# Patient Record
Sex: MALE | Race: WHITE | NOT HISPANIC OR LATINO | Employment: OTHER | URBAN - METROPOLITAN AREA
[De-identification: names, ages, dates, MRNs, and addresses within clinical notes are randomized per-mention and may not be internally consistent; named-entity substitution may affect disease eponyms.]

---

## 2017-05-05 ENCOUNTER — OFFICE VISIT (OUTPATIENT)
Dept: FAMILY MEDICINE CLINIC | Facility: CLINIC | Age: 57
End: 2017-05-05

## 2017-05-05 VITALS
RESPIRATION RATE: 24 BRPM | HEIGHT: 69 IN | TEMPERATURE: 97.8 F | HEART RATE: 80 BPM | WEIGHT: 155 LBS | BODY MASS INDEX: 22.96 KG/M2 | DIASTOLIC BLOOD PRESSURE: 96 MMHG | SYSTOLIC BLOOD PRESSURE: 150 MMHG

## 2017-05-05 DIAGNOSIS — G47.09 OTHER INSOMNIA: Primary | ICD-10-CM

## 2017-05-05 DIAGNOSIS — D69.6 THROMBOCYTOPENIA (HCC): ICD-10-CM

## 2017-05-05 PROCEDURE — 99213 OFFICE O/P EST LOW 20 MIN: CPT | Performed by: FAMILY MEDICINE

## 2017-05-05 RX ORDER — PREDNISONE 1 MG/1
5 TABLET ORAL DAILY
Qty: 1 TABLET | Refills: 0 | COMMUNITY
Start: 2017-05-05 | End: 2018-05-15 | Stop reason: SDUPTHER

## 2017-05-05 RX ORDER — ZOLPIDEM TARTRATE 12.5 MG/1
12.5 TABLET, FILM COATED, EXTENDED RELEASE ORAL NIGHTLY PRN
Qty: 30 TABLET | Refills: 5 | Status: SHIPPED | OUTPATIENT
Start: 2017-05-05 | End: 2017-07-21 | Stop reason: SDUPTHER

## 2017-07-21 DIAGNOSIS — G47.09 OTHER INSOMNIA: ICD-10-CM

## 2017-07-21 RX ORDER — ZOLPIDEM TARTRATE 12.5 MG/1
TABLET, FILM COATED, EXTENDED RELEASE ORAL
Qty: 30 TABLET | Refills: 0 | Status: SHIPPED | OUTPATIENT
Start: 2017-07-21 | End: 2017-11-15 | Stop reason: SDUPTHER

## 2017-08-14 ENCOUNTER — OFFICE VISIT (OUTPATIENT)
Dept: FAMILY MEDICINE CLINIC | Facility: CLINIC | Age: 57
End: 2017-08-14

## 2017-08-14 VITALS
HEART RATE: 78 BPM | RESPIRATION RATE: 18 BRPM | WEIGHT: 153 LBS | TEMPERATURE: 97.6 F | BODY MASS INDEX: 22.66 KG/M2 | DIASTOLIC BLOOD PRESSURE: 90 MMHG | SYSTOLIC BLOOD PRESSURE: 130 MMHG | HEIGHT: 69 IN

## 2017-08-14 DIAGNOSIS — H81.13 BPV (BENIGN POSITIONAL VERTIGO), BILATERAL: ICD-10-CM

## 2017-08-14 DIAGNOSIS — J06.9 ACUTE URI: Primary | ICD-10-CM

## 2017-08-14 PROCEDURE — 99213 OFFICE O/P EST LOW 20 MIN: CPT | Performed by: FAMILY MEDICINE

## 2017-08-14 RX ORDER — MECLIZINE HYDROCHLORIDE 25 MG/1
TABLET ORAL
Qty: 40 TABLET | Refills: 1 | Status: SHIPPED | OUTPATIENT
Start: 2017-08-14 | End: 2017-08-28 | Stop reason: SDUPTHER

## 2017-08-14 NOTE — PROGRESS NOTES
Subjective   Huang Almonte is a 56 y.o. male.     History of Present Illness     He has had a lot of congestion the last several days  He has had dizziness as well, worse with change in position  Some pressure in his ears  Blowing his nose more      Review of Systems   HENT: Positive for congestion.    Neurological: Positive for dizziness.       Objective   Physical Exam   Constitutional: He appears well-developed and well-nourished.   HENT:   Head: Normocephalic and atraumatic.   Right Ear: Hearing, tympanic membrane, external ear and ear canal normal.   Left Ear: Hearing, tympanic membrane, external ear and ear canal normal.   Nose: Nose normal.   Mouth/Throat: Uvula is midline, oropharynx is clear and moist and mucous membranes are normal.   Eyes: Conjunctivae and EOM are normal. Pupils are equal, round, and reactive to light.   No nystagmus   Neck: Normal range of motion.   Cardiovascular: Normal rate, regular rhythm and normal heart sounds.    Pulmonary/Chest: Effort normal and breath sounds normal.   Lymphadenopathy:     He has no cervical adenopathy.   Psychiatric: He has a normal mood and affect. His behavior is normal.   Nursing note and vitals reviewed.      Assessment/Plan   Huang was seen today for uri.    Diagnoses and all orders for this visit:    Acute URI    BPV (benign positional vertigo), bilateral  -     meclizine (ANTIVERT) 25 MG tablet; 1/2 to 1 to 2 pills as needed every 4 hours for dizziness    norel AD for congestion, call back INB  antivert and epley maneuvers for dizziness, call back INB

## 2017-08-26 DIAGNOSIS — H81.13 BPV (BENIGN POSITIONAL VERTIGO), BILATERAL: ICD-10-CM

## 2017-08-28 DIAGNOSIS — H81.13 BPV (BENIGN POSITIONAL VERTIGO), BILATERAL: ICD-10-CM

## 2017-08-28 RX ORDER — MECLIZINE HYDROCHLORIDE 25 MG/1
TABLET ORAL
Qty: 40 TABLET | Refills: 0 | OUTPATIENT
Start: 2017-08-28

## 2017-08-29 RX ORDER — MECLIZINE HYDROCHLORIDE 25 MG/1
TABLET ORAL
Qty: 40 TABLET | Refills: 0 | Status: SHIPPED | OUTPATIENT
Start: 2017-08-29 | End: 2017-09-05 | Stop reason: SDUPTHER

## 2017-09-05 DIAGNOSIS — H81.13 BPV (BENIGN POSITIONAL VERTIGO), BILATERAL: ICD-10-CM

## 2017-09-05 RX ORDER — MECLIZINE HYDROCHLORIDE 25 MG/1
TABLET ORAL
Qty: 40 TABLET | Refills: 0 | Status: SHIPPED | OUTPATIENT
Start: 2017-09-05 | End: 2018-05-15

## 2017-09-11 DIAGNOSIS — H81.13 BPV (BENIGN POSITIONAL VERTIGO), BILATERAL: ICD-10-CM

## 2017-09-11 RX ORDER — MECLIZINE HYDROCHLORIDE 25 MG/1
TABLET ORAL
Qty: 40 TABLET | Refills: 0 | OUTPATIENT
Start: 2017-09-11

## 2017-10-03 ENCOUNTER — TELEPHONE (OUTPATIENT)
Dept: FAMILY MEDICINE CLINIC | Facility: CLINIC | Age: 57
End: 2017-10-03

## 2017-10-03 NOTE — TELEPHONE ENCOUNTER
----- Message from Becky Benito sent at 10/3/2017  8:54 AM EDT -----  Contact: Maykel Pacheco is needing a refill on Ambien. Please call patient at [phone].

## 2017-11-09 ENCOUNTER — TELEPHONE (OUTPATIENT)
Dept: FAMILY MEDICINE CLINIC | Facility: CLINIC | Age: 57
End: 2017-11-09

## 2017-11-09 NOTE — TELEPHONE ENCOUNTER
----- Message from Dia Rodriguez sent at 11/9/2017  8:27 AM EST -----  Contact: DR LIZARRAGA MED REFILL  MED REFILL ON AMBIEN ER 12.5MG  [phone]

## 2017-11-15 ENCOUNTER — OFFICE VISIT (OUTPATIENT)
Dept: FAMILY MEDICINE CLINIC | Facility: CLINIC | Age: 57
End: 2017-11-15

## 2017-11-15 VITALS
HEIGHT: 69 IN | BODY MASS INDEX: 24.07 KG/M2 | SYSTOLIC BLOOD PRESSURE: 138 MMHG | WEIGHT: 162.5 LBS | TEMPERATURE: 97.5 F | RESPIRATION RATE: 20 BRPM | DIASTOLIC BLOOD PRESSURE: 90 MMHG | HEART RATE: 88 BPM

## 2017-11-15 DIAGNOSIS — Z12.5 SCREENING FOR PROSTATE CANCER: ICD-10-CM

## 2017-11-15 DIAGNOSIS — D69.6 THROMBOCYTOPENIA (HCC): ICD-10-CM

## 2017-11-15 DIAGNOSIS — R35.1 NOCTURIA: ICD-10-CM

## 2017-11-15 DIAGNOSIS — G47.09 OTHER INSOMNIA: Primary | ICD-10-CM

## 2017-11-15 LAB
BASOPHILS # BLD AUTO: 0.05 10*3/MM3 (ref 0–0.2)
BASOPHILS NFR BLD AUTO: 0.6 % (ref 0–1)
EOSINOPHIL # BLD AUTO: 0.14 10*3/MM3 (ref 0–0.3)
EOSINOPHIL NFR BLD AUTO: 1.6 % (ref 0–3)
ERYTHROCYTE [DISTWIDTH] IN BLOOD BY AUTOMATED COUNT: 13.2 % (ref 11.3–14.5)
HCT VFR BLD AUTO: 39.2 % (ref 38.9–50.9)
HGB BLD-MCNC: 13.4 G/DL (ref 13.1–17.5)
IMM GRANULOCYTES # BLD: 0.02 10*3/MM3 (ref 0–0.03)
IMM GRANULOCYTES NFR BLD: 0.2 % (ref 0–0.6)
LYMPHOCYTES # BLD AUTO: 2.71 10*3/MM3 (ref 0.6–4.8)
LYMPHOCYTES NFR BLD AUTO: 31.9 % (ref 24–44)
MCH RBC QN AUTO: 31.4 PG (ref 27–31)
MCHC RBC AUTO-ENTMCNC: 34.2 G/DL (ref 32–36)
MCV RBC AUTO: 91.8 FL (ref 80–99)
MONOCYTES # BLD AUTO: 0.55 10*3/MM3 (ref 0–1)
MONOCYTES NFR BLD AUTO: 6.5 % (ref 0–12)
NEUTROPHILS # BLD AUTO: 5.03 10*3/MM3 (ref 1.5–8.3)
NEUTROPHILS NFR BLD AUTO: 59.2 % (ref 41–71)
PLATELET # BLD AUTO: 57 10*3/MM3 (ref 150–450)
PSA SERPL-MCNC: 2.26 NG/ML (ref 0–4)
RBC # BLD AUTO: 4.27 10*6/MM3 (ref 4.2–5.76)
WBC # BLD AUTO: 8.5 10*3/MM3 (ref 3.5–10.8)

## 2017-11-15 PROCEDURE — 99213 OFFICE O/P EST LOW 20 MIN: CPT | Performed by: FAMILY MEDICINE

## 2017-11-15 RX ORDER — ZOLPIDEM TARTRATE 12.5 MG/1
12.5 TABLET, FILM COATED, EXTENDED RELEASE ORAL NIGHTLY PRN
Qty: 30 TABLET | Refills: 5 | Status: SHIPPED | OUTPATIENT
Start: 2017-11-15 | End: 2018-05-15 | Stop reason: SDUPTHER

## 2017-11-15 NOTE — PROGRESS NOTES
Subjective   Huang Almonte is a 57 y.o. male.     History of Present Illness     He has been taking his ambien without any issues  It does help him sleep and  no SE with this    He has been seeing Dr. Molina for his blood counts, platelet counts  Dr. Reveles has him on prednisone for this  No SE but wondering if he can come here.  I do not have very many notes from hematologist and so will request these for review    The following portions of the patient's history were reviewed and updated as appropriate: allergies, current medications, past family history, past medical history, past social history, past surgical history and problem list.    Review of Systems   Constitutional: Negative.        Objective   Physical Exam   Constitutional: He appears well-developed and well-nourished. No distress.   Cardiovascular: Normal rate, regular rhythm and normal heart sounds.    Pulmonary/Chest: Effort normal and breath sounds normal.   Psychiatric: He has a normal mood and affect. His behavior is normal.   Nursing note and vitals reviewed.      Assessment/Plan   Huang was seen today for insomnia.    Diagnoses and all orders for this visit:    Other insomnia  -     zolpidem CR (AMBIEN CR) 12.5 MG CR tablet; Take 1 tablet by mouth At Night As Needed for Sleep.    Thrombocytopenia  -     CBC & Differential    Screening for prostate cancer  -     PSA    Nocturia  -     PSA    ambien refilled, pt doing well, no change in medicine  Will recheck platelets and request hematology notes.  Also check PSA

## 2018-05-15 ENCOUNTER — OFFICE VISIT (OUTPATIENT)
Dept: FAMILY MEDICINE CLINIC | Facility: CLINIC | Age: 58
End: 2018-05-15

## 2018-05-15 VITALS
HEART RATE: 78 BPM | BODY MASS INDEX: 23.7 KG/M2 | RESPIRATION RATE: 18 BRPM | HEIGHT: 69 IN | DIASTOLIC BLOOD PRESSURE: 70 MMHG | WEIGHT: 160 LBS | TEMPERATURE: 98.5 F | SYSTOLIC BLOOD PRESSURE: 128 MMHG

## 2018-05-15 DIAGNOSIS — D69.6 THROMBOCYTOPENIA (HCC): ICD-10-CM

## 2018-05-15 DIAGNOSIS — E78.1 ESSENTIAL HYPERTRIGLYCERIDEMIA: ICD-10-CM

## 2018-05-15 DIAGNOSIS — G47.09 OTHER INSOMNIA: ICD-10-CM

## 2018-05-15 DIAGNOSIS — F41.1 GENERALIZED ANXIETY DISORDER: Primary | ICD-10-CM

## 2018-05-15 LAB
BASOPHILS # BLD AUTO: 0.1 10*3/MM3 (ref 0–0.2)
BASOPHILS NFR BLD AUTO: 1.2 % (ref 0–1)
CHOLEST SERPL-MCNC: 208 MG/DL (ref 0–200)
EOSINOPHIL # BLD AUTO: 0.16 10*3/MM3 (ref 0–0.3)
EOSINOPHIL NFR BLD AUTO: 1.9 % (ref 0–3)
ERYTHROCYTE [DISTWIDTH] IN BLOOD BY AUTOMATED COUNT: 13.8 % (ref 11.3–14.5)
HCT VFR BLD AUTO: 40.2 % (ref 38.9–50.9)
HDLC SERPL-MCNC: 50 MG/DL (ref 40–60)
HGB BLD-MCNC: 13.4 G/DL (ref 13.1–17.5)
IMM GRANULOCYTES # BLD: 0.02 10*3/MM3 (ref 0–0.03)
IMM GRANULOCYTES NFR BLD: 0.2 % (ref 0–0.6)
LDLC SERPL CALC-MCNC: 94 MG/DL (ref 0–100)
LYMPHOCYTES # BLD AUTO: 2.36 10*3/MM3 (ref 0.6–4.8)
LYMPHOCYTES NFR BLD AUTO: 28.4 % (ref 24–44)
MCH RBC QN AUTO: 30.9 PG (ref 27–31)
MCHC RBC AUTO-ENTMCNC: 33.3 G/DL (ref 32–36)
MCV RBC AUTO: 92.8 FL (ref 80–99)
MONOCYTES # BLD AUTO: 0.48 10*3/MM3 (ref 0–1)
MONOCYTES NFR BLD AUTO: 5.8 % (ref 0–12)
NEUTROPHILS # BLD AUTO: 5.18 10*3/MM3 (ref 1.5–8.3)
NEUTROPHILS NFR BLD AUTO: 62.5 % (ref 41–71)
PLATELET # BLD AUTO: 74 10*3/MM3 (ref 150–450)
RBC # BLD AUTO: 4.33 10*6/MM3 (ref 4.2–5.76)
TRIGL SERPL-MCNC: 322 MG/DL (ref 0–150)
VLDLC SERPL CALC-MCNC: 64.4 MG/DL
WBC # BLD AUTO: 8.3 10*3/MM3 (ref 3.5–10.8)

## 2018-05-15 PROCEDURE — 99214 OFFICE O/P EST MOD 30 MIN: CPT | Performed by: FAMILY MEDICINE

## 2018-05-15 RX ORDER — PREDNISONE 1 MG/1
5 TABLET ORAL DAILY
Qty: 1 TABLET | Refills: 0 | COMMUNITY
Start: 2018-05-15 | End: 2018-05-15 | Stop reason: SDUPTHER

## 2018-05-15 RX ORDER — ZOLPIDEM TARTRATE 12.5 MG/1
12.5 TABLET, FILM COATED, EXTENDED RELEASE ORAL NIGHTLY PRN
Qty: 30 TABLET | Refills: 5 | Status: SHIPPED | OUTPATIENT
Start: 2018-05-15 | End: 2018-10-16 | Stop reason: SDUPTHER

## 2018-05-15 RX ORDER — ESCITALOPRAM OXALATE 10 MG/1
10 TABLET ORAL DAILY
Qty: 30 TABLET | Refills: 2 | Status: SHIPPED | OUTPATIENT
Start: 2018-05-15 | End: 2018-08-15 | Stop reason: SDUPTHER

## 2018-05-15 RX ORDER — ESCITALOPRAM OXALATE 10 MG/1
10 TABLET ORAL DAILY
Qty: 30 TABLET | Refills: 2 | Status: SHIPPED | OUTPATIENT
Start: 2018-05-15 | End: 2018-05-15 | Stop reason: SDUPTHER

## 2018-05-15 RX ORDER — PREDNISONE 1 MG/1
5 TABLET ORAL DAILY
Qty: 30 TABLET | Refills: 5 | Status: SHIPPED | OUTPATIENT
Start: 2018-05-15 | End: 2019-11-08

## 2018-05-15 NOTE — PROGRESS NOTES
Subjective   Huang Almonte is a 57 y.o. male.     History of Present Illness     His sleep is doing ok  He does wake up sometimes form sleep  He is happy with the ambien CR but notes not working as well as it used to when he first started it    He is stressed all the time and this is affecting his sleep  He feels on edge often  He does not recall what he has been on in the past for his mood  Would be willing to try something    He continues to take prednisone for low platelets  He has been getting this from Dr. Duggan at [facility] but would like refill today  Will recheck labs  He is unsure what would happen if he stopped the medicine  No recent bruising nor bleeding problems    The following portions of the patient's history were reviewed and updated as appropriate: allergies, current medications, past family history, past medical history, past social history, past surgical history and problem list.    Review of Systems   Constitutional: Negative.    HENT: Negative.    Eyes: Negative.    Respiratory: Negative.    Cardiovascular: Negative.    Gastrointestinal: Negative.    Musculoskeletal: Negative.    Skin: Negative.    Neurological: Negative.    Hematological: Does not bruise/bleed easily.   Psychiatric/Behavioral: Positive for dysphoric mood. The patient is nervous/anxious.    All other systems reviewed and are negative.      Objective   Physical Exam   Constitutional: He is oriented to person, place, and time. He appears well-developed and well-nourished. No distress.   HENT:   Head: Normocephalic and atraumatic.   Right Ear: External ear normal.   Left Ear: External ear normal.   Nose: Nose normal.   Mouth/Throat: Uvula is midline and oropharynx is clear and moist.   Eyes: Conjunctivae and EOM are normal.   Neck: Normal range of motion. Neck supple. No thyromegaly present.   Cardiovascular: Normal rate, regular rhythm and normal heart sounds.    No murmur heard.  Pulmonary/Chest: Effort normal and breath sounds normal.  No respiratory distress.   Abdominal: Soft. Bowel sounds are normal. He exhibits no distension and no mass. There is no tenderness.   Lymphadenopathy:     He has no cervical adenopathy.   Neurological: He is alert and oriented to person, place, and time.   Skin: Skin is warm and dry.   Psychiatric: He has a normal mood and affect. His behavior is normal. Judgment and thought content normal.   Nursing note and vitals reviewed.      Assessment/Plan   Huang was seen today for follow-up.    Diagnoses and all orders for this visit:    Generalized anxiety disorder    Other insomnia  -     zolpidem CR (AMBIEN CR) 12.5 MG CR tablet; Take 1 tablet by mouth At Night As Needed for Sleep.  -     CBC & Differential    Thrombocytopenia  -     predniSONE (DELTASONE) 5 MG tablet; Take 1 tablet by mouth Daily.  -     CBC & Differential    Essential hypertriglyceridemia  -     Lipid Panel    Other orders  -     escitalopram (LEXAPRO) 10 MG tablet; Take 1 tablet by mouth Daily.    will continue ambien CR for sleep, no change in regimen  Ok pred for low platelets and recheck levels today.  Did recommend he follow up with hematologist as scheduled  Recheck lipids today  lexapro to be started for mood.  Pros/cons/SE discussed with pt but I do think this will boost and improve his mood.  F/u as needed

## 2018-08-15 DIAGNOSIS — F41.1 GENERALIZED ANXIETY DISORDER: ICD-10-CM

## 2018-08-15 RX ORDER — ESCITALOPRAM OXALATE 10 MG/1
TABLET ORAL
Qty: 30 TABLET | Refills: 1 | Status: SHIPPED | OUTPATIENT
Start: 2018-08-15 | End: 2018-10-15 | Stop reason: SDUPTHER

## 2018-10-15 DIAGNOSIS — F41.1 GENERALIZED ANXIETY DISORDER: ICD-10-CM

## 2018-10-15 RX ORDER — ESCITALOPRAM OXALATE 10 MG/1
TABLET ORAL
Qty: 30 TABLET | Refills: 0 | Status: SHIPPED | OUTPATIENT
Start: 2018-10-15 | End: 2018-10-16 | Stop reason: SDUPTHER

## 2018-10-16 ENCOUNTER — OFFICE VISIT (OUTPATIENT)
Dept: FAMILY MEDICINE CLINIC | Facility: CLINIC | Age: 58
End: 2018-10-16

## 2018-10-16 VITALS
SYSTOLIC BLOOD PRESSURE: 124 MMHG | TEMPERATURE: 97.3 F | DIASTOLIC BLOOD PRESSURE: 74 MMHG | WEIGHT: 161 LBS | HEIGHT: 69 IN | HEART RATE: 74 BPM | BODY MASS INDEX: 23.85 KG/M2 | RESPIRATION RATE: 16 BRPM

## 2018-10-16 DIAGNOSIS — D69.6 THROMBOCYTOPENIA (HCC): ICD-10-CM

## 2018-10-16 DIAGNOSIS — S51.812A LACERATION OF LEFT FOREARM, INITIAL ENCOUNTER: ICD-10-CM

## 2018-10-16 DIAGNOSIS — G47.09 OTHER INSOMNIA: ICD-10-CM

## 2018-10-16 DIAGNOSIS — F41.1 GENERALIZED ANXIETY DISORDER: Primary | ICD-10-CM

## 2018-10-16 DIAGNOSIS — R53.82 CHRONIC FATIGUE: ICD-10-CM

## 2018-10-16 DIAGNOSIS — Z12.5 SCREENING FOR PROSTATE CANCER: ICD-10-CM

## 2018-10-16 PROCEDURE — 90472 IMMUNIZATION ADMIN EACH ADD: CPT | Performed by: FAMILY MEDICINE

## 2018-10-16 PROCEDURE — G0008 ADMIN INFLUENZA VIRUS VAC: HCPCS | Performed by: FAMILY MEDICINE

## 2018-10-16 PROCEDURE — 90686 IIV4 VACC NO PRSV 0.5 ML IM: CPT | Performed by: FAMILY MEDICINE

## 2018-10-16 PROCEDURE — 90715 TDAP VACCINE 7 YRS/> IM: CPT | Performed by: FAMILY MEDICINE

## 2018-10-16 PROCEDURE — 99214 OFFICE O/P EST MOD 30 MIN: CPT | Performed by: FAMILY MEDICINE

## 2018-10-16 RX ORDER — ESCITALOPRAM OXALATE 10 MG/1
10 TABLET ORAL DAILY
Qty: 30 TABLET | Refills: 5 | Status: SHIPPED | OUTPATIENT
Start: 2018-10-16 | End: 2019-04-19 | Stop reason: DRUGHIGH

## 2018-10-16 RX ORDER — ZOLPIDEM TARTRATE 12.5 MG/1
12.5 TABLET, FILM COATED, EXTENDED RELEASE ORAL NIGHTLY PRN
Qty: 30 TABLET | Refills: 5 | Status: SHIPPED | OUTPATIENT
Start: 2018-10-16 | End: 2019-04-19 | Stop reason: SDUPTHER

## 2018-10-16 RX ORDER — ASPIRIN 81 MG/1
81 TABLET ORAL DAILY
Qty: 30 TABLET | Refills: 11 | Status: SHIPPED | OUTPATIENT
Start: 2018-10-16 | End: 2023-02-24

## 2018-10-16 NOTE — PROGRESS NOTES
Subjective   Huang Almonte is a 58 y.o. male.     History of Present Illness     Sleep continues to do well with the ambien  He has some issues the next day but not all the time  This is still better then the regular version    Mood has been doing well with the lexapro  It seems to help  He cannot really tell how it is doing at this time but no complaints  He is not interested in increasing the dose at this time    He is on prednisone for his platelets  He has not had labs drawn in quite a while and would like this checked    He continues to feel tired  Just poor energy all the time  Does feel rested in the AM but just fatigues easily    He has a cut on his left arm  He notes he has not had a tetanus in a long time and asks about this    The following portions of the patient's history were reviewed and updated as appropriate: allergies, current medications, past family history, past medical history, past social history, past surgical history and problem list.    Review of Systems   Constitutional: Positive for fatigue.   HENT: Negative.    Eyes: Negative.    Respiratory: Negative.    Cardiovascular: Negative.  Negative for chest pain.   Gastrointestinal: Negative.    Musculoskeletal: Positive for arthralgias and myalgias.   Skin: Negative.    Neurological: Negative.    Psychiatric/Behavioral: Positive for dysphoric mood. The patient is not nervous/anxious.    All other systems reviewed and are negative.      Objective   Physical Exam   Constitutional: He is oriented to person, place, and time. He appears well-developed and well-nourished. No distress.   HENT:   Head: Normocephalic and atraumatic.   Right Ear: Tympanic membrane, external ear and ear canal normal.   Left Ear: Tympanic membrane, external ear and ear canal normal.   Nose: Nose normal.   Mouth/Throat: Uvula is midline and oropharynx is clear and moist.   Eyes: Conjunctivae and EOM are normal.   Neck: Normal range of motion. Neck supple. No thyromegaly  present.   Cardiovascular: Normal rate, regular rhythm and normal heart sounds.    No murmur heard.  Pulmonary/Chest: Effort normal and breath sounds normal. No respiratory distress.   Abdominal: Soft. Bowel sounds are normal. He exhibits no distension and no mass. There is no tenderness.   Lymphadenopathy:     He has no cervical adenopathy.   Neurological: He is alert and oriented to person, place, and time.   Skin: Skin is warm and dry.        Psychiatric: He has a normal mood and affect. His behavior is normal. Judgment and thought content normal.   Nursing note and vitals reviewed.      Assessment/Plan   Huang was seen today for follow-up.    Diagnoses and all orders for this visit:    Generalized anxiety disorder  -     escitalopram (LEXAPRO) 10 MG tablet; Take 1 tablet by mouth Daily.    Other insomnia  -     zolpidem CR (AMBIEN CR) 12.5 MG CR tablet; Take 1 tablet by mouth At Night As Needed for Sleep.    Thrombocytopenia (CMS/HCC)  -     CBC & Differential    Chronic fatigue  -     CBC & Differential  -     Comprehensive Metabolic Panel  -     TSH  -     Testosterone    Screening for prostate cancer  -     PSA Screen    Laceration of left forearm, initial encounter  -     Tdap Vaccine Greater Than or Equal To 8yo IM    Other orders  -     aspirin 81 MG EC tablet; Take 1 tablet by mouth Daily.  -     Flu Vaccine Quad PF 3YR+    his mood has been doing ok, will continue lexapro and consider increase in dose in the future  Continue ambien cr for sleep  Will check labs for fatigue and f/u pending results  Tdap given as he had a recent laceration to his arm that is healing well  Flu shot given today as well

## 2018-10-17 PROBLEM — D69.3 CHRONIC ITP (IDIOPATHIC THROMBOCYTOPENIA): Status: ACTIVE | Noted: 2018-10-17

## 2018-10-17 LAB
ALBUMIN SERPL-MCNC: 4.53 G/DL (ref 3.2–4.8)
ALBUMIN/GLOB SERPL: 2.2 G/DL (ref 1.5–2.5)
ALP SERPL-CCNC: 71 U/L (ref 25–100)
ALT SERPL-CCNC: 17 U/L (ref 7–40)
AST SERPL-CCNC: 21 U/L (ref 0–33)
BASOPHILS # BLD AUTO: 0.09 10*3/MM3 (ref 0–0.2)
BASOPHILS NFR BLD AUTO: 1.6 % (ref 0–1)
BILIRUB SERPL-MCNC: 0.4 MG/DL (ref 0.3–1.2)
BUN SERPL-MCNC: 19 MG/DL (ref 9–23)
BUN/CREAT SERPL: 18.8 (ref 7–25)
CALCIUM SERPL-MCNC: 9.4 MG/DL (ref 8.7–10.4)
CHLORIDE SERPL-SCNC: 104 MMOL/L (ref 99–109)
CO2 SERPL-SCNC: 30 MMOL/L (ref 20–31)
CREAT SERPL-MCNC: 1.01 MG/DL (ref 0.6–1.3)
EOSINOPHIL # BLD AUTO: 0.04 10*3/MM3 (ref 0–0.3)
EOSINOPHIL NFR BLD AUTO: 0.7 % (ref 0–3)
ERYTHROCYTE [DISTWIDTH] IN BLOOD BY AUTOMATED COUNT: 13.5 % (ref 11.3–14.5)
GLOBULIN SER CALC-MCNC: 2.1 GM/DL
GLUCOSE SERPL-MCNC: 117 MG/DL (ref 70–100)
HCT VFR BLD AUTO: 40.6 % (ref 38.9–50.9)
HGB BLD-MCNC: 13.6 G/DL (ref 13.1–17.5)
IMM GRANULOCYTES # BLD: 0.01 10*3/MM3 (ref 0–0.03)
IMM GRANULOCYTES NFR BLD: 0.2 % (ref 0–0.6)
LYMPHOCYTES # BLD AUTO: 1.27 10*3/MM3 (ref 0.6–4.8)
LYMPHOCYTES NFR BLD AUTO: 22.4 % (ref 24–44)
MCH RBC QN AUTO: 30.6 PG (ref 27–31)
MCHC RBC AUTO-ENTMCNC: 33.5 G/DL (ref 32–36)
MCV RBC AUTO: 91.4 FL (ref 80–99)
MONOCYTES # BLD AUTO: 0.24 10*3/MM3 (ref 0–1)
MONOCYTES NFR BLD AUTO: 4.2 % (ref 0–12)
NEUTROPHILS # BLD AUTO: 4.03 10*3/MM3 (ref 1.5–8.3)
NEUTROPHILS NFR BLD AUTO: 71.1 % (ref 41–71)
PLATELET # BLD AUTO: 50 10*3/MM3 (ref 150–450)
POTASSIUM SERPL-SCNC: 4.6 MMOL/L (ref 3.5–5.5)
PROT SERPL-MCNC: 6.6 G/DL (ref 5.7–8.2)
PSA SERPL-MCNC: 2.56 NG/ML (ref 0–4)
RBC # BLD AUTO: 4.44 10*6/MM3 (ref 4.2–5.76)
SODIUM SERPL-SCNC: 136 MMOL/L (ref 132–146)
TESTOST SERPL-MCNC: 255 NG/DL (ref 264–916)
TSH SERPL DL<=0.005 MIU/L-ACNC: 0.77 MIU/ML (ref 0.35–5.35)
WBC # BLD AUTO: 5.67 10*3/MM3 (ref 3.5–10.8)

## 2019-04-19 ENCOUNTER — OFFICE VISIT (OUTPATIENT)
Dept: FAMILY MEDICINE CLINIC | Facility: CLINIC | Age: 59
End: 2019-04-19

## 2019-04-19 VITALS
TEMPERATURE: 98 F | WEIGHT: 160 LBS | HEIGHT: 69 IN | DIASTOLIC BLOOD PRESSURE: 72 MMHG | HEART RATE: 76 BPM | BODY MASS INDEX: 23.7 KG/M2 | SYSTOLIC BLOOD PRESSURE: 130 MMHG | RESPIRATION RATE: 18 BRPM

## 2019-04-19 DIAGNOSIS — D69.6 THROMBOCYTOPENIA (HCC): ICD-10-CM

## 2019-04-19 DIAGNOSIS — G47.30 OBSERVED SLEEP APNEA: ICD-10-CM

## 2019-04-19 DIAGNOSIS — F41.1 GENERALIZED ANXIETY DISORDER: Primary | ICD-10-CM

## 2019-04-19 DIAGNOSIS — Z87.891 PERSONAL HISTORY OF TOBACCO USE, PRESENTING HAZARDS TO HEALTH: ICD-10-CM

## 2019-04-19 DIAGNOSIS — R53.82 CHRONIC FATIGUE: ICD-10-CM

## 2019-04-19 DIAGNOSIS — E29.1 HYPOGONADISM IN MALE: ICD-10-CM

## 2019-04-19 DIAGNOSIS — G47.09 OTHER INSOMNIA: ICD-10-CM

## 2019-04-19 PROCEDURE — 99214 OFFICE O/P EST MOD 30 MIN: CPT | Performed by: FAMILY MEDICINE

## 2019-04-19 RX ORDER — HYDROCODONE BITARTRATE AND ACETAMINOPHEN 10; 325 MG/1; MG/1
TABLET ORAL
COMMUNITY
Start: 2019-04-10 | End: 2021-03-16

## 2019-04-19 RX ORDER — ZOLPIDEM TARTRATE 12.5 MG/1
12.5 TABLET, FILM COATED, EXTENDED RELEASE ORAL NIGHTLY PRN
Qty: 30 TABLET | Refills: 5 | Status: SHIPPED | OUTPATIENT
Start: 2019-04-19 | End: 2019-11-08 | Stop reason: SDUPTHER

## 2019-04-19 RX ORDER — DESVENLAFAXINE SUCCINATE 50 MG/1
50 TABLET, EXTENDED RELEASE ORAL DAILY
Qty: 30 TABLET | Refills: 5 | Status: SHIPPED | OUTPATIENT
Start: 2019-04-19 | End: 2019-11-01 | Stop reason: SDUPTHER

## 2019-04-19 NOTE — PROGRESS NOTES
Subjective   Huang Almonte is a 58 y.o. male.     History of Present Illness     Pt's relative notices that his mood is better overall  They feel he is less anxious but pt not sure    Sleep is doing well with the ambien  No SE that he is aware of but he is still tired the next day and actually is tired all the time    He is still tired, poor energy  Wore out in the AM when he gets out of bed as well  Just always tired  He never had a sleep study  He has been told he stops breathing    His T was low in the past but he never came back for second level  This could be casing his fatigue    He has not been taking his pred every day  He is on this for his platelets  If his platelets are low he will resume this medicine      The following portions of the patient's history were reviewed and updated as appropriate: allergies, current medications, past family history, past medical history, past social history, past surgical history and problem list.    Review of Systems   Constitutional: Positive for fatigue.   HENT: Negative.    Eyes: Negative.    Respiratory: Negative.  Negative for shortness of breath.    Cardiovascular: Negative.  Negative for chest pain.   Gastrointestinal: Negative.  Negative for constipation and diarrhea.   Musculoskeletal: Negative.    Skin: Negative.    Neurological: Negative.    Hematological: Does not bruise/bleed easily.   Psychiatric/Behavioral: Negative.  Negative for sleep disturbance.   All other systems reviewed and are negative.      Objective   Physical Exam   Constitutional: He is oriented to person, place, and time. He appears well-developed and well-nourished. No distress.   Neck: Normal range of motion. Neck supple. No thyromegaly present.   Cardiovascular: Normal rate, regular rhythm and normal heart sounds.   Pulmonary/Chest: Effort normal and breath sounds normal.   Abdominal: Soft. Bowel sounds are normal. He exhibits no distension. There is no tenderness.   Lymphadenopathy:     He has  no cervical adenopathy.   Neurological: He is alert and oriented to person, place, and time.   Psychiatric: He has a normal mood and affect. His behavior is normal. Judgment and thought content normal.   Nursing note and vitals reviewed.      Assessment/Plan   Huang was seen today for anxiety.    Diagnoses and all orders for this visit:    Generalized anxiety disorder  -     desvenlafaxine (PRISTIQ) 50 MG 24 hr tablet; Take 1 tablet by mouth Daily.    Other insomnia  -     zolpidem CR (AMBIEN CR) 12.5 MG CR tablet; Take 1 tablet by mouth At Night As Needed for Sleep.    Thrombocytopenia (CMS/HCC)  -     CBC & Differential  -     Comprehensive Metabolic Panel    Chronic fatigue  -     Testosterone  -     Comprehensive Metabolic Panel  -     Home Sleep Study; Future    Hypogonadism in male  -     Testosterone  -     Comprehensive Metabolic Panel    Observed sleep apnea  -     Home Sleep Study; Future    Personal history of tobacco use, presenting hazards to health  -     CT Chest Low Dose Wo; Future    mood is better on lexapro but we will try pristiq to see if this helps his energy more.  Continue ambien for asleep, this is working well.  Recheck CBC  Fatigue is multifactorial but he has been observed having sleep apnea, will check  Sleep study and recheck his testosterone levels.  Low dose CT for 40 pack year history of smoking

## 2019-04-20 DIAGNOSIS — D69.6 THROMBOCYTOPENIA (HCC): ICD-10-CM

## 2019-04-20 DIAGNOSIS — E29.1 HYPOGONADISM IN MALE: Primary | ICD-10-CM

## 2019-04-20 LAB
ALBUMIN SERPL-MCNC: 5.1 G/DL (ref 3.5–5.2)
ALBUMIN/GLOB SERPL: 2.2 G/DL
ALP SERPL-CCNC: 86 U/L (ref 39–117)
ALT SERPL-CCNC: 19 U/L (ref 1–41)
AST SERPL-CCNC: 22 U/L (ref 1–40)
BASOPHILS # BLD AUTO: (no result) 10*3/UL
BASOPHILS # BLD MANUAL: 0.15 10*3/MM3 (ref 0–0.2)
BASOPHILS NFR BLD MANUAL: 2 % (ref 0–1.5)
BILIRUB SERPL-MCNC: 0.4 MG/DL (ref 0.2–1.2)
BUN SERPL-MCNC: 13 MG/DL (ref 6–20)
BUN/CREAT SERPL: 11.4 (ref 7–25)
CALCIUM SERPL-MCNC: 9.6 MG/DL (ref 8.6–10.5)
CHLORIDE SERPL-SCNC: 100 MMOL/L (ref 98–107)
CO2 SERPL-SCNC: 24.3 MMOL/L (ref 22–29)
CREAT SERPL-MCNC: 1.14 MG/DL (ref 0.76–1.27)
DIFFERENTIAL COMMENT: ABNORMAL
EOSINOPHIL # BLD AUTO: (no result) 10*3/UL
EOSINOPHIL # BLD MANUAL: 0.15 10*3/MM3 (ref 0–0.4)
EOSINOPHIL NFR BLD AUTO: (no result) %
EOSINOPHIL NFR BLD MANUAL: 2 % (ref 0.3–6.2)
ERYTHROCYTE [DISTWIDTH] IN BLOOD BY AUTOMATED COUNT: 13.3 % (ref 12.3–15.4)
GLOBULIN SER CALC-MCNC: 2.3 GM/DL
GLUCOSE SERPL-MCNC: 90 MG/DL (ref 65–99)
HCT VFR BLD AUTO: 40.1 % (ref 37.5–51)
HGB BLD-MCNC: 13.1 G/DL (ref 13–17.7)
LYMPHOCYTES # BLD AUTO: (no result) 10*3/UL
LYMPHOCYTES # BLD MANUAL: 2.54 10*3/MM3 (ref 0.7–3.1)
LYMPHOCYTES NFR BLD AUTO: (no result) %
LYMPHOCYTES NFR BLD MANUAL: 34.7 % (ref 19.6–45.3)
MCH RBC QN AUTO: 29.8 PG (ref 26.6–33)
MCHC RBC AUTO-ENTMCNC: 32.7 G/DL (ref 31.5–35.7)
MCV RBC AUTO: 91.1 FL (ref 79–97)
MONOCYTES # BLD MANUAL: 0.22 10*3/MM3 (ref 0.1–0.9)
MONOCYTES NFR BLD AUTO: (no result) %
MONOCYTES NFR BLD MANUAL: 3 % (ref 5–12)
NEUTROPHILS # BLD MANUAL: 4.21 10*3/MM3 (ref 1.7–7)
NEUTROPHILS NFR BLD AUTO: (no result) %
NEUTROPHILS NFR BLD MANUAL: 57.4 % (ref 42.7–76)
PLATELET # BLD AUTO: 35 10*3/MM3 (ref 140–450)
PLATELET BLD QL SMEAR: ABNORMAL
POTASSIUM SERPL-SCNC: 4 MMOL/L (ref 3.5–5.2)
PROT SERPL-MCNC: 7.4 G/DL (ref 6–8.5)
RBC # BLD AUTO: 4.4 10*6/MM3 (ref 4.14–5.8)
RBC MORPH BLD: ABNORMAL
SODIUM SERPL-SCNC: 140 MMOL/L (ref 136–145)
TESTOST SERPL-MCNC: 188 NG/DL (ref 264–916)
WBC # BLD AUTO: 7.33 10*3/MM3 (ref 3.4–10.8)

## 2019-04-20 RX ORDER — TESTOSTERONE 40.5 MG/2.5G
GEL TOPICAL
Qty: 60 G | Refills: 5 | Status: SHIPPED | OUTPATIENT
Start: 2019-04-20 | End: 2019-11-08

## 2019-04-24 ENCOUNTER — OFFICE VISIT (OUTPATIENT)
Dept: FAMILY MEDICINE CLINIC | Facility: CLINIC | Age: 59
End: 2019-04-24

## 2019-04-24 VITALS
BODY MASS INDEX: 23.62 KG/M2 | TEMPERATURE: 98.3 F | WEIGHT: 159.5 LBS | OXYGEN SATURATION: 98 % | RESPIRATION RATE: 18 BRPM | HEART RATE: 83 BPM | DIASTOLIC BLOOD PRESSURE: 86 MMHG | HEIGHT: 69 IN | SYSTOLIC BLOOD PRESSURE: 142 MMHG

## 2019-04-24 DIAGNOSIS — F41.1 GENERALIZED ANXIETY DISORDER: ICD-10-CM

## 2019-04-24 DIAGNOSIS — Z00.00 ENCOUNTER FOR WELL ADULT EXAM WITHOUT ABNORMAL FINDINGS: ICD-10-CM

## 2019-04-24 DIAGNOSIS — D69.3 CHRONIC ITP (IDIOPATHIC THROMBOCYTOPENIA) (HCC): ICD-10-CM

## 2019-04-24 DIAGNOSIS — E78.1 ESSENTIAL HYPERTRIGLYCERIDEMIA: ICD-10-CM

## 2019-04-24 DIAGNOSIS — Z00.00 MEDICARE ANNUAL WELLNESS VISIT, INITIAL: Primary | ICD-10-CM

## 2019-04-24 DIAGNOSIS — E29.1 HYPOGONADISM IN MALE: ICD-10-CM

## 2019-04-24 PROCEDURE — 99386 PREV VISIT NEW AGE 40-64: CPT | Performed by: PHYSICIAN ASSISTANT

## 2019-04-24 PROCEDURE — G0438 PPPS, INITIAL VISIT: HCPCS | Performed by: PHYSICIAN ASSISTANT

## 2019-04-24 NOTE — PROGRESS NOTES
QUICK REFERENCE INFORMATION:  The ABCs of the Annual Wellness Visit    Initial Medicare Wellness Visit    HEALTH RISK ASSESSMENT    : 1960    Recent Hospitalizations:  No hospitalization(s) within the last year..      Current Medical Providers:  Patient Care Team:  Jeremías Brar MD as PCP - General  Dr. Duggan- hematology (ITP)   Pain management     UTD on eye exam   Upper partial dentures . Not UTD on dental exam       Smoking Status:  Social History     Tobacco Use   Smoking Status Former Smoker   • Packs/day: 1.00   • Years: 40.00   • Pack years: 40.00   • Types: Cigarettes   • Last attempt to quit: 2019   • Years since quittin.2   Smokeless Tobacco Never Used       Alcohol Consumption:  Social History     Substance and Sexual Activity   Alcohol Use Yes    Comment: trying to quit       Depression Screen:   PHQ-2/PHQ-9 Depression Screening 2019   Little interest or pleasure in doing things 1   Feeling down, depressed, or hopeless 1   Trouble falling or staying asleep, or sleeping too much 1   Feeling tired or having little energy 1   Poor appetite or overeating 0   Feeling bad about yourself - or that you are a failure or have let yourself or your family down 0   Trouble concentrating on things, such as reading the newspaper or watching television 0   Moving or speaking so slowly that other people could have noticed. Or the opposite - being so fidgety or restless that you have been moving around a lot more than usual 0   Thoughts that you would be better off dead, or of hurting yourself in some way 0   Total Score 4   If you checked off any problems, how difficult have these problems made it for you to do your work, take care of things at home, or get along with other people? Somewhat difficult       Health Habits and Functional and Cognitive Screening:  Functional & Cognitive Status 2019   Do you have difficulty preparing food and eating? No   Do you have difficulty bathing yourself,  getting dressed or grooming yourself? No   Do you have difficulty using the toilet? No   Do you have difficulty moving around from place to place? No   Do you have trouble with steps or getting out of a bed or a chair? No   In the past year have you fallen or experienced a near fall? No   Current Diet Well Balanced Diet   Dental Exam Not up to date   Eye Exam Not up to date   Exercise (times per week) 3 times per week   Current Exercise Activities Include Walking   Do you need help using the phone?  No   Are you deaf or do you have serious difficulty hearing?  No   Do you need help with transportation? No   Do you need help shopping? No   Do you need help preparing meals?  No   Do you need help with housework?  No   Do you need help with laundry? No   Do you need help taking your medications? No   Do you need help managing money? No   Do you ever drive or ride in a car without wearing a seat belt? No           Does the patient have evidence of cognitive impairment? No    Asiprin use counseling: Taking ASA appropriately as indicated      Recent Lab Results:    Lab Results   Component Value Date    GLU 90 04/19/2019        Lab Results   Component Value Date    TRIG 322 (H) 05/15/2018    HDL 50 05/15/2018    VLDL 64.4 05/15/2018           Age-appropriate Screening Schedule:  Refer to the list below for future screening recommendations based on patient's age, sex and/or medical conditions. Orders for these recommended tests are listed in the plan section. The patient has been provided with a written plan.    Health Maintenance   Topic Date Due   • ZOSTER VACCINE (1 of 2) 04/24/2020 (Originally 10/14/2009)   • LIPID PANEL  05/15/2019   • INFLUENZA VACCINE  08/01/2019   • COLONOSCOPY  10/11/2020   • TDAP/TD VACCINES (2 - Td) 10/16/2028        Subjective   History of Present Illness    Huang Almonte is a 58 y.o. male who presents for an Annual Wellness Visit. Hx of ITP (sees hematology), insomnia, chronic back pain, low  testosterone, neuropathy. Managed by PCP, hematology and pain management. Labs and visits are UTD. No new concerns     The following portions of the patient's history were reviewed and updated as appropriate: allergies, current medications, past family history, past medical history, past social history, past surgical history and problem list.    Outpatient Medications Prior to Visit   Medication Sig Dispense Refill   • aspirin 81 MG EC tablet Take 1 tablet by mouth Daily. 30 tablet 11   • desvenlafaxine (PRISTIQ) 50 MG 24 hr tablet Take 1 tablet by mouth Daily. 30 tablet 5   • gabapentin (NEURONTIN) 400 MG capsule Gabapentin 400 MG Oral Capsule; Patient Sig: Gabapentin 400 MG Oral Capsule ; 90; 0; 07-May-2015; Active     • HYDROcodone-acetaminophen (NORCO)  MG per tablet      • predniSONE (DELTASONE) 5 MG tablet Take 1 tablet by mouth Daily. 30 tablet 5   • Testosterone 40.5 MG/2.5GM (1.62%) gel 2 pump actuations applied daily as directed 60 g 5   • zolpidem CR (AMBIEN CR) 12.5 MG CR tablet Take 1 tablet by mouth At Night As Needed for Sleep. 30 tablet 5     No facility-administered medications prior to visit.        Patient Active Problem List   Diagnosis   • Back pain   • Generalized anxiety disorder   • Essential hypertriglyceridemia   • Current every day smoker   • Insomnia   • Thrombocytopenia (CMS/HCC)   • Cataract   • Chronic ITP (idiopathic thrombocytopenia) (CMS/HCC)   • Hypogonadism in male       Advance Care Planning:  Patient does not have an advance directive - information provided to the patient today    Identification of Risk Factors:  Risk factors include: chronic pain.    Review of Systems   Constitutional: Negative.  Negative for chills, diaphoresis, fatigue and fever.   HENT: Negative.  Negative for congestion, ear discharge, ear pain, hearing loss, nosebleeds, postnasal drip, sinus pressure, sneezing and sore throat.    Eyes: Negative.    Respiratory: Negative.  Negative for cough, chest  tightness, shortness of breath and wheezing.    Cardiovascular: Negative.  Negative for chest pain, palpitations and leg swelling.   Gastrointestinal: Negative for abdominal distention, abdominal pain, blood in stool, constipation, diarrhea, nausea and vomiting.   Genitourinary: Negative.  Negative for difficulty urinating, dysuria, flank pain, frequency, hematuria and urgency.   Musculoskeletal: Positive for arthralgias and back pain. Negative for gait problem, joint swelling, myalgias, neck pain and neck stiffness.   Skin: Negative.  Negative for color change, pallor, rash and wound.   Neurological: Negative for dizziness, syncope, weakness, light-headedness, numbness and headaches.       Compared to one year ago, the patient feels his physical health is the same.  Compared to one year ago, the patient feels his mental health is the same.    Objective     Physical Exam   Constitutional: He is oriented to person, place, and time. He appears well-developed and well-nourished.   HENT:   Head: Normocephalic and atraumatic.   Right Ear: External ear normal.   Left Ear: External ear normal.   Nose: Nose normal.   Mouth/Throat: Oropharynx is clear and moist. No oropharyngeal exudate.   Eyes: Conjunctivae are normal.   Neck: Normal range of motion. Neck supple. No tracheal deviation present. No thyromegaly present.   Cardiovascular: Normal rate, regular rhythm, normal heart sounds and intact distal pulses. Exam reveals no gallop and no friction rub.   No murmur heard.  Pulmonary/Chest: Effort normal and breath sounds normal. No respiratory distress. He has no wheezes. He has no rales. He exhibits no tenderness.   Lymphadenopathy:     He has no cervical adenopathy.   Neurological: He is alert and oriented to person, place, and time.   Skin: Skin is warm and dry.   Psychiatric: He has a normal mood and affect. His behavior is normal. Judgment and thought content normal.   Nursing note and vitals reviewed.      Vitals:     "04/24/19 1138   BP: 142/86   Pulse: 83   Resp: 18   Temp: 98.3 °F (36.8 °C)   SpO2: 98%   Weight: 72.3 kg (159 lb 8 oz)   Height: 175.3 cm (69\")       Patient's Body mass index is 23.55 kg/m². BMI is within normal parameters. No follow-up required..      Assessment/Plan   Patient Self-Management and Personalized Health Advice  The patient has been provided with information about: diet and exercise and preventive services including:   · Advance directive, Exercise counseling provided, Fall Risk assessment done, Nutrition counseling provided, Urinary Incontinence assessment done.    Visit Diagnoses:    ICD-10-CM ICD-9-CM   1. Medicare annual wellness visit, initial Z00.00 V70.0   2. Chronic ITP (idiopathic thrombocytopenia) (CMS/[facility]) D69.3 287.31   3. Essential hypertriglyceridemia E78.1 272.1   4. Hypogonadism in male E29.1 257.2   5. Generalized anxiety disorder F41.1 300.02   6. Encounter for well adult exam without abnormal findings Z00.00 V70.0       No orders of the defined types were placed in this encounter.      Outpatient Encounter Medications as of 4/24/2019   Medication Sig Dispense Refill   • aspirin 81 MG EC tablet Take 1 tablet by mouth Daily. 30 tablet 11   • desvenlafaxine (PRISTIQ) 50 MG 24 hr tablet Take 1 tablet by mouth Daily. 30 tablet 5   • gabapentin (NEURONTIN) 400 MG capsule Gabapentin 400 MG Oral Capsule; Patient Sig: Gabapentin 400 MG Oral Capsule ; 90; 0; 07-May-2015; Active     • HYDROcodone-acetaminophen (NORCO)  MG per tablet      • predniSONE (DELTASONE) 5 MG tablet Take 1 tablet by mouth Daily. 30 tablet 5   • Testosterone 40.5 MG/2.5GM (1.62%) gel 2 pump actuations applied daily as directed 60 g 5   • zolpidem CR (AMBIEN CR) 12.5 MG CR tablet Take 1 tablet by mouth At Night As Needed for Sleep. 30 tablet 5     No facility-administered encounter medications on file as of 4/24/2019.        Reviewed use of high risk medication in the elderly: yes  Reviewed for potential of harmful " drug interactions in the elderly: yes    Follow Up:  1 year for medicare wellness     An After Visit Summary and PPPS with all of these plans were given to the patient.

## 2019-06-09 DIAGNOSIS — D69.6 THROMBOCYTOPENIA (HCC): ICD-10-CM

## 2019-06-11 ENCOUNTER — TELEPHONE (OUTPATIENT)
Dept: FAMILY MEDICINE CLINIC | Facility: CLINIC | Age: 59
End: 2019-06-11

## 2019-06-11 NOTE — TELEPHONE ENCOUNTER
----- Message from Missy Gaspar Rep sent at 6/11/2019 10:06 AM EDT -----  Contact: PT; ELHAM  REFILL      predniSONE 5 MG TAKE ONE TABLET BY MOUTH DAILY       NURYS CONROY    PT: [phone]

## 2019-06-12 ENCOUNTER — TELEPHONE (OUTPATIENT)
Dept: FAMILY MEDICINE CLINIC | Facility: CLINIC | Age: 59
End: 2019-06-12

## 2019-06-12 NOTE — TELEPHONE ENCOUNTER
----- Message from Dia Rodriguez sent at 6/12/2019  8:57 AM EDT -----  Contact: DR LIZARRAGA   PATIENT SAYS HE CAN NOT AFFORD TO GO TO THE HEMATOLOGIST THAT IS WHY HE IS ASKING FOR YOU TO REFILL THE PREDNISONE.  PATIENT IS AWARE THAT YOU ARE NOT BACK IN THE OFFICE TILL Monday.

## 2019-06-18 NOTE — TELEPHONE ENCOUNTER
He will need to follow up with hematology to continue this medicine long term.  ITP is simply not something I treat and he will need to see Dr. Duggan at least once a year for refills of this medicine

## 2019-06-19 RX ORDER — PREDNISONE 1 MG/1
TABLET ORAL
Qty: 30 TABLET | Refills: 4 | OUTPATIENT
Start: 2019-06-19

## 2019-10-15 DIAGNOSIS — F41.1 GENERALIZED ANXIETY DISORDER: ICD-10-CM

## 2019-10-15 RX ORDER — ESCITALOPRAM OXALATE 10 MG/1
TABLET ORAL
Qty: 30 TABLET | Refills: 4 | OUTPATIENT
Start: 2019-10-15

## 2019-11-01 DIAGNOSIS — F41.1 GENERALIZED ANXIETY DISORDER: ICD-10-CM

## 2019-11-01 RX ORDER — DESVENLAFAXINE SUCCINATE 50 MG/1
50 TABLET, EXTENDED RELEASE ORAL DAILY
Qty: 30 TABLET | Refills: 0 | Status: SHIPPED | OUTPATIENT
Start: 2019-11-01 | End: 2019-11-08 | Stop reason: SDUPTHER

## 2019-11-08 ENCOUNTER — OFFICE VISIT (OUTPATIENT)
Dept: FAMILY MEDICINE CLINIC | Facility: CLINIC | Age: 59
End: 2019-11-08

## 2019-11-08 VITALS
HEIGHT: 69 IN | SYSTOLIC BLOOD PRESSURE: 130 MMHG | WEIGHT: 162.5 LBS | RESPIRATION RATE: 18 BRPM | HEART RATE: 80 BPM | DIASTOLIC BLOOD PRESSURE: 84 MMHG | BODY MASS INDEX: 24.07 KG/M2 | TEMPERATURE: 98 F

## 2019-11-08 DIAGNOSIS — G47.09 OTHER INSOMNIA: ICD-10-CM

## 2019-11-08 DIAGNOSIS — D69.3 CHRONIC ITP (IDIOPATHIC THROMBOCYTOPENIA) (HCC): Primary | ICD-10-CM

## 2019-11-08 DIAGNOSIS — Z12.5 SCREENING FOR PROSTATE CANCER: ICD-10-CM

## 2019-11-08 DIAGNOSIS — F41.1 GENERALIZED ANXIETY DISORDER: ICD-10-CM

## 2019-11-08 DIAGNOSIS — E78.1 ESSENTIAL HYPERTRIGLYCERIDEMIA: ICD-10-CM

## 2019-11-08 PROCEDURE — G0008 ADMIN INFLUENZA VIRUS VAC: HCPCS | Performed by: FAMILY MEDICINE

## 2019-11-08 PROCEDURE — 90674 CCIIV4 VAC NO PRSV 0.5 ML IM: CPT | Performed by: FAMILY MEDICINE

## 2019-11-08 PROCEDURE — 99214 OFFICE O/P EST MOD 30 MIN: CPT | Performed by: FAMILY MEDICINE

## 2019-11-08 RX ORDER — ZOLPIDEM TARTRATE 12.5 MG/1
12.5 TABLET, FILM COATED, EXTENDED RELEASE ORAL NIGHTLY PRN
Qty: 30 TABLET | Refills: 5 | Status: SHIPPED | OUTPATIENT
Start: 2019-11-08 | End: 2020-05-01 | Stop reason: SDUPTHER

## 2019-11-08 RX ORDER — DESVENLAFAXINE SUCCINATE 50 MG/1
50 TABLET, EXTENDED RELEASE ORAL DAILY
Qty: 30 TABLET | Refills: 5 | Status: SHIPPED | OUTPATIENT
Start: 2019-11-08 | End: 2020-07-14 | Stop reason: SDUPTHER

## 2019-11-08 NOTE — PROGRESS NOTES
Subjective   Huang Almonte is a 59 y.o. male.     History of Present Illness     Mood is doing ok  No SE with the pristiq  Energy has been doing ok but not a major difference    He has not been on his steroids for a while, dr. Duggna had him on this but pt did not keep the follow up with the hematologist  He was on prednisone 5 mg from Dr. Duggan  Pt would like labs drawn  Simply cannot afford the hematologist appointment    Sleep is doing ok with the ambien  No SE nor issues noted with this medicine  Has been happy with the results    He did not feel the testosterone medicine helped him so he stopped it  Doing fine    The following portions of the patient's history were reviewed and updated as appropriate: allergies, current medications, past family history, past medical history, past social history, past surgical history and problem list.    Review of Systems   Constitutional: Negative.    HENT: Negative.    Eyes: Negative.    Respiratory: Negative.  Negative for shortness of breath.    Cardiovascular: Negative.  Negative for chest pain.   Gastrointestinal: Negative.    Musculoskeletal: Negative.    Skin: Negative.    Neurological: Negative.    Psychiatric/Behavioral: Negative.  Negative for dysphoric mood. The patient is not nervous/anxious.    All other systems reviewed and are negative.      Objective   Physical Exam   Constitutional: He appears well-developed and well-nourished. No distress.   Cardiovascular: Normal rate, regular rhythm and normal heart sounds.   Pulmonary/Chest: Effort normal and breath sounds normal.   Psychiatric: He has a normal mood and affect. His behavior is normal.   Nursing note and vitals reviewed.      Assessment/Plan   Huang was seen today for anxiety.    Diagnoses and all orders for this visit:    Chronic ITP (idiopathic thrombocytopenia) (CMS/[facility])  -     CBC & Differential  -     Comprehensive Metabolic Panel    Generalized anxiety disorder  -     desvenlafaxine (PRISTIQ) 50 MG 24 hr  tablet; Take 1 tablet by mouth Daily.    Other insomnia  -     zolpidem CR (AMBIEN CR) 12.5 MG CR tablet; Take 1 tablet by mouth At Night As Needed for Sleep.    Essential hypertriglyceridemia  -     Comprehensive Metabolic Panel  -     Lipid Panel    Screening for prostate cancer  -     PSA Screen    Other orders  -     Flucelvax Quad (Vial) =>4 yrs ([id number])    will recheck CBC and CMP, consider resumption of steroids pending results  pristiq working ok for mood, no change in pristiq  Continue ambien, this is working well for sleep  chek lipids and PSA, follow up pending labs

## 2019-11-09 LAB
ALBUMIN SERPL-MCNC: 4.7 G/DL (ref 3.5–5.2)
ALBUMIN/GLOB SERPL: 2 G/DL
ALP SERPL-CCNC: 86 U/L (ref 39–117)
ALT SERPL-CCNC: 17 U/L (ref 1–41)
AST SERPL-CCNC: 22 U/L (ref 1–40)
BASOPHILS # BLD AUTO: (no result) 10*3/UL
BILIRUB SERPL-MCNC: 0.4 MG/DL (ref 0.2–1.2)
BUN SERPL-MCNC: 12 MG/DL (ref 8–23)
BUN/CREAT SERPL: 12.2 (ref 7–25)
CALCIUM SERPL-MCNC: 9.1 MG/DL (ref 8.6–10.5)
CHLORIDE SERPL-SCNC: 98 MMOL/L (ref 98–107)
CHOLEST SERPL-MCNC: 196 MG/DL (ref 0–200)
CO2 SERPL-SCNC: 28.6 MMOL/L (ref 22–29)
CREAT SERPL-MCNC: 0.98 MG/DL (ref 0.76–1.27)
DIFFERENTIAL COMMENT: ABNORMAL
EOSINOPHIL # BLD AUTO: (no result) 10*3/UL
EOSINOPHIL # BLD MANUAL: 0.13 10*3/MM3 (ref 0–0.4)
EOSINOPHIL NFR BLD AUTO: (no result) %
EOSINOPHIL NFR BLD MANUAL: 2.1 % (ref 0.3–6.2)
ERYTHROCYTE [DISTWIDTH] IN BLOOD BY AUTOMATED COUNT: 13.3 % (ref 12.3–15.4)
GLOBULIN SER CALC-MCNC: 2.4 GM/DL
GLUCOSE SERPL-MCNC: 96 MG/DL (ref 65–99)
HCT VFR BLD AUTO: 37.3 % (ref 37.5–51)
HDLC SERPL-MCNC: 62 MG/DL (ref 40–60)
HGB BLD-MCNC: 12.5 G/DL (ref 13–17.7)
LDLC SERPL CALC-MCNC: 110 MG/DL (ref 0–100)
LYMPHOCYTES # BLD AUTO: (no result) 10*3/UL
LYMPHOCYTES # BLD MANUAL: 1.49 10*3/MM3 (ref 0.7–3.1)
LYMPHOCYTES NFR BLD AUTO: (no result) %
LYMPHOCYTES NFR BLD MANUAL: 24.7 % (ref 19.6–45.3)
MCH RBC QN AUTO: 29.4 PG (ref 26.6–33)
MCHC RBC AUTO-ENTMCNC: 33.5 G/DL (ref 31.5–35.7)
MCV RBC AUTO: 87.8 FL (ref 79–97)
MONOCYTES # BLD MANUAL: 0.06 10*3/MM3 (ref 0.1–0.9)
MONOCYTES NFR BLD AUTO: (no result) %
MONOCYTES NFR BLD MANUAL: 1 % (ref 5–12)
NEUTROPHILS # BLD MANUAL: 4.36 10*3/MM3 (ref 1.7–7)
NEUTROPHILS NFR BLD AUTO: (no result) %
NEUTROPHILS NFR BLD MANUAL: 72.2 % (ref 42.7–76)
PLATELET # BLD AUTO: 69 10*3/MM3 (ref 140–450)
PLATELET BLD QL SMEAR: ABNORMAL
POTASSIUM SERPL-SCNC: 4.3 MMOL/L (ref 3.5–5.2)
PROT SERPL-MCNC: 7.1 G/DL (ref 6–8.5)
PSA SERPL-MCNC: 3.46 NG/ML (ref 0–4)
RBC # BLD AUTO: 4.25 10*6/MM3 (ref 4.14–5.8)
RBC MORPH BLD: ABNORMAL
SODIUM SERPL-SCNC: 140 MMOL/L (ref 136–145)
TRIGL SERPL-MCNC: 120 MG/DL (ref 0–150)
VLDLC SERPL CALC-MCNC: 24 MG/DL
WBC # BLD AUTO: 6.04 10*3/MM3 (ref 3.4–10.8)

## 2020-04-30 ENCOUNTER — TELEPHONE (OUTPATIENT)
Dept: FAMILY MEDICINE CLINIC | Facility: CLINIC | Age: 60
End: 2020-04-30

## 2020-04-30 DIAGNOSIS — G47.09 OTHER INSOMNIA: ICD-10-CM

## 2020-05-01 RX ORDER — ZOLPIDEM TARTRATE 12.5 MG/1
12.5 TABLET, FILM COATED, EXTENDED RELEASE ORAL NIGHTLY PRN
Qty: 30 TABLET | Refills: 0 | Status: SHIPPED | OUTPATIENT
Start: 2020-05-01 | End: 2020-06-12 | Stop reason: SDUPTHER

## 2020-05-11 ENCOUNTER — TELEPHONE (OUTPATIENT)
Dept: FAMILY MEDICINE CLINIC | Facility: CLINIC | Age: 60
End: 2020-05-11

## 2020-05-11 NOTE — TELEPHONE ENCOUNTER
Pt called because he is almost out of his     zolpidem CR (AMBIEN CR) 12.5 MG CR tablet    He would like to know if PA has been sent because he has 2 more left     [facility]: [facility]  PH: [phone]  FAX: [phone]

## 2020-05-12 ENCOUNTER — TELEPHONE (OUTPATIENT)
Dept: FAMILY MEDICINE CLINIC | Facility: CLINIC | Age: 60
End: 2020-05-12

## 2020-05-12 NOTE — TELEPHONE ENCOUNTER
Patient requesting a call back regarding the status of a prior authorization for: zolpidem CR (AMBIEN CR) 12.5 MG CR tablet    The patient stated that he switched insurance from [employer] to [employer] and [facility] stating that they need a response/explanation from Dr. Brar why the patient needs to take the medication. He stated he spoke to someone yesterday (see telephone encounter 5/12/20) and he stated he has not heard an update. He stated he only has 1 pill left.    Please call patient back and advise @[phone].    Preferred Pharmacy [facility] in [place]

## 2020-05-12 NOTE — TELEPHONE ENCOUNTER
PA pending Key: [id number]. Pt aware.     [facility] ID: [id number] BIN#[id number] PCN#[id number] Group#[id number] Policy#

## 2020-05-12 NOTE — TELEPHONE ENCOUNTER
Dulce at [facility] said they do not have any [employer] on file for pt. Pt calling them now w/ [employer] info. I think that's what the prob is w/ the Ambien. Pt will c/b if there are any issues.

## 2020-06-12 DIAGNOSIS — G47.09 OTHER INSOMNIA: ICD-10-CM

## 2020-06-12 RX ORDER — ZOLPIDEM TARTRATE 12.5 MG/1
12.5 TABLET, FILM COATED, EXTENDED RELEASE ORAL NIGHTLY PRN
Qty: 30 TABLET | Refills: 0 | Status: SHIPPED | OUTPATIENT
Start: 2020-06-12 | End: 2020-07-14 | Stop reason: SDUPTHER

## 2020-07-12 DIAGNOSIS — G47.09 OTHER INSOMNIA: ICD-10-CM

## 2020-07-13 DIAGNOSIS — F41.1 GENERALIZED ANXIETY DISORDER: ICD-10-CM

## 2020-07-13 RX ORDER — DESVENLAFAXINE SUCCINATE 50 MG/1
TABLET, EXTENDED RELEASE ORAL
Qty: 30 TABLET | Refills: 4 | OUTPATIENT
Start: 2020-07-13

## 2020-07-14 ENCOUNTER — OFFICE VISIT (OUTPATIENT)
Dept: FAMILY MEDICINE CLINIC | Facility: CLINIC | Age: 60
End: 2020-07-14

## 2020-07-14 VITALS
WEIGHT: 163 LBS | TEMPERATURE: 99.3 F | RESPIRATION RATE: 18 BRPM | DIASTOLIC BLOOD PRESSURE: 98 MMHG | SYSTOLIC BLOOD PRESSURE: 148 MMHG | HEIGHT: 69 IN | BODY MASS INDEX: 24.14 KG/M2 | HEART RATE: 80 BPM

## 2020-07-14 DIAGNOSIS — I10 ESSENTIAL HYPERTENSION: ICD-10-CM

## 2020-07-14 DIAGNOSIS — D69.3 CHRONIC ITP (IDIOPATHIC THROMBOCYTOPENIA) (HCC): ICD-10-CM

## 2020-07-14 DIAGNOSIS — Z00.00 MEDICARE ANNUAL WELLNESS VISIT, SUBSEQUENT: Primary | ICD-10-CM

## 2020-07-14 DIAGNOSIS — F41.1 GENERALIZED ANXIETY DISORDER: ICD-10-CM

## 2020-07-14 DIAGNOSIS — G47.09 OTHER INSOMNIA: ICD-10-CM

## 2020-07-14 DIAGNOSIS — E78.1 ESSENTIAL HYPERTRIGLYCERIDEMIA: ICD-10-CM

## 2020-07-14 PROCEDURE — G0439 PPPS, SUBSEQ VISIT: HCPCS | Performed by: FAMILY MEDICINE

## 2020-07-14 PROCEDURE — 99396 PREV VISIT EST AGE 40-64: CPT | Performed by: FAMILY MEDICINE

## 2020-07-14 RX ORDER — LISINOPRIL AND HYDROCHLOROTHIAZIDE 25; 20 MG/1; MG/1
1 TABLET ORAL DAILY
Qty: 30 TABLET | Refills: 5 | Status: SHIPPED | OUTPATIENT
Start: 2020-07-14 | End: 2020-08-14

## 2020-07-14 RX ORDER — ZOLPIDEM TARTRATE 12.5 MG/1
12.5 TABLET, FILM COATED, EXTENDED RELEASE ORAL NIGHTLY PRN
Qty: 30 TABLET | Refills: 5 | Status: SHIPPED | OUTPATIENT
Start: 2020-07-14 | End: 2020-12-14 | Stop reason: SDUPTHER

## 2020-07-14 RX ORDER — DESVENLAFAXINE SUCCINATE 50 MG/1
50 TABLET, EXTENDED RELEASE ORAL DAILY
Qty: 30 TABLET | Refills: 5 | Status: SHIPPED | OUTPATIENT
Start: 2020-07-14 | End: 2020-12-14 | Stop reason: SDUPTHER

## 2020-07-14 RX ORDER — ZOLPIDEM TARTRATE 12.5 MG/1
TABLET, FILM COATED, EXTENDED RELEASE ORAL
Qty: 30 TABLET | Refills: 0 | OUTPATIENT
Start: 2020-07-14

## 2020-07-14 NOTE — PROGRESS NOTES
The ABCs of the Annual Wellness Visit  Subsequent Medicare Wellness Visit    Chief Complaint   Patient presents with   • Medicare Wellness-subsequent   • Med Refill       Subjective   History of Present Illness:  Huang Almonte is a 59 y.o. male who presents for a Subsequent Medicare Wellness Visit.    HEALTH RISK ASSESSMENT    Recent Hospitalizations:  No hospitalization(s) within the last year.    Current Medical Providers:  Patient Care Team:  Jeremías Brar MD as PCP - General    Smoking Status:  Social History     Tobacco Use   Smoking Status Former Smoker   • Packs/day: 1.00   • Years: 40.00   • Pack years: 40.00   • Types: Cigarettes   • Last attempt to quit: 2019   • Years since quittin.4   Smokeless Tobacco Never Used       Alcohol Consumption:  Social History     Substance and Sexual Activity   Alcohol Use Yes    Comment: trying to quit       Depression Screen:   PHQ-2/PHQ-9 Depression Screening 2020   Little interest or pleasure in doing things 0   Feeling down, depressed, or hopeless 1   Trouble falling or staying asleep, or sleeping too much -   Feeling tired or having little energy -   Poor appetite or overeating -   Feeling bad about yourself - or that you are a failure or have let yourself or your family down -   Trouble concentrating on things, such as reading the newspaper or watching television -   Moving or speaking so slowly that other people could have noticed. Or the opposite - being so fidgety or restless that you have been moving around a lot more than usual -   Thoughts that you would be better off dead, or of hurting yourself in some way -   Total Score 1   If you checked off any problems, how difficult have these problems made it for you to do your work, take care of things at home, or get along with other people? -       Fall Risk Screen:  STEADI Fall Risk Assessment has not been completed.    Health Habits and Functional and Cognitive Screening:  Functional & Cognitive Status  7/14/2020   Do you have difficulty preparing food and eating? No   Do you have difficulty bathing yourself, getting dressed or grooming yourself? No   Do you have difficulty using the toilet? No   Do you have difficulty moving around from place to place? No   Do you have trouble with steps or getting out of a bed or a chair? No   Current Diet Unhealthy Diet   Dental Exam Not up to date   Eye Exam Not up to date   Exercise (times per week) 3 times per week   Current Exercise Activities Include Walking   Do you need help using the phone?  No   Are you deaf or do you have serious difficulty hearing?  No   Do you need help with transportation? No   Do you need help shopping? No   Do you need help preparing meals?  No   Do you need help with housework?  No   Do you need help with laundry? No   Do you need help taking your medications? No   Do you need help managing money? No   Do you ever drive or ride in a car without wearing a seat belt? No   Have you felt unusual stress, anger or loneliness in the last month? No   Who do you live with? Alone   If you need help, do you have trouble finding someone available to you? No   Have you been bothered in the last four weeks by sexual problems? No   Do you have difficulty concentrating, remembering or making decisions? No         Does the patient have evidence of cognitive impairment? No      Age-appropriate Screening Schedule:  Refer to the list below for future screening recommendations based on patient's age, sex and/or medical conditions. Orders for these recommended tests are listed in the plan section. The patient has been provided with a written plan.    Health Maintenance   Topic Date Due   • ZOSTER VACCINE (1 of 2) 10/14/2009   • INFLUENZA VACCINE  08/01/2020   • COLONOSCOPY  10/11/2020   • LIPID PANEL  11/08/2020   • TDAP/TD VACCINES (2 - Td) 10/16/2028          The following portions of the patient's history were reviewed and updated as appropriate: allergies, current  medications, past family history, past medical history, past social history, past surgical history and problem list.    Outpatient Medications Prior to Visit   Medication Sig Dispense Refill   • aspirin 81 MG EC tablet Take 1 tablet by mouth Daily. 30 tablet 11   • gabapentin (NEURONTIN) 400 MG capsule Gabapentin 400 MG Oral Capsule; Patient Sig: Gabapentin 400 MG Oral Capsule ; 90; 0; 07-May-2015; Active     • HYDROcodone-acetaminophen (NORCO)  MG per tablet      • desvenlafaxine (PRISTIQ) 50 MG 24 hr tablet Take 1 tablet by mouth Daily. 30 tablet 5   • zolpidem CR (AMBIEN CR) 12.5 MG CR tablet Take 1 tablet by mouth At Night As Needed for Sleep. 30 tablet 0     No facility-administered medications prior to visit.        Patient Active Problem List   Diagnosis   • Back pain   • Generalized anxiety disorder   • Essential hypertriglyceridemia   • Current every day smoker   • Insomnia   • Cataract   • Chronic ITP (idiopathic thrombocytopenia) (CMS/[facility])   • Hypogonadism in male   • Essential hypertension       Advanced Care Planning:  ACP discussion was held with the patient during this visit. Patient does not have an advance directive, information provided.    Review of Systems   Constitutional: Negative.  Negative for fatigue.   HENT: Negative.    Eyes: Negative.    Respiratory: Negative.  Negative for shortness of breath.    Cardiovascular: Negative.  Negative for chest pain.   Gastrointestinal: Positive for constipation (occasionally). Negative for diarrhea.   Musculoskeletal: Positive for back pain.   Skin: Negative.    Neurological: Negative.    Psychiatric/Behavioral: Negative for dysphoric mood. The patient is not nervous/anxious.    All other systems reviewed and are negative.      Compared to one year ago, the patient feels his physical health is the same.  Compared to one year ago, the patient feels his mental health is the same.    Reviewed chart for potential of high risk medication in the elderly:  "yes  Reviewed chart for potential of harmful drug interactions in the elderly:yes    Objective         Vitals:    07/14/20 1125   BP: 148/98   Pulse: 80   Resp: 18   Temp: 99.3 °F (37.4 °C)   Weight: 73.9 kg (163 lb)   Height: 175.3 cm (69\")       Body mass index is 24.07 kg/m².  Discussed the patient's BMI with him. The BMI is in the acceptable range.    Physical Exam   Constitutional: He is oriented to person, place, and time. He appears well-developed and well-nourished. No distress.   HENT:   Head: Normocephalic and atraumatic.   Right Ear: Hearing, tympanic membrane, external ear and ear canal normal.   Left Ear: Hearing, tympanic membrane, external ear and ear canal normal.   Nose: Nose normal.   Eyes: Conjunctivae and EOM are normal.   Neck: Normal range of motion. Neck supple. No thyromegaly present.   Cardiovascular: Normal rate, regular rhythm and normal heart sounds.   No murmur heard.  Pulmonary/Chest: Effort normal and breath sounds normal. No respiratory distress.   Abdominal: Soft. Bowel sounds are normal. He exhibits no distension and no mass. There is no tenderness.   Musculoskeletal:   Stooped gait   Lymphadenopathy:     He has no cervical adenopathy.   Neurological: He is alert and oriented to person, place, and time.   Skin: Skin is warm and dry.   Psychiatric: He has a normal mood and affect. His behavior is normal. Judgment and thought content normal.   Nursing note and vitals reviewed.            Assessment/Plan   Medicare Risks and Personalized Health Plan  CMS Preventative Services Quick Reference  Advance Directive Discussion  Dementia/Memory   Depression/Dysphoria  Fall Risk  Inactivity/Sedentary    The above risks/problems have been discussed with the patient.  Pertinent information has been shared with the patient in the After Visit Summary.  Follow up plans and orders are seen below in the Assessment/Plan Section.    Diagnoses and all orders for this visit:    1. Medicare annual " wellness visit, subsequent (Primary)    2. Generalized anxiety disorder  -     desvenlafaxine (Pristiq) 50 MG 24 hr tablet; Take 1 tablet by mouth Daily.  Dispense: 30 tablet; Refill: 5    3. Other insomnia  -     zolpidem CR (AMBIEN CR) 12.5 MG CR tablet; Take 1 tablet by mouth At Night As Needed for Sleep.  Dispense: 30 tablet; Refill: 5    4. Essential hypertension  -     CBC & Differential  -     Comprehensive Metabolic Panel  -     lisinopril-hydrochlorothiazide (PRINZIDE,ZESTORETIC) 20-25 MG per tablet; Take 1 tablet by mouth Daily.  Dispense: 30 tablet; Refill: 5    5. Chronic ITP (idiopathic thrombocytopenia) (CMS/HCC)  -     CBC & Differential    6. Essential hypertriglyceridemia  -     CBC & Differential  -     Comprehensive Metabolic Panel  -     Lipid Panel      Counseled about well adult care and encouraged diet and exercise as tolerated.  Mood doing ok, will continue pristiq  Refilled ambien, this continues to help him sleep  Will recheck platelets for history of ITP  BP too high, will start prinzide and recheck in one month    Follow Up:  Return in about 1 month (around 8/14/2020).     An After Visit Summary and PPPS were given to the patient.

## 2020-07-15 LAB
ALBUMIN SERPL-MCNC: 4.8 G/DL (ref 3.5–5.2)
ALBUMIN/GLOB SERPL: 2.4 G/DL
ALP SERPL-CCNC: 87 U/L (ref 39–117)
ALT SERPL-CCNC: 35 U/L (ref 1–41)
AST SERPL-CCNC: 28 U/L (ref 1–40)
BASOPHILS # BLD AUTO: ABNORMAL 10*3/UL
BILIRUB SERPL-MCNC: 0.3 MG/DL (ref 0–1.2)
BUN SERPL-MCNC: 15 MG/DL (ref 8–23)
BUN/CREAT SERPL: 12.5 (ref 7–25)
CALCIUM SERPL-MCNC: 9.3 MG/DL (ref 8.6–10.5)
CHLORIDE SERPL-SCNC: 101 MMOL/L (ref 98–107)
CHOLEST SERPL-MCNC: 204 MG/DL (ref 0–200)
CO2 SERPL-SCNC: 28.9 MMOL/L (ref 22–29)
CREAT SERPL-MCNC: 1.2 MG/DL (ref 0.76–1.27)
DIFFERENTIAL COMMENT: ABNORMAL
EOSINOPHIL # BLD AUTO: ABNORMAL 10*3/UL
EOSINOPHIL NFR BLD AUTO: ABNORMAL %
ERYTHROCYTE [DISTWIDTH] IN BLOOD BY AUTOMATED COUNT: 14.5 % (ref 12.3–15.4)
GLOBULIN SER CALC-MCNC: 2 GM/DL
GLUCOSE SERPL-MCNC: 108 MG/DL (ref 65–99)
HCT VFR BLD AUTO: 38.1 % (ref 37.5–51)
HDLC SERPL-MCNC: 53 MG/DL (ref 40–60)
HGB BLD-MCNC: 12.7 G/DL (ref 13–17.7)
LDLC SERPL CALC-MCNC: 112 MG/DL (ref 0–100)
LYMPHOCYTES # BLD AUTO: ABNORMAL 10*3/UL
LYMPHOCYTES # BLD MANUAL: 1.77 10*3/MM3 (ref 0.7–3.1)
LYMPHOCYTES NFR BLD AUTO: ABNORMAL %
LYMPHOCYTES NFR BLD MANUAL: 28 % (ref 19.6–45.3)
MCH RBC QN AUTO: 29.5 PG (ref 26.6–33)
MCHC RBC AUTO-ENTMCNC: 33.3 G/DL (ref 31.5–35.7)
MCV RBC AUTO: 88.6 FL (ref 79–97)
MONOCYTES # BLD MANUAL: 0.25 10*3/MM3 (ref 0.1–0.9)
MONOCYTES NFR BLD AUTO: ABNORMAL %
MONOCYTES NFR BLD MANUAL: 4 % (ref 5–12)
NEUTROPHILS # BLD MANUAL: 4.18 10*3/MM3 (ref 1.7–7)
NEUTROPHILS NFR BLD AUTO: ABNORMAL %
NEUTROPHILS NFR BLD MANUAL: 66 % (ref 42.7–76)
PLATELET # BLD AUTO: 63 X10E3/UL (ref 150–450)
PLATELET # BLD AUTO: 70 10*3/MM3 (ref 140–450)
PLATELET BLD QL SMEAR: ABNORMAL
POTASSIUM SERPL-SCNC: 5.2 MMOL/L (ref 3.5–5.2)
PROT SERPL-MCNC: 6.8 G/DL (ref 6–8.5)
RBC # BLD AUTO: 4.3 10*6/MM3 (ref 4.14–5.8)
RBC MORPH BLD: ABNORMAL
SODIUM SERPL-SCNC: 140 MMOL/L (ref 136–145)
TRIGL SERPL-MCNC: 197 MG/DL (ref 0–150)
VLDLC SERPL CALC-MCNC: 39.4 MG/DL
WBC # BLD AUTO: 6.33 10*3/MM3 (ref 3.4–10.8)

## 2020-08-10 ENCOUNTER — TELEPHONE (OUTPATIENT)
Dept: FAMILY MEDICINE CLINIC | Facility: CLINIC | Age: 60
End: 2020-08-10

## 2020-08-10 NOTE — TELEPHONE ENCOUNTER
Patient started taking lisinopril-hydrochlorothiazide (PRINZIDE,ZESTORETIC) 20-25 MG per tablet and since he started he has been experiencing  lightheadness and dizziness.  Patient has stopped taking medicine and has not had any symptoms since then.  Patient would like to know if he needs to continue taking it by cutting pills in half or if something else can be called in instead.  Patient would like a call back... Please  Advise    Huang Almonte call back [phone]

## 2020-08-14 ENCOUNTER — TELEPHONE (OUTPATIENT)
Dept: FAMILY MEDICINE CLINIC | Facility: CLINIC | Age: 60
End: 2020-08-14

## 2020-08-14 ENCOUNTER — OFFICE VISIT (OUTPATIENT)
Dept: FAMILY MEDICINE CLINIC | Facility: CLINIC | Age: 60
End: 2020-08-14

## 2020-08-14 VITALS
DIASTOLIC BLOOD PRESSURE: 86 MMHG | TEMPERATURE: 97.8 F | HEART RATE: 98 BPM | HEIGHT: 69 IN | WEIGHT: 161 LBS | BODY MASS INDEX: 23.85 KG/M2 | RESPIRATION RATE: 18 BRPM | SYSTOLIC BLOOD PRESSURE: 137 MMHG

## 2020-08-14 DIAGNOSIS — I10 ESSENTIAL HYPERTENSION: Primary | ICD-10-CM

## 2020-08-14 DIAGNOSIS — D69.3 CHRONIC ITP (IDIOPATHIC THROMBOCYTOPENIA) (HCC): ICD-10-CM

## 2020-08-14 PROCEDURE — 99213 OFFICE O/P EST LOW 20 MIN: CPT | Performed by: FAMILY MEDICINE

## 2020-08-14 RX ORDER — LISINOPRIL AND HYDROCHLOROTHIAZIDE 12.5; 1 MG/1; MG/1
1 TABLET ORAL DAILY
Qty: 30 TABLET | Refills: 5 | Status: SHIPPED | OUTPATIENT
Start: 2020-08-14 | End: 2020-12-14 | Stop reason: SDUPTHER

## 2020-08-14 NOTE — PROGRESS NOTES
Subjective   Huang Almonte is a 59 y.o. male.     History of Present Illness     He was dizzy at home when he was taking a whole pill at home for his BP  We had placed him on prinzide 20/25  The half pill is not causing dizziness      Review of Systems   Constitutional: Negative.    Hematological: Does not bruise/bleed easily.       Objective   Physical Exam   Constitutional: He appears well-developed and well-nourished. No distress.   Cardiovascular: Normal rate, regular rhythm and normal heart sounds.   Pulmonary/Chest: Effort normal and breath sounds normal.   Psychiatric: He has a normal mood and affect. His behavior is normal.   Nursing note and vitals reviewed.      Assessment/Plan   Huang was seen today for hypertension.    Diagnoses and all orders for this visit:    Essential hypertension  -     lisinopril-hydrochlorothiazide (Zestoretic) 10-12.5 MG per tablet; Take 1 tablet by mouth Daily.    Chronic ITP (idiopathic thrombocytopenia) (CMS/HCC)    BP is doing well on the 10/12.5 dose.  Will continue this and his BP is controlled without dizziness

## 2020-08-14 NOTE — TELEPHONE ENCOUNTER
Debbie form [facility] called and stated she has a question regarding the patients lisinopril-hydrochlorothiazide (Zestoretic) 10-12.5 MG per tablet. She states that the medication is on back order and would like to know if the medication can just be split up into the 2 components and administered to the patient this way. Please advise.     Pharmacy call back [phone]

## 2020-08-14 NOTE — TELEPHONE ENCOUNTER
PT CALLED STATED THAT  SENT IN SOMETIME TYPE OF BLOOD PRESSURE MEDICINE BUT PHARMACY DOES NOT HAVE IT IN ONE PILL BUT IN 2 PILL FORM.    PLEASE ADVISE.  CALL BACK:[phone]     PT NOT SURE NAME OF MEDICATION.    [facility] - [place], [place] - [address]

## 2020-10-02 ENCOUNTER — TELEPHONE (OUTPATIENT)
Dept: FAMILY MEDICINE CLINIC | Facility: CLINIC | Age: 60
End: 2020-10-02

## 2020-10-02 NOTE — TELEPHONE ENCOUNTER
Probably will need to get these from pain clinic due to PA required and he is getting treatment from them for his back

## 2020-10-02 NOTE — TELEPHONE ENCOUNTER
Caller: Huang Almonte    Relationship: Self    Best call back number: [phone]     What medication are you requesting: LIDOCAINE PATCHES 5% 10 CM X 14 CM     What are your current symptoms: BACK PAIN.      How long have you been experiencing symptoms: 5 YEARS PLUS    Have you had these symptoms before:    [x] Yes  [] No    Have you been treated for these symptoms before:   [x] Yes  [] No    If a prescription is needed, what is your preferred pharmacy and phone number[place] [facility] [place] [address] [address] [facility] [phone] [facility] [phone]      Additional notes:   THE PATIENT STATES HIS PAIN DR TOLD HIM TO CONTACT HIS PCP TO GET A PRESCRIPTION FOR THE LIDOCAINE PATCHES.    PLEASE CALL PATIENT IF ANY QUESTIONS, PLEASE CALL PATIENT AT   [phone]

## 2020-12-14 ENCOUNTER — OFFICE VISIT (OUTPATIENT)
Dept: FAMILY MEDICINE CLINIC | Facility: CLINIC | Age: 60
End: 2020-12-14

## 2020-12-14 VITALS
HEART RATE: 84 BPM | TEMPERATURE: 97.7 F | WEIGHT: 170 LBS | HEIGHT: 69 IN | RESPIRATION RATE: 18 BRPM | BODY MASS INDEX: 25.18 KG/M2 | DIASTOLIC BLOOD PRESSURE: 82 MMHG | SYSTOLIC BLOOD PRESSURE: 136 MMHG

## 2020-12-14 DIAGNOSIS — Z12.5 SCREENING FOR PROSTATE CANCER: ICD-10-CM

## 2020-12-14 DIAGNOSIS — R49.0 HOARSENESS OF VOICE: ICD-10-CM

## 2020-12-14 DIAGNOSIS — G47.09 OTHER INSOMNIA: ICD-10-CM

## 2020-12-14 DIAGNOSIS — I10 ESSENTIAL HYPERTENSION: Primary | ICD-10-CM

## 2020-12-14 DIAGNOSIS — D69.3 CHRONIC ITP (IDIOPATHIC THROMBOCYTOPENIA) (HCC): ICD-10-CM

## 2020-12-14 DIAGNOSIS — F41.1 GENERALIZED ANXIETY DISORDER: ICD-10-CM

## 2020-12-14 PROCEDURE — 99214 OFFICE O/P EST MOD 30 MIN: CPT | Performed by: FAMILY MEDICINE

## 2020-12-14 RX ORDER — LISINOPRIL AND HYDROCHLOROTHIAZIDE 12.5; 1 MG/1; MG/1
1 TABLET ORAL DAILY
Qty: 30 TABLET | Refills: 5 | Status: SHIPPED | OUTPATIENT
Start: 2020-12-14 | End: 2021-03-16

## 2020-12-14 RX ORDER — ZOLPIDEM TARTRATE 12.5 MG/1
12.5 TABLET, FILM COATED, EXTENDED RELEASE ORAL NIGHTLY PRN
Qty: 30 TABLET | Refills: 5 | Status: SHIPPED | OUTPATIENT
Start: 2020-12-14 | End: 2021-06-22 | Stop reason: SDUPTHER

## 2020-12-14 RX ORDER — DESVENLAFAXINE SUCCINATE 50 MG/1
50 TABLET, EXTENDED RELEASE ORAL DAILY
Qty: 30 TABLET | Refills: 5 | Status: SHIPPED | OUTPATIENT
Start: 2020-12-14 | End: 2021-06-22 | Stop reason: SDUPTHER

## 2020-12-14 NOTE — PROGRESS NOTES
Subjective   Huang Almonte is a 60 y.o. male.     History of Present Illness     Huang Almonte  is here for follow-up of hypertension of several years duration. He is not exercising and is not adherent to a low-salt diet. Patient does not check his blood pressure.   Patient denies chest pain and palpitations. Cardiovascular risk factors: hypertension and male gender. He is compliant with meds.    Mood is doing well with pristiq and sleep is doing well with ambien  No complaints about these medicines  Overall foing well    He has seen Dr. Reveles before for his platelets  No recent bleeding issues notes      For the last 2-3 days he has had some hoarsenss  This is new    The following portions of the patient's history were reviewed and updated as appropriate: allergies, current medications, past family history, past medical history, past social history, past surgical history and problem list.    Review of Systems   Constitutional: Negative.    HENT: Positive for voice change.    Eyes: Negative.    Respiratory: Negative.  Negative for shortness of breath.    Cardiovascular: Negative.  Negative for chest pain.   Gastrointestinal: Negative.    Musculoskeletal: Negative.    Skin: Negative.    Neurological: Negative.    Psychiatric/Behavioral: Negative.  Negative for dysphoric mood. The patient is not nervous/anxious.    All other systems reviewed and are negative.      Objective   Physical Exam  Vitals signs and nursing note reviewed.   Constitutional:       General: He is not in acute distress.     Appearance: Normal appearance. He is well-developed.   Cardiovascular:      Rate and Rhythm: Normal rate and regular rhythm.      Heart sounds: Normal heart sounds.   Pulmonary:      Effort: Pulmonary effort is normal.      Breath sounds: Normal breath sounds.   Neurological:      Mental Status: He is alert and oriented to person, place, and time.   Psychiatric:         Mood and Affect: Mood normal.         Behavior: Behavior  normal.         Thought Content: Thought content normal.         Judgment: Judgment normal.         Assessment/Plan   Diagnoses and all orders for this visit:    1. Essential hypertension (Primary)  -     CBC & Differential  -     Comprehensive Metabolic Panel  -     lisinopril-hydrochlorothiazide (Zestoretic) 10-12.5 MG per tablet; Take 1 tablet by mouth Daily.  Dispense: 30 tablet; Refill: 5  -     Uric Acid    2. Generalized anxiety disorder  -     desvenlafaxine (Pristiq) 50 MG 24 hr tablet; Take 1 tablet by mouth Daily.  Dispense: 30 tablet; Refill: 5    3. Other insomnia  -     zolpidem CR (AMBIEN CR) 12.5 MG CR tablet; Take 1 tablet by mouth At Night As Needed for Sleep.  Dispense: 30 tablet; Refill: 5    4. Chronic ITP (idiopathic thrombocytopenia) (CMS/HCC)  -     CBC & Differential    5. Hoarseness of voice    6. Screening for prostate cancer  -     PSA Screen    BP stable, continue medicine and check labs  pristiq to be continued for his mood  Ok for ambiuen, sleep doing well.  kimber iverson  Will recheck CBC and continu to monitor here.  Back to Dr. Duggan, hematologist, in future with any acute or worsening issues.  Consider ENT for voice change if this does not improve.

## 2020-12-15 ENCOUNTER — TELEPHONE (OUTPATIENT)
Dept: FAMILY MEDICINE CLINIC | Facility: CLINIC | Age: 60
End: 2020-12-15

## 2020-12-15 DIAGNOSIS — R49.0 HOARSENESS OF VOICE: Primary | ICD-10-CM

## 2020-12-15 LAB
ALBUMIN SERPL-MCNC: 4.5 G/DL (ref 3.8–4.8)
ALBUMIN/GLOB SERPL: 1.8 {RATIO} (ref 1.2–2.2)
ALP SERPL-CCNC: 86 IU/L (ref 39–117)
ALT SERPL-CCNC: 14 IU/L (ref 0–44)
AST SERPL-CCNC: 20 IU/L (ref 0–40)
BASOPHILS # BLD AUTO: 0.1 X10E3/UL (ref 0–0.2)
BASOPHILS NFR BLD AUTO: 2 %
BILIRUB SERPL-MCNC: 0.3 MG/DL (ref 0–1.2)
BUN SERPL-MCNC: 17 MG/DL (ref 8–27)
BUN/CREAT SERPL: 12 (ref 10–24)
CALCIUM SERPL-MCNC: 9.4 MG/DL (ref 8.6–10.2)
CHLORIDE SERPL-SCNC: 102 MMOL/L (ref 96–106)
CO2 SERPL-SCNC: 23 MMOL/L (ref 20–29)
CREAT SERPL-MCNC: 1.37 MG/DL (ref 0.76–1.27)
EOSINOPHIL # BLD AUTO: 0.1 X10E3/UL (ref 0–0.4)
EOSINOPHIL NFR BLD AUTO: 2 %
ERYTHROCYTE [DISTWIDTH] IN BLOOD BY AUTOMATED COUNT: 12.9 % (ref 11.6–15.4)
GLOBULIN SER CALC-MCNC: 2.5 G/DL (ref 1.5–4.5)
GLUCOSE SERPL-MCNC: 109 MG/DL (ref 65–99)
HCT VFR BLD AUTO: 32.2 % (ref 37.5–51)
HGB BLD-MCNC: 10.4 G/DL (ref 13–17.7)
IMM GRANULOCYTES # BLD AUTO: 0 X10E3/UL (ref 0–0.1)
IMM GRANULOCYTES NFR BLD AUTO: 0 %
LYMPHOCYTES # BLD AUTO: 2.2 X10E3/UL (ref 0.7–3.1)
LYMPHOCYTES NFR BLD AUTO: 33 %
MCH RBC QN AUTO: 27.3 PG (ref 26.6–33)
MCHC RBC AUTO-ENTMCNC: 32.3 G/DL (ref 31.5–35.7)
MCV RBC AUTO: 85 FL (ref 79–97)
MONOCYTES # BLD AUTO: 0.5 X10E3/UL (ref 0.1–0.9)
MONOCYTES NFR BLD AUTO: 7 %
MORPHOLOGY BLD-IMP: ABNORMAL
NEUTROPHILS # BLD AUTO: 3.8 X10E3/UL (ref 1.4–7)
NEUTROPHILS NFR BLD AUTO: 56 %
PLATELET # BLD AUTO: 67 X10E3/UL (ref 150–450)
POTASSIUM SERPL-SCNC: 4.1 MMOL/L (ref 3.5–5.2)
PROT SERPL-MCNC: 7 G/DL (ref 6–8.5)
PSA SERPL-MCNC: 3.9 NG/ML (ref 0–4)
RBC # BLD AUTO: 3.81 X10E6/UL (ref 4.14–5.8)
SODIUM SERPL-SCNC: 141 MMOL/L (ref 134–144)
URATE SERPL-MCNC: 7.3 MG/DL (ref 3.8–8.4)
WBC # BLD AUTO: 6.6 X10E3/UL (ref 3.4–10.8)

## 2020-12-15 RX ORDER — PREDNISONE 20 MG/1
40 TABLET ORAL DAILY
Qty: 10 TABLET | Refills: 0 | Status: SHIPPED | OUTPATIENT
Start: 2020-12-15 | End: 2021-03-16

## 2020-12-15 NOTE — TELEPHONE ENCOUNTER
Pt called and was expecting something to be called in for his hoarseness. Steroid or antibiotic or both?

## 2021-03-16 ENCOUNTER — OFFICE VISIT (OUTPATIENT)
Dept: FAMILY MEDICINE CLINIC | Facility: CLINIC | Age: 61
End: 2021-03-16

## 2021-03-16 VITALS
WEIGHT: 169.8 LBS | HEART RATE: 92 BPM | TEMPERATURE: 98.6 F | SYSTOLIC BLOOD PRESSURE: 150 MMHG | RESPIRATION RATE: 18 BRPM | DIASTOLIC BLOOD PRESSURE: 92 MMHG | HEIGHT: 69 IN | BODY MASS INDEX: 25.15 KG/M2

## 2021-03-16 DIAGNOSIS — I10 ESSENTIAL HYPERTENSION: ICD-10-CM

## 2021-03-16 DIAGNOSIS — D69.3 CHRONIC ITP (IDIOPATHIC THROMBOCYTOPENIA) (HCC): Primary | ICD-10-CM

## 2021-03-16 PROCEDURE — 99214 OFFICE O/P EST MOD 30 MIN: CPT | Performed by: FAMILY MEDICINE

## 2021-03-16 RX ORDER — OXYCODONE AND ACETAMINOPHEN 7.5; 325 MG/1; MG/1
TABLET ORAL
COMMUNITY
Start: 2021-03-05

## 2021-03-16 RX ORDER — LISINOPRIL AND HYDROCHLOROTHIAZIDE 20; 12.5 MG/1; MG/1
1 TABLET ORAL DAILY
Qty: 30 TABLET | Refills: 2 | Status: SHIPPED | OUTPATIENT
Start: 2021-03-16 | End: 2021-06-10

## 2021-03-16 NOTE — PROGRESS NOTES
Subjective   Huang Almonte is a 60 y.o. male.     History of Present Illness     Due to his low platelets the dentist wants us to see him  He has never had any bleeding issues at all  He has seen Dr. Venkata Duggan at [facility] for this in the past  [facility] would like his levels checked    He is taking his BP medicine but his BP has high today  I rechecked at 142/92  His BP has been high at the dentist and the pain clinic as well  20/25 was too much medicine in the past and he felt dizzy with the high dose    The following portions of the patient's history were reviewed and updated as appropriate: allergies, current medications, past family history, past medical history, past social history, past surgical history and problem list.    Review of Systems   Constitutional: Negative.    Hematological: Does not bruise/bleed easily.   Psychiatric/Behavioral: Negative.        Objective   Physical Exam  Vitals and nursing note reviewed.   Constitutional:       General: He is not in acute distress.     Appearance: Normal appearance. He is well-developed.   Cardiovascular:      Rate and Rhythm: Normal rate and regular rhythm.      Heart sounds: Normal heart sounds.   Pulmonary:      Effort: Pulmonary effort is normal.      Breath sounds: Normal breath sounds.   Neurological:      Mental Status: He is alert and oriented to person, place, and time.   Psychiatric:         Mood and Affect: Mood normal.         Behavior: Behavior normal.         Thought Content: Thought content normal.         Judgment: Judgment normal.         Assessment/Plan   Diagnoses and all orders for this visit:    1. Chronic ITP (idiopathic thrombocytopenia) (CMS/HCC) (Primary)  -     CBC & Differential    2. Essential hypertension  -     lisinopril-hydrochlorothiazide (PRINZIDE,ZESTORETIC) 20-12.5 MG per tablet; Take 1 tablet by mouth Daily.  Dispense: 30 tablet; Refill: 2    pt does not have any history of bleeding problems, his platelets have been steady.  Will  recheck levels and then plan clearing him for surgery and writing letter for [facility]    BP remains a little high, will increase dose pr prinzide form 10/21.5 to 20/12.5 and recheck in future.

## 2021-03-17 LAB
BASOPHILS # BLD AUTO: 0.1 X10E3/UL (ref 0–0.2)
BASOPHILS NFR BLD AUTO: 1 %
EOSINOPHIL # BLD AUTO: 0.1 X10E3/UL (ref 0–0.4)
EOSINOPHIL NFR BLD AUTO: 2 %
ERYTHROCYTE [DISTWIDTH] IN BLOOD BY AUTOMATED COUNT: 16 % (ref 11.6–15.4)
HCT VFR BLD AUTO: 33 % (ref 37.5–51)
HGB BLD-MCNC: 10.5 G/DL (ref 13–17.7)
IMM GRANULOCYTES # BLD AUTO: 0 X10E3/UL (ref 0–0.1)
IMM GRANULOCYTES NFR BLD AUTO: 0 %
LYMPHOCYTES # BLD AUTO: 1.7 X10E3/UL (ref 0.7–3.1)
LYMPHOCYTES NFR BLD AUTO: 27 %
MCH RBC QN AUTO: 27.5 PG (ref 26.6–33)
MCHC RBC AUTO-ENTMCNC: 31.8 G/DL (ref 31.5–35.7)
MCV RBC AUTO: 86 FL (ref 79–97)
MONOCYTES # BLD AUTO: 0.4 X10E3/UL (ref 0.1–0.9)
MONOCYTES NFR BLD AUTO: 7 %
MORPHOLOGY BLD-IMP: ABNORMAL
NEUTROPHILS # BLD AUTO: 3.9 X10E3/UL (ref 1.4–7)
NEUTROPHILS NFR BLD AUTO: 63 %
PLATELET # BLD AUTO: 73 X10E3/UL (ref 150–450)
RBC # BLD AUTO: 3.82 X10E6/UL (ref 4.14–5.8)
WBC # BLD AUTO: 6.2 X10E3/UL (ref 3.4–10.8)

## 2021-04-04 DIAGNOSIS — R49.0 HOARSENESS OF VOICE: ICD-10-CM

## 2021-04-05 RX ORDER — PREDNISONE 20 MG/1
TABLET ORAL
Qty: 10 TABLET | Refills: 0 | OUTPATIENT
Start: 2021-04-05

## 2021-06-10 DIAGNOSIS — I10 ESSENTIAL HYPERTENSION: ICD-10-CM

## 2021-06-10 RX ORDER — LISINOPRIL AND HYDROCHLOROTHIAZIDE 20; 12.5 MG/1; MG/1
TABLET ORAL
Qty: 30 TABLET | Refills: 1 | Status: SHIPPED | OUTPATIENT
Start: 2021-06-10 | End: 2021-06-22 | Stop reason: SDUPTHER

## 2021-06-22 ENCOUNTER — OFFICE VISIT (OUTPATIENT)
Dept: FAMILY MEDICINE CLINIC | Facility: CLINIC | Age: 61
End: 2021-06-22

## 2021-06-22 VITALS
DIASTOLIC BLOOD PRESSURE: 84 MMHG | WEIGHT: 172 LBS | HEIGHT: 69 IN | RESPIRATION RATE: 18 BRPM | BODY MASS INDEX: 25.48 KG/M2 | SYSTOLIC BLOOD PRESSURE: 140 MMHG | TEMPERATURE: 98.4 F | HEART RATE: 88 BPM

## 2021-06-22 DIAGNOSIS — F41.1 GENERALIZED ANXIETY DISORDER: ICD-10-CM

## 2021-06-22 DIAGNOSIS — D69.3 CHRONIC ITP (IDIOPATHIC THROMBOCYTOPENIA) (HCC): ICD-10-CM

## 2021-06-22 DIAGNOSIS — R09.82 POST-NASAL DISCHARGE: ICD-10-CM

## 2021-06-22 DIAGNOSIS — I10 ESSENTIAL HYPERTENSION: Primary | ICD-10-CM

## 2021-06-22 DIAGNOSIS — G47.09 OTHER INSOMNIA: ICD-10-CM

## 2021-06-22 PROCEDURE — 99214 OFFICE O/P EST MOD 30 MIN: CPT | Performed by: FAMILY MEDICINE

## 2021-06-22 RX ORDER — FEXOFENADINE HCL 180 MG/1
180 TABLET ORAL DAILY
Qty: 30 TABLET | Refills: 3 | Status: SHIPPED | OUTPATIENT
Start: 2021-06-22 | End: 2022-07-25

## 2021-06-22 RX ORDER — ATENOLOL 25 MG/1
25 TABLET ORAL DAILY
Qty: 30 TABLET | Refills: 2 | Status: SHIPPED | OUTPATIENT
Start: 2021-06-22 | End: 2021-08-23 | Stop reason: SDUPTHER

## 2021-06-22 RX ORDER — DESVENLAFAXINE SUCCINATE 50 MG/1
50 TABLET, EXTENDED RELEASE ORAL DAILY
Qty: 30 TABLET | Refills: 5 | Status: SHIPPED | OUTPATIENT
Start: 2021-06-22 | End: 2022-01-03

## 2021-06-22 RX ORDER — ZOLPIDEM TARTRATE 12.5 MG/1
12.5 TABLET, FILM COATED, EXTENDED RELEASE ORAL NIGHTLY PRN
Qty: 30 TABLET | Refills: 5 | Status: SHIPPED | OUTPATIENT
Start: 2021-06-22 | End: 2022-01-03

## 2021-06-22 RX ORDER — LISINOPRIL AND HYDROCHLOROTHIAZIDE 20; 12.5 MG/1; MG/1
1 TABLET ORAL DAILY
Qty: 30 TABLET | Refills: 1 | Status: SHIPPED | OUTPATIENT
Start: 2021-06-22 | End: 2021-08-11

## 2021-06-22 NOTE — PROGRESS NOTES
Subjective   Huang Almonte is a 60 y.o. male.     History of Present Illness     Huang Almonte  is here for follow-up of hypertension of several years duration. He is not exercising and is not adherent to a low-salt diet. Patient does check his blood pressure at outside offices and it has been high on several occasions. It is not well controlled at home. Patient denies chest pain and palpitations. Cardiovascular risk factors: hypertension and male gender. He is compliant with meds.      Mood has been doing ok on pristiq  No SE with this medicine  He does want to continue this medicine    He does sleep well with ambien  Has tried OTC without help  He has had some walking episodes at night    He notes a lost of mucous in the back of his throat  He is on zyrtec      The following portions of the patient's history were reviewed and updated as appropriate: allergies, current medications, past family history, past medical history, past social history, past surgical history and problem list.    Review of Systems   Constitutional: Negative.    HENT: Positive for congestion and postnasal drip.    Respiratory: Positive for cough.    Psychiatric/Behavioral: Negative.        Objective   Physical Exam  Vitals and nursing note reviewed.   Constitutional:       Appearance: He is well-developed.   HENT:      Head: Normocephalic and atraumatic.      Right Ear: Hearing, tympanic membrane, ear canal and external ear normal.      Left Ear: Hearing, tympanic membrane, ear canal and external ear normal.      Nose: Nose normal.      Mouth/Throat:      Pharynx: Uvula midline.   Eyes:      Conjunctiva/sclera: Conjunctivae normal.   Cardiovascular:      Rate and Rhythm: Normal rate and regular rhythm.      Heart sounds: Normal heart sounds.   Pulmonary:      Effort: Pulmonary effort is normal.      Breath sounds: Normal breath sounds.   Musculoskeletal:      Cervical back: Normal range of motion.   Lymphadenopathy:      Cervical: No cervical  adenopathy.   Psychiatric:         Behavior: Behavior normal.         Assessment/Plan   Diagnoses and all orders for this visit:    1. Essential hypertension (Primary)  -     lisinopril-hydrochlorothiazide (PRINZIDE,ZESTORETIC) 20-12.5 MG per tablet; Take 1 tablet by mouth Daily.  Dispense: 30 tablet; Refill: 1  -     atenolol (Tenormin) 25 MG tablet; Take 1 tablet by mouth Daily.  Dispense: 30 tablet; Refill: 2  -     CBC & Differential  -     Comprehensive Metabolic Panel  -     Lipid Panel    2. Chronic ITP (idiopathic thrombocytopenia) (CMS/HCC)  -     CBC & Differential    3. Post-nasal discharge  -     fexofenadine (Allegra Allergy) 180 MG tablet; Take 1 tablet by mouth Daily.  Dispense: 30 tablet; Refill: 3    4. Other insomnia  -     zolpidem CR (AMBIEN CR) 12.5 MG CR tablet; Take 1 tablet by mouth At Night As Needed for Sleep.  Dispense: 30 tablet; Refill: 5    5. Generalized anxiety disorder  -     desvenlafaxine (Pristiq) 50 MG 24 hr tablet; Take 1 tablet by mouth Daily.  Dispense: 30 tablet; Refill: 5      BP not well controlled, will add atenolol 25 to prinzide and recheck in 2 months  Will recheck platelets and pt to follow up with hematology  Ok or ambien CR kimber vega reviewed. Sleep doing well  Mood stable, will refill pristiq  Mood ssable, not great.  Will continue prisitq  Ok for refill ambien for sleepsuzie reviewed

## 2021-06-23 LAB
ALBUMIN SERPL-MCNC: 4.6 G/DL (ref 3.5–5.2)
ALBUMIN/GLOB SERPL: 1.8 G/DL
ALP SERPL-CCNC: 79 U/L (ref 39–117)
ALT SERPL-CCNC: 15 U/L (ref 1–41)
AST SERPL-CCNC: 18 U/L (ref 1–40)
BASOPHILS # BLD AUTO: ABNORMAL 10*3/UL
BASOPHILS # BLD MANUAL: 0.07 10*3/MM3 (ref 0–0.2)
BASOPHILS NFR BLD MANUAL: 1 % (ref 0–1.5)
BILIRUB SERPL-MCNC: 0.2 MG/DL (ref 0–1.2)
BUN SERPL-MCNC: 23 MG/DL (ref 8–23)
BUN/CREAT SERPL: 15 (ref 7–25)
CALCIUM SERPL-MCNC: 9.5 MG/DL (ref 8.6–10.5)
CHLORIDE SERPL-SCNC: 103 MMOL/L (ref 98–107)
CHOLEST SERPL-MCNC: 218 MG/DL (ref 0–200)
CO2 SERPL-SCNC: 26.1 MMOL/L (ref 22–29)
CREAT SERPL-MCNC: 1.53 MG/DL (ref 0.76–1.27)
DIFFERENTIAL COMMENT: ABNORMAL
EOSINOPHIL # BLD AUTO: ABNORMAL 10*3/UL
EOSINOPHIL # BLD MANUAL: 0.15 10*3/MM3 (ref 0–0.4)
EOSINOPHIL NFR BLD AUTO: ABNORMAL %
EOSINOPHIL NFR BLD MANUAL: 2.1 % (ref 0.3–6.2)
ERYTHROCYTE [DISTWIDTH] IN BLOOD BY AUTOMATED COUNT: 14.5 % (ref 12.3–15.4)
GLOBULIN SER CALC-MCNC: 2.6 GM/DL
GLUCOSE SERPL-MCNC: 93 MG/DL (ref 65–99)
HCT VFR BLD AUTO: 31.7 % (ref 37.5–51)
HDLC SERPL-MCNC: 40 MG/DL (ref 40–60)
HGB BLD-MCNC: 10.4 G/DL (ref 13–17.7)
LDLC SERPL CALC-MCNC: 118 MG/DL (ref 0–100)
LYMPHOCYTES # BLD AUTO: ABNORMAL 10*3/UL
LYMPHOCYTES # BLD MANUAL: 1.82 10*3/MM3 (ref 0.7–3.1)
LYMPHOCYTES NFR BLD AUTO: ABNORMAL %
LYMPHOCYTES NFR BLD MANUAL: 23.7 % (ref 19.6–45.3)
MCH RBC QN AUTO: 28.2 PG (ref 26.6–33)
MCHC RBC AUTO-ENTMCNC: 32.8 G/DL (ref 31.5–35.7)
MCV RBC AUTO: 85.9 FL (ref 79–97)
MONOCYTES # BLD MANUAL: 0.23 10*3/MM3 (ref 0.1–0.9)
MONOCYTES NFR BLD AUTO: ABNORMAL %
MONOCYTES NFR BLD MANUAL: 3.1 % (ref 5–12)
NEUTROPHILS # BLD MANUAL: 5.08 10*3/MM3 (ref 1.7–7)
NEUTROPHILS NFR BLD AUTO: ABNORMAL %
NEUTROPHILS NFR BLD MANUAL: 69.1 % (ref 42.7–76)
PLATELET # BLD AUTO: 86 10*3/MM3 (ref 140–450)
PLATELET BLD QL SMEAR: ABNORMAL
POTASSIUM SERPL-SCNC: 5.2 MMOL/L (ref 3.5–5.2)
PROT SERPL-MCNC: 7.2 G/DL (ref 6–8.5)
RBC # BLD AUTO: 3.69 10*6/MM3 (ref 4.14–5.8)
RBC MORPH BLD: ABNORMAL
SODIUM SERPL-SCNC: 137 MMOL/L (ref 136–145)
TRIGL SERPL-MCNC: 341 MG/DL (ref 0–150)
VLDLC SERPL CALC-MCNC: 60 MG/DL (ref 5–40)
WBC # BLD AUTO: 7.35 10*3/MM3 (ref 3.4–10.8)

## 2021-08-11 DIAGNOSIS — I10 ESSENTIAL HYPERTENSION: ICD-10-CM

## 2021-08-11 RX ORDER — LISINOPRIL AND HYDROCHLOROTHIAZIDE 20; 12.5 MG/1; MG/1
TABLET ORAL
Qty: 30 TABLET | Refills: 0 | Status: SHIPPED | OUTPATIENT
Start: 2021-08-11 | End: 2021-09-24 | Stop reason: SDUPTHER

## 2021-08-23 ENCOUNTER — OFFICE VISIT (OUTPATIENT)
Dept: FAMILY MEDICINE CLINIC | Facility: CLINIC | Age: 61
End: 2021-08-23

## 2021-08-23 VITALS
DIASTOLIC BLOOD PRESSURE: 84 MMHG | HEIGHT: 69 IN | HEART RATE: 76 BPM | BODY MASS INDEX: 25.62 KG/M2 | TEMPERATURE: 97.8 F | WEIGHT: 173 LBS | SYSTOLIC BLOOD PRESSURE: 138 MMHG | RESPIRATION RATE: 18 BRPM

## 2021-08-23 DIAGNOSIS — I10 ESSENTIAL HYPERTENSION: ICD-10-CM

## 2021-08-23 DIAGNOSIS — Z00.00 MEDICARE ANNUAL WELLNESS VISIT, SUBSEQUENT: Primary | ICD-10-CM

## 2021-08-23 DIAGNOSIS — F41.1 GENERALIZED ANXIETY DISORDER: ICD-10-CM

## 2021-08-23 PROCEDURE — G0439 PPPS, SUBSEQ VISIT: HCPCS | Performed by: FAMILY MEDICINE

## 2021-08-23 RX ORDER — METAPROTERENOL SULFATE 10 MG
500 TABLET ORAL DAILY
COMMUNITY

## 2021-08-23 RX ORDER — ATENOLOL 50 MG/1
50 TABLET ORAL DAILY
Qty: 30 TABLET | Refills: 5 | Status: SHIPPED | OUTPATIENT
Start: 2021-08-23 | End: 2021-09-24 | Stop reason: SDUPTHER

## 2021-08-23 NOTE — PATIENT INSTRUCTIONS
Advance Care Planning and Advance Directives     You make decisions on a daily basis - decisions about where you want to live, your career, your home, your life. Perhaps one of the most important decisions you face is your choice for future medical care. Take time to talk with your family and your healthcare team and start planning today.  Advance Care Planning is a process that can help you:  · Understand possible future healthcare decisions in light of your own experiences  · Reflect on those decision in light of your goals and values  · Discuss your decisions with those closest to you and the healthcare professionals that care for you  · Make a plan by creating a document that reflects your wishes    Surrogate Decision Maker  In the event of a medical emergency, which has left you unable to communicate or to make your own decisions, you would need someone to make decisions for you.  It is important to discuss your preferences for medical treatment with this person while you are in good health.     Qualities of a surrogate decision maker:  • Willing to take on this role and responsibility  • Knows what you want for future medical care  • Willing to follow your wishes even if they don't agree with them  • Able to make difficult medical decisions under stressful circumstances    Advance Directives  These are legal documents you can create that will guide your healthcare team and decision maker(s) when needed. These documents can be stored in the electronic medical record.    · Living Will - a legal document to guide your care if you have a terminal condition or a serious illness and are unable to communicate. States vary by statute in document names/types, but most forms may include one or more of the following:        -  Directions regarding life-prolonging treatments        -  Directions regarding artificially provided nutrition/hydration        -  Choosing a healthcare decision maker        -  Direction  regarding organ/tissue donation    · Durable Power of  for Healthcare - this document names an -in-fact to make medical decisions for you, but it may also allow this person to make personal and financial decisions for you. Please seek the advice of an  if you need this type of document.    **Advance Directives are not required and no one may discriminate against you if you do not sign one.    Medical Orders  Many states allow specific forms/orders signed by your physician to record your wishes for medical treatment in your current state of health. This form, signed in personal communication with your physician, addresses resuscitation and other medical interventions that you may or may not want.      For more information or to schedule a time with a [facility] Advance Care Planning Facilitator contact: [email or web address] or call [phone] and someone will contact you directly.

## 2021-09-17 DIAGNOSIS — I10 ESSENTIAL HYPERTENSION: ICD-10-CM

## 2021-09-20 RX ORDER — ATENOLOL 25 MG/1
TABLET ORAL
Qty: 30 TABLET | Refills: 2 | OUTPATIENT
Start: 2021-09-20

## 2021-09-24 ENCOUNTER — OFFICE VISIT (OUTPATIENT)
Dept: FAMILY MEDICINE CLINIC | Facility: CLINIC | Age: 61
End: 2021-09-24

## 2021-09-24 VITALS
WEIGHT: 173 LBS | BODY MASS INDEX: 25.62 KG/M2 | TEMPERATURE: 97.3 F | RESPIRATION RATE: 18 BRPM | DIASTOLIC BLOOD PRESSURE: 84 MMHG | SYSTOLIC BLOOD PRESSURE: 128 MMHG | HEIGHT: 69 IN | HEART RATE: 74 BPM

## 2021-09-24 DIAGNOSIS — I10 ESSENTIAL HYPERTENSION: Primary | ICD-10-CM

## 2021-09-24 DIAGNOSIS — N18.31 STAGE 3A CHRONIC KIDNEY DISEASE (HCC): ICD-10-CM

## 2021-09-24 DIAGNOSIS — D64.9 ANEMIA, UNSPECIFIED TYPE: ICD-10-CM

## 2021-09-24 LAB
ALBUMIN SERPL-MCNC: 4.5 G/DL (ref 3.5–5.2)
ALBUMIN/GLOB SERPL: 2 G/DL
ALP SERPL-CCNC: 88 U/L (ref 39–117)
ALT SERPL-CCNC: 14 U/L (ref 1–41)
AST SERPL-CCNC: 17 U/L (ref 1–40)
BASOPHILS # BLD AUTO: 0.09 10*3/MM3 (ref 0–0.2)
BASOPHILS NFR BLD AUTO: 1.4 % (ref 0–1.5)
BILIRUB SERPL-MCNC: <0.2 MG/DL (ref 0–1.2)
BUN SERPL-MCNC: 40 MG/DL (ref 8–23)
BUN/CREAT SERPL: 21.7 (ref 7–25)
CALCIUM SERPL-MCNC: 9.5 MG/DL (ref 8.6–10.5)
CHLORIDE SERPL-SCNC: 102 MMOL/L (ref 98–107)
CO2 SERPL-SCNC: 24.6 MMOL/L (ref 22–29)
CREAT SERPL-MCNC: 1.84 MG/DL (ref 0.76–1.27)
EOSINOPHIL # BLD AUTO: 0.25 10*3/MM3 (ref 0–0.4)
EOSINOPHIL NFR BLD AUTO: 3.9 % (ref 0.3–6.2)
ERYTHROCYTE [DISTWIDTH] IN BLOOD BY AUTOMATED COUNT: 14.5 % (ref 12.3–15.4)
FOLATE SERPL-MCNC: 18.2 NG/ML (ref 4.78–24.2)
GLOBULIN SER CALC-MCNC: 2.3 GM/DL
GLUCOSE SERPL-MCNC: 111 MG/DL (ref 65–99)
HCT VFR BLD AUTO: 33.2 % (ref 37.5–51)
HGB BLD-MCNC: 10.6 G/DL (ref 13–17.7)
IMM GRANULOCYTES # BLD AUTO: 0.02 10*3/MM3 (ref 0–0.05)
IMM GRANULOCYTES NFR BLD AUTO: 0.3 % (ref 0–0.5)
IRON SATN MFR SERPL: 8 % (ref 20–50)
IRON SERPL-MCNC: 42 MCG/DL (ref 59–158)
LYMPHOCYTES # BLD AUTO: 2.17 10*3/MM3 (ref 0.7–3.1)
LYMPHOCYTES NFR BLD AUTO: 33.6 % (ref 19.6–45.3)
MCH RBC QN AUTO: 26.7 PG (ref 26.6–33)
MCHC RBC AUTO-ENTMCNC: 31.9 G/DL (ref 31.5–35.7)
MCV RBC AUTO: 83.6 FL (ref 79–97)
MONOCYTES # BLD AUTO: 0.53 10*3/MM3 (ref 0.1–0.9)
MONOCYTES NFR BLD AUTO: 8.2 % (ref 5–12)
NEUTROPHILS # BLD AUTO: 3.39 10*3/MM3 (ref 1.7–7)
NEUTROPHILS NFR BLD AUTO: 52.6 % (ref 42.7–76)
NRBC BLD AUTO-RTO: 0 /100 WBC (ref 0–0.2)
PLATELET # BLD AUTO: 65 10*3/MM3 (ref 140–450)
POTASSIUM SERPL-SCNC: 5.1 MMOL/L (ref 3.5–5.2)
PROT SERPL-MCNC: 6.8 G/DL (ref 6–8.5)
RBC # BLD AUTO: 3.97 10*6/MM3 (ref 4.14–5.8)
SODIUM SERPL-SCNC: 141 MMOL/L (ref 136–145)
TIBC SERPL-MCNC: 530 MCG/DL
UIBC SERPL-MCNC: 488 MCG/DL (ref 112–346)
VIT B12 SERPL-MCNC: 701 PG/ML (ref 211–946)
WBC # BLD AUTO: 6.45 10*3/MM3 (ref 3.4–10.8)

## 2021-09-24 PROCEDURE — 99214 OFFICE O/P EST MOD 30 MIN: CPT | Performed by: FAMILY MEDICINE

## 2021-09-24 RX ORDER — LISINOPRIL AND HYDROCHLOROTHIAZIDE 20; 12.5 MG/1; MG/1
1 TABLET ORAL DAILY
Qty: 30 TABLET | Refills: 5 | Status: SHIPPED | OUTPATIENT
Start: 2021-09-24 | End: 2022-01-25 | Stop reason: SDUPTHER

## 2021-09-24 RX ORDER — ATENOLOL 50 MG/1
50 TABLET ORAL DAILY
Qty: 30 TABLET | Refills: 5 | Status: SHIPPED | OUTPATIENT
Start: 2021-09-24 | End: 2022-01-25 | Stop reason: SDUPTHER

## 2021-09-24 NOTE — PROGRESS NOTES
Subjective   Huang Almonte is a 60 y.o. male.     History of Present Illness     Huang Almonte  is here for follow-up of hypertension of several years duration. He is not exercising and is not adherent to a low-salt diet. Patient does not check his blood pressure.  Cardiovascular risk factors: hypertension and male gender. He is compliant with meds.    He did have a drop in GFR last blood draw to 47  Has been in the 60-70 range prior to this  No known issues with his kidneys  He has no urinary issues at all    He also had some mild anemia  He is on [employer] and fish oil  We would like to recheck this    Review of Systems   Constitutional: Negative.    Psychiatric/Behavioral: Negative.        Objective   Physical Exam  Vitals and nursing note reviewed.   Constitutional:       General: He is not in acute distress.     Appearance: Normal appearance. He is well-developed.   Cardiovascular:      Rate and Rhythm: Normal rate and regular rhythm.      Heart sounds: Normal heart sounds.   Pulmonary:      Effort: Pulmonary effort is normal.      Breath sounds: Normal breath sounds.   Neurological:      Mental Status: He is alert and oriented to person, place, and time.   Psychiatric:         Mood and Affect: Mood normal.         Behavior: Behavior normal.         Thought Content: Thought content normal.         Judgment: Judgment normal.         Assessment/Plan   Diagnoses and all orders for this visit:    1. Essential hypertension (Primary)  -     lisinopril-hydrochlorothiazide (PRINZIDE,ZESTORETIC) 20-12.5 MG per tablet; Take 1 tablet by mouth Daily.  Dispense: 30 tablet; Refill: 5  -     atenolol (Tenormin) 50 MG tablet; Take 1 tablet by mouth Daily.  Dispense: 30 tablet; Refill: 5  -     CBC & Differential  -     Comprehensive Metabolic Panel    2. Stage 3a chronic kidney disease (CMS/HCC)  -     Comprehensive Metabolic Panel    3. Anemia, unspecified type  -     CBC & Differential  -     Iron Profile  -     Vitamin B12 &  Folate    the increase in atenolol to 50 mg has helped control his BP, will continue this  Will recheck kidney function and blood count.s  Plan to f/u pending labs

## 2022-01-03 DIAGNOSIS — F41.1 GENERALIZED ANXIETY DISORDER: ICD-10-CM

## 2022-01-03 DIAGNOSIS — G47.09 OTHER INSOMNIA: ICD-10-CM

## 2022-01-03 RX ORDER — DESVENLAFAXINE SUCCINATE 50 MG/1
TABLET, EXTENDED RELEASE ORAL
Qty: 30 TABLET | Refills: 0 | Status: SHIPPED | OUTPATIENT
Start: 2022-01-03 | End: 2022-07-25 | Stop reason: SDUPTHER

## 2022-01-03 RX ORDER — ZOLPIDEM TARTRATE 12.5 MG/1
TABLET, FILM COATED, EXTENDED RELEASE ORAL
Qty: 30 TABLET | Refills: 0 | Status: SHIPPED | OUTPATIENT
Start: 2022-01-03 | End: 2022-01-25 | Stop reason: SDUPTHER

## 2022-01-03 NOTE — TELEPHONE ENCOUNTER
Rx Refill Note  Requested Prescriptions     Pending Prescriptions Disp Refills   • zolpidem CR (AMBIEN CR) 12.5 MG CR tablet [Pharmacy Med Name: ZOLPIDEM TART ER 12.5 MG TAB] 30 tablet      Sig: TAKE ONE TABLET BY MOUTH EVERY NIGHT AT BEDTIME AS NEEDED FOR SLEEP   • desvenlafaxine (PRISTIQ) 50 MG 24 hr tablet [Pharmacy Med Name: DESVENLAFAXINE SUCCNT ER 50 MG] 30 tablet 5     Sig: TAKE ONE TABLET BY MOUTH DAILY      Last office visit with prescribing clinician: 9/24/2021      Next office visit with prescribing clinician: 1/25/2022            Ivanna Guillen MA  01/03/22, 09:11 EST

## 2022-01-03 NOTE — TELEPHONE ENCOUNTER
Caller: Huang Almonte    Relationship: Self    Best call back number: [phone]    Requested Prescriptions:   Requested Prescriptions     Signed Prescriptions Disp Refills   • zolpidem CR (AMBIEN CR) 12.5 MG CR tablet 30 tablet 0     Sig: TAKE ONE TABLET BY MOUTH EVERY NIGHT AT BEDTIME AS NEEDED FOR SLEEP     Authorizing Provider: GRAHAM LIZARRAGA   • desvenlafaxine (PRISTIQ) 50 MG 24 hr tablet 30 tablet 0     Sig: TAKE ONE TABLET BY MOUTH DAILY     Authorizing Provider: GRAHAM LIZARRAGA        [place] [facility] [place] [place] [address] [facility] [phone] [facility] [phone]      Additional details provided by patient:   Does the patient have less than a 3 day supply:  [x] Yes  [] No    Missy Henry Rep   01/03/22 11:48 EST

## 2022-01-25 ENCOUNTER — OFFICE VISIT (OUTPATIENT)
Dept: FAMILY MEDICINE CLINIC | Facility: CLINIC | Age: 62
End: 2022-01-25

## 2022-01-25 VITALS
HEART RATE: 76 BPM | DIASTOLIC BLOOD PRESSURE: 88 MMHG | SYSTOLIC BLOOD PRESSURE: 120 MMHG | TEMPERATURE: 98 F | BODY MASS INDEX: 25.03 KG/M2 | RESPIRATION RATE: 18 BRPM | HEIGHT: 69 IN | WEIGHT: 169 LBS

## 2022-01-25 DIAGNOSIS — F41.1 GENERALIZED ANXIETY DISORDER: Primary | ICD-10-CM

## 2022-01-25 DIAGNOSIS — I10 ESSENTIAL HYPERTENSION: ICD-10-CM

## 2022-01-25 DIAGNOSIS — E78.1 ESSENTIAL HYPERTRIGLYCERIDEMIA: ICD-10-CM

## 2022-01-25 DIAGNOSIS — Z12.5 SCREENING FOR PROSTATE CANCER: ICD-10-CM

## 2022-01-25 DIAGNOSIS — G47.09 OTHER INSOMNIA: ICD-10-CM

## 2022-01-25 DIAGNOSIS — D69.3 CHRONIC ITP (IDIOPATHIC THROMBOCYTOPENIA): ICD-10-CM

## 2022-01-25 DIAGNOSIS — E61.1 LOW IRON: ICD-10-CM

## 2022-01-25 PROBLEM — R09.82 POST-NASAL DISCHARGE: Status: RESOLVED | Noted: 2021-06-22 | Resolved: 2022-01-25

## 2022-01-25 PROCEDURE — 99214 OFFICE O/P EST MOD 30 MIN: CPT | Performed by: FAMILY MEDICINE

## 2022-01-25 RX ORDER — ZOLPIDEM TARTRATE 12.5 MG/1
12.5 TABLET, FILM COATED, EXTENDED RELEASE ORAL NIGHTLY PRN
Qty: 30 TABLET | Refills: 5 | Status: SHIPPED | OUTPATIENT
Start: 2022-01-25 | End: 2022-07-25 | Stop reason: SDUPTHER

## 2022-01-25 RX ORDER — ATENOLOL 50 MG/1
50 TABLET ORAL DAILY
Qty: 30 TABLET | Refills: 5 | Status: SHIPPED | OUTPATIENT
Start: 2022-01-25 | End: 2022-07-25 | Stop reason: SDUPTHER

## 2022-01-25 RX ORDER — LISINOPRIL AND HYDROCHLOROTHIAZIDE 20; 12.5 MG/1; MG/1
1 TABLET ORAL DAILY
Qty: 30 TABLET | Refills: 5 | Status: SHIPPED | OUTPATIENT
Start: 2022-01-25 | End: 2022-07-25 | Stop reason: SDUPTHER

## 2022-01-25 NOTE — PROGRESS NOTES
Subjective   Huang Almonte is a 61 y.o. male.     History of Present Illness     Huang Almonte  is here for follow-up of hypertension of several years duration. He is not exercising and is not adherent to a low-salt diet. Patient does not check his blood pressure.  Patient denies chest pain and palpitations. Cardiovascular risk factors: hypertension and male gender. He is compliant with meds.      Mood has been doing well, he does not think that he needs the medicine and would like to stop it  Does have times where he feels down    ambien does work for sleep  This continues to help him and he wakes up refreshed  Without the medicine he does not sleep    The following portions of the patient's history were reviewed and updated as appropriate: allergies, current medications, past family history, past medical history, past social history, past surgical history and problem list.    Review of Systems   Constitutional: Negative.    Respiratory: Negative.  Negative for shortness of breath.    Cardiovascular: Negative.  Negative for chest pain.   Psychiatric/Behavioral: Negative.  Negative for dysphoric mood. The patient is not nervous/anxious.        Objective   Physical Exam  Vitals and nursing note reviewed.   Constitutional:       General: He is not in acute distress.     Appearance: Normal appearance. He is well-developed.   Cardiovascular:      Rate and Rhythm: Normal rate and regular rhythm.      Heart sounds: Normal heart sounds.   Pulmonary:      Effort: Pulmonary effort is normal.      Breath sounds: Normal breath sounds.   Neurological:      Mental Status: He is alert and oriented to person, place, and time.   Psychiatric:         Mood and Affect: Mood normal.         Behavior: Behavior normal.         Thought Content: Thought content normal.         Judgment: Judgment normal.         Assessment/Plan   Diagnoses and all orders for this visit:    1. Generalized anxiety disorder (Primary)    2. Essential hypertension  -      lisinopril-hydrochlorothiazide (PRINZIDE,ZESTORETIC) 20-12.5 MG per tablet; Take 1 tablet by mouth Daily.  Dispense: 30 tablet; Refill: 5  -     atenolol (Tenormin) 50 MG tablet; Take 1 tablet by mouth Daily.  Dispense: 30 tablet; Refill: 5  -     CBC & Differential  -     Comprehensive Metabolic Panel  -     Uric Acid    3. Other insomnia  -     zolpidem CR (AMBIEN CR) 12.5 MG CR tablet; Take 1 tablet by mouth At Night As Needed for Sleep.  Dispense: 30 tablet; Refill: 5    4. Essential hypertriglyceridemia  -     Lipid Panel    5. Chronic ITP (idiopathic thrombocytopenia) (HCC)  -     CBC & Differential    6. Low iron  -     Iron Profile    7. Screening for prostate cancer  -     PSA Screen    BP doing well on prinzide and atenolol, no change and will check labs    Will stop pristiq and he will monitor mood.  He will call back in 2-4 weeks if mood is worse and he wants to get back on the medicine    Refilled YESY rodrigues    Will check labs and f/u pending results

## 2022-01-26 DIAGNOSIS — E78.00 HYPERCHOLESTEREMIA: ICD-10-CM

## 2022-01-26 DIAGNOSIS — D50.9 IRON DEFICIENCY ANEMIA, UNSPECIFIED IRON DEFICIENCY ANEMIA TYPE: Primary | ICD-10-CM

## 2022-01-26 LAB
ALBUMIN SERPL-MCNC: 4.3 G/DL (ref 3.5–5.2)
ALBUMIN/GLOB SERPL: 1.7 G/DL
ALP SERPL-CCNC: 84 U/L (ref 39–117)
ALT SERPL-CCNC: 10 U/L (ref 1–41)
AST SERPL-CCNC: 16 U/L (ref 1–40)
BASOPHILS # BLD AUTO: 0.07 10*3/MM3 (ref 0–0.2)
BASOPHILS NFR BLD AUTO: 1.1 % (ref 0–1.5)
BILIRUB SERPL-MCNC: 0.2 MG/DL (ref 0–1.2)
BUN SERPL-MCNC: 31 MG/DL (ref 8–23)
BUN/CREAT SERPL: 18.9 (ref 7–25)
CALCIUM SERPL-MCNC: 9.3 MG/DL (ref 8.6–10.5)
CHLORIDE SERPL-SCNC: 102 MMOL/L (ref 98–107)
CHOLEST SERPL-MCNC: 243 MG/DL (ref 0–200)
CO2 SERPL-SCNC: 26 MMOL/L (ref 22–29)
CREAT SERPL-MCNC: 1.64 MG/DL (ref 0.76–1.27)
EOSINOPHIL # BLD AUTO: 0.21 10*3/MM3 (ref 0–0.4)
EOSINOPHIL NFR BLD AUTO: 3.4 % (ref 0.3–6.2)
ERYTHROCYTE [DISTWIDTH] IN BLOOD BY AUTOMATED COUNT: 14.4 % (ref 12.3–15.4)
GLOBULIN SER CALC-MCNC: 2.6 GM/DL
GLUCOSE SERPL-MCNC: 111 MG/DL (ref 65–99)
HCT VFR BLD AUTO: 32.5 % (ref 37.5–51)
HDLC SERPL-MCNC: 34 MG/DL (ref 40–60)
HGB BLD-MCNC: 10.4 G/DL (ref 13–17.7)
IMM GRANULOCYTES # BLD AUTO: 0.01 10*3/MM3 (ref 0–0.05)
IMM GRANULOCYTES NFR BLD AUTO: 0.2 % (ref 0–0.5)
IRON SATN MFR SERPL: 4 % (ref 20–50)
IRON SERPL-MCNC: 22 MCG/DL (ref 59–158)
LDLC SERPL CALC-MCNC: 125 MG/DL (ref 0–100)
LYMPHOCYTES # BLD AUTO: 1.72 10*3/MM3 (ref 0.7–3.1)
LYMPHOCYTES NFR BLD AUTO: 28.2 % (ref 19.6–45.3)
MCH RBC QN AUTO: 26.9 PG (ref 26.6–33)
MCHC RBC AUTO-ENTMCNC: 32 G/DL (ref 31.5–35.7)
MCV RBC AUTO: 84.2 FL (ref 79–97)
MONOCYTES # BLD AUTO: 0.4 10*3/MM3 (ref 0.1–0.9)
MONOCYTES NFR BLD AUTO: 6.6 % (ref 5–12)
NEUTROPHILS # BLD AUTO: 3.69 10*3/MM3 (ref 1.7–7)
NEUTROPHILS NFR BLD AUTO: 60.5 % (ref 42.7–76)
NRBC BLD AUTO-RTO: 0 /100 WBC (ref 0–0.2)
PLATELET # BLD AUTO: 50 10*3/MM3 (ref 140–450)
POTASSIUM SERPL-SCNC: 5.3 MMOL/L (ref 3.5–5.2)
PROT SERPL-MCNC: 6.9 G/DL (ref 6–8.5)
PSA SERPL-MCNC: 3.96 NG/ML (ref 0–4)
RBC # BLD AUTO: 3.86 10*6/MM3 (ref 4.14–5.8)
SODIUM SERPL-SCNC: 137 MMOL/L (ref 136–145)
TIBC SERPL-MCNC: 540 MCG/DL
TRIGL SERPL-MCNC: 470 MG/DL (ref 0–150)
UIBC SERPL-MCNC: 518 MCG/DL (ref 112–346)
URATE SERPL-MCNC: 8.2 MG/DL (ref 3.4–7)
VLDLC SERPL CALC-MCNC: 84 MG/DL (ref 5–40)
WBC # BLD AUTO: 6.1 10*3/MM3 (ref 3.4–10.8)

## 2022-01-26 RX ORDER — CHOLECALCIFEROL (VITAMIN D3) 10(400)/ML
DROPS ORAL
Qty: 90 EACH | Refills: 1 | Status: SHIPPED | OUTPATIENT
Start: 2022-01-26 | End: 2022-07-25 | Stop reason: SDUPTHER

## 2022-01-26 RX ORDER — ATORVASTATIN CALCIUM 20 MG/1
20 TABLET, FILM COATED ORAL DAILY
Qty: 30 TABLET | Refills: 3 | Status: SHIPPED | OUTPATIENT
Start: 2022-01-26 | End: 2022-05-27

## 2022-01-29 DIAGNOSIS — F41.1 GENERALIZED ANXIETY DISORDER: ICD-10-CM

## 2022-01-31 RX ORDER — DESVENLAFAXINE SUCCINATE 50 MG/1
TABLET, EXTENDED RELEASE ORAL
Qty: 30 TABLET | Refills: 0 | OUTPATIENT
Start: 2022-01-31

## 2022-05-27 DIAGNOSIS — E78.00 HYPERCHOLESTEREMIA: ICD-10-CM

## 2022-05-27 RX ORDER — ATORVASTATIN CALCIUM 20 MG/1
TABLET, FILM COATED ORAL
Qty: 30 TABLET | Refills: 1 | Status: SHIPPED | OUTPATIENT
Start: 2022-05-27 | End: 2022-07-25 | Stop reason: SDUPTHER

## 2022-07-25 ENCOUNTER — OFFICE VISIT (OUTPATIENT)
Dept: FAMILY MEDICINE CLINIC | Facility: CLINIC | Age: 62
End: 2022-07-25

## 2022-07-25 ENCOUNTER — NURSE TRIAGE (OUTPATIENT)
Dept: CALL CENTER | Facility: HOSPITAL | Age: 62
End: 2022-07-25

## 2022-07-25 VITALS
HEIGHT: 69 IN | BODY MASS INDEX: 25.03 KG/M2 | RESPIRATION RATE: 16 BRPM | DIASTOLIC BLOOD PRESSURE: 84 MMHG | OXYGEN SATURATION: 99 % | WEIGHT: 169 LBS | SYSTOLIC BLOOD PRESSURE: 130 MMHG | TEMPERATURE: 97.3 F | HEART RATE: 65 BPM

## 2022-07-25 DIAGNOSIS — Z87.891 PERSONAL HISTORY OF TOBACCO USE, PRESENTING HAZARDS TO HEALTH: ICD-10-CM

## 2022-07-25 DIAGNOSIS — I10 ESSENTIAL HYPERTENSION: Primary | ICD-10-CM

## 2022-07-25 DIAGNOSIS — D50.9 IRON DEFICIENCY ANEMIA, UNSPECIFIED IRON DEFICIENCY ANEMIA TYPE: ICD-10-CM

## 2022-07-25 DIAGNOSIS — G47.09 OTHER INSOMNIA: ICD-10-CM

## 2022-07-25 DIAGNOSIS — E78.00 HYPERCHOLESTEREMIA: ICD-10-CM

## 2022-07-25 DIAGNOSIS — F41.1 GENERALIZED ANXIETY DISORDER: ICD-10-CM

## 2022-07-25 DIAGNOSIS — R42 DIZZINESS: ICD-10-CM

## 2022-07-25 LAB
ALBUMIN SERPL-MCNC: 4.4 G/DL (ref 3.5–5.2)
ALBUMIN/GLOB SERPL: 2 G/DL
ALP SERPL-CCNC: 76 U/L (ref 39–117)
ALT SERPL-CCNC: 15 U/L (ref 1–41)
AST SERPL-CCNC: 16 U/L (ref 1–40)
BASOPHILS # BLD AUTO: ABNORMAL 10*3/UL
BILIRUB SERPL-MCNC: 0.2 MG/DL (ref 0–1.2)
BUN SERPL-MCNC: 17 MG/DL (ref 8–23)
BUN/CREAT SERPL: 10.7 (ref 7–25)
CALCIUM SERPL-MCNC: 9.3 MG/DL (ref 8.6–10.5)
CHLORIDE SERPL-SCNC: 102 MMOL/L (ref 98–107)
CHOLEST SERPL-MCNC: 149 MG/DL (ref 0–200)
CO2 SERPL-SCNC: 26.8 MMOL/L (ref 22–29)
CREAT SERPL-MCNC: 1.59 MG/DL (ref 0.76–1.27)
DIFFERENTIAL COMMENT: NORMAL
EGFRCR SERPLBLD CKD-EPI 2021: 48.8 ML/MIN/1.73
EOSINOPHIL # BLD AUTO: ABNORMAL 10*3/UL
EOSINOPHIL # BLD MANUAL: 0.3 10*3/MM3 (ref 0–0.4)
EOSINOPHIL NFR BLD AUTO: ABNORMAL %
EOSINOPHIL NFR BLD MANUAL: 5.1 % (ref 0.3–6.2)
ERYTHROCYTE [DISTWIDTH] IN BLOOD BY AUTOMATED COUNT: 13 % (ref 12.3–15.4)
GLOBULIN SER CALC-MCNC: 2.2 GM/DL
GLUCOSE SERPL-MCNC: 136 MG/DL (ref 65–99)
HCT VFR BLD AUTO: 33.9 % (ref 37.5–51)
HDLC SERPL-MCNC: 38 MG/DL (ref 40–60)
HGB BLD-MCNC: 11.4 G/DL (ref 13–17.7)
IRON SATN MFR SERPL: 31 % (ref 20–50)
IRON SERPL-MCNC: 148 MCG/DL (ref 59–158)
LDLC SERPL CALC-MCNC: 66 MG/DL (ref 0–100)
LYMPHOCYTES # BLD AUTO: ABNORMAL 10*3/UL
LYMPHOCYTES # BLD MANUAL: 1.35 10*3/MM3 (ref 0.7–3.1)
LYMPHOCYTES NFR BLD AUTO: ABNORMAL %
LYMPHOCYTES NFR BLD MANUAL: 23.2 % (ref 19.6–45.3)
MCH RBC QN AUTO: 29.5 PG (ref 26.6–33)
MCHC RBC AUTO-ENTMCNC: 33.6 G/DL (ref 31.5–35.7)
MCV RBC AUTO: 87.8 FL (ref 79–97)
MONOCYTES # BLD MANUAL: 0.36 10*3/MM3 (ref 0.1–0.9)
MONOCYTES NFR BLD AUTO: ABNORMAL %
MONOCYTES NFR BLD MANUAL: 6.1 % (ref 5–12)
NEUTROPHILS # BLD MANUAL: 3.84 10*3/MM3 (ref 1.7–7)
NEUTROPHILS NFR BLD AUTO: ABNORMAL %
NEUTROPHILS NFR BLD MANUAL: 65.7 % (ref 42.7–76)
PLATELET # BLD AUTO: 43 10*3/MM3 (ref 140–450)
PLATELET BLD QL SMEAR: NORMAL
POTASSIUM SERPL-SCNC: 5.1 MMOL/L (ref 3.5–5.2)
PROT SERPL-MCNC: 6.6 G/DL (ref 6–8.5)
RBC # BLD AUTO: 3.86 10*6/MM3 (ref 4.14–5.8)
RBC MORPH BLD: NORMAL
SODIUM SERPL-SCNC: 140 MMOL/L (ref 136–145)
TIBC SERPL-MCNC: 475 MCG/DL
TRIGL SERPL-MCNC: 285 MG/DL (ref 0–150)
UIBC SERPL-MCNC: 327 MCG/DL (ref 112–346)
VLDLC SERPL CALC-MCNC: 45 MG/DL (ref 5–40)
WBC # BLD AUTO: 5.84 10*3/MM3 (ref 3.4–10.8)

## 2022-07-25 PROCEDURE — 99214 OFFICE O/P EST MOD 30 MIN: CPT | Performed by: FAMILY MEDICINE

## 2022-07-25 RX ORDER — ATENOLOL 50 MG/1
50 TABLET ORAL DAILY
Qty: 30 TABLET | Refills: 5 | Status: SHIPPED | OUTPATIENT
Start: 2022-07-25 | End: 2023-01-25 | Stop reason: SDUPTHER

## 2022-07-25 RX ORDER — DESVENLAFAXINE SUCCINATE 50 MG/1
50 TABLET, EXTENDED RELEASE ORAL DAILY
Qty: 30 TABLET | Refills: 5 | Status: SHIPPED | OUTPATIENT
Start: 2022-07-25 | End: 2022-08-24

## 2022-07-25 RX ORDER — ATORVASTATIN CALCIUM 20 MG/1
20 TABLET, FILM COATED ORAL DAILY
Qty: 30 TABLET | Refills: 5 | Status: SHIPPED | OUTPATIENT
Start: 2022-07-25 | End: 2023-01-24

## 2022-07-25 RX ORDER — LISINOPRIL AND HYDROCHLOROTHIAZIDE 20; 12.5 MG/1; MG/1
1 TABLET ORAL DAILY
Qty: 30 TABLET | Refills: 5 | Status: SHIPPED | OUTPATIENT
Start: 2022-07-25 | End: 2023-01-25 | Stop reason: SDUPTHER

## 2022-07-25 RX ORDER — CHOLECALCIFEROL (VITAMIN D3) 10(400)/ML
DROPS ORAL
Qty: 90 EACH | Refills: 1 | Status: SHIPPED | OUTPATIENT
Start: 2022-07-25 | End: 2023-04-03

## 2022-07-25 RX ORDER — ZOLPIDEM TARTRATE 12.5 MG/1
12.5 TABLET, FILM COATED, EXTENDED RELEASE ORAL NIGHTLY PRN
Qty: 30 TABLET | Refills: 5 | Status: SHIPPED | OUTPATIENT
Start: 2022-07-25 | End: 2023-01-25 | Stop reason: SDUPTHER

## 2022-07-25 NOTE — PROGRESS NOTES
Subjective   Huang Almonte is a 61 y.o. male.     History of Present Illness     He had one episode where he felt very dizzy around a month ago  Hard to lift his head up  His BP was low at home and he then drank a lot of water and this helped  Only a one time event      Huang Almonte  is here for follow-up of hypertension of several years duration. He is not exercising and is not adherent to a low-salt diet. Patient does not check his blood pressure.   He is compliant with meds.        Huang Almonte returns today for follow up of Hyperlipidemia  Huang indicates his exercise level as not at all.  Diet: trying to eat well  Patient is compliant with medications   Any side effects to medications:   chest pain No myalgia No memory change No  Pt is due for labs        The following portions of the patient's history were reviewed and updated as appropriate: allergies, current medications, past family history, past medical history, past social history, past surgical history and problem list.    Review of Systems   Constitutional: Negative.    Psychiatric/Behavioral: Negative.        Objective   Physical Exam  Vitals and nursing note reviewed.   Constitutional:       General: He is not in acute distress.     Appearance: Normal appearance. He is well-developed.   Cardiovascular:      Rate and Rhythm: Normal rate and regular rhythm.      Heart sounds: Normal heart sounds.   Pulmonary:      Effort: Pulmonary effort is normal.      Breath sounds: Normal breath sounds.   Neurological:      Mental Status: He is alert and oriented to person, place, and time.   Psychiatric:         Mood and Affect: Mood normal.         Behavior: Behavior normal.         Thought Content: Thought content normal.         Judgment: Judgment normal.         Assessment & Plan   Diagnoses and all orders for this visit:    1. Essential hypertension (Primary)  -     lisinopril-hydrochlorothiazide (PRINZIDE,ZESTORETIC) 20-12.5 MG per tablet; Take 1 tablet by mouth  Daily.  Dispense: 30 tablet; Refill: 5  -     atenolol (Tenormin) 50 MG tablet; Take 1 tablet by mouth Daily.  Dispense: 30 tablet; Refill: 5  -     CBC & Differential  -     Comprehensive Metabolic Panel    2. Hypercholesteremia  -     atorvastatin (LIPITOR) 20 MG tablet; Take 1 tablet by mouth Daily.  Dispense: 30 tablet; Refill: 5  -     Comprehensive Metabolic Panel  -     Lipid Panel    3. Generalized anxiety disorder  -     desvenlafaxine (PRISTIQ) 50 MG 24 hr tablet; Take 1 tablet by mouth Daily.  Dispense: 30 tablet; Refill: 5    4. Iron deficiency anemia, unspecified iron deficiency anemia type  -     CBC & Differential  -     Iron Profile  -     Ferrous Sulfate Dried ER (Slow Iron) 160 (50 Fe) MG tablet controlled-release; 1 PO QD  Dispense: 90 each; Refill: 1    5. Other insomnia  -     zolpidem CR (AMBIEN CR) 12.5 MG CR tablet; Take 1 tablet by mouth At Night As Needed for Sleep.  Dispense: 30 tablet; Refill: 5    6. Personal history of tobacco use, presenting hazards to health  -      CT Chest Low Dose Cancer Screening WO; Future    7. Dizziness    will continue the atenolol and prinzide as his BP is well controlled.  Will check labs and f/u pending results  Refilled lipitor and will check lipids  No change in pristiq nor iron, f/u pending iron serum levels  ambien refilled and YESY reviewed  Discussed dizziness and he will f/u if this happens again or be seen at ER or [facility]

## 2022-07-25 NOTE — TELEPHONE ENCOUNTER
"Secure Chat to [place], Lab value [address], called from [facility], Ehsan. Drawn today at  10:08, to Ingris Shelton. Ingris responded with this Message:  Thanks, chronic conditions for patient will contact  Reason for Disposition  • Lab or radiology calling with CRITICAL test results    Additional Information  • Negative: Lab calling with strep throat test results and triager can call in prescription  • Negative: Lab calling with urinalysis test results and triager can call in prescription  • Negative: Medication questions  • Negative: ED call to PCP  • Negative: Physician call to PCP  • Negative: Call about patient who is currently hospitalized  • Negative: [1] Prescription not at pharmacy AND [2] was prescribed today by PCP  • Negative: [1] Follow-up call from patient regarding patient's clinical status AND [2] information urgent  • Negative: [1] Caller requests to speak ONLY to PCP AND [2] URGENT question  • Negative: [1] Caller requests to speak to PCP now AND [2] won't tell us reason for call  (Exception: if 10 pm to 6 am, caller must first discuss reason for the call)  • Negative: Notification of hospital admission  • Negative: Notification of death  • Negative: Caller requesting lab results    Answer Assessment - Initial Assessment Questions  1. REASON FOR CALL or QUESTION: \"What is your reason for calling today?\" or \"How can I best  help you?\" or \"What question do you have that I can help answer?\"      [address]  2. CALLER: Document the source of call. (e.g., laboratory, patient).      Ehsan Lang from [facility]    Protocols used: PCP CALL - NO TRIAGE-ADULT-      "

## 2022-07-28 ENCOUNTER — HOSPITAL ENCOUNTER (OUTPATIENT)
Dept: CT IMAGING | Facility: HOSPITAL | Age: 62
Discharge: HOME | End: 2022-07-28
Admitting: FAMILY MEDICINE

## 2022-07-28 DIAGNOSIS — Z87.891 PERSONAL HISTORY OF TOBACCO USE, PRESENTING HAZARDS TO HEALTH: ICD-10-CM

## 2022-07-28 PROCEDURE — 71271 CT THORAX LUNG CANCER SCR C-: CPT

## 2022-08-24 ENCOUNTER — OFFICE VISIT (OUTPATIENT)
Dept: FAMILY MEDICINE CLINIC | Facility: CLINIC | Age: 62
End: 2022-08-24

## 2022-08-24 VITALS
HEIGHT: 69 IN | BODY MASS INDEX: 25.03 KG/M2 | RESPIRATION RATE: 18 BRPM | HEART RATE: 78 BPM | DIASTOLIC BLOOD PRESSURE: 70 MMHG | SYSTOLIC BLOOD PRESSURE: 124 MMHG | TEMPERATURE: 97.5 F | WEIGHT: 169 LBS

## 2022-08-24 DIAGNOSIS — Z00.00 MEDICARE ANNUAL WELLNESS VISIT, SUBSEQUENT: Primary | ICD-10-CM

## 2022-08-24 PROCEDURE — 1170F FXNL STATUS ASSESSED: CPT | Performed by: FAMILY MEDICINE

## 2022-08-24 PROCEDURE — G0439 PPPS, SUBSEQ VISIT: HCPCS | Performed by: FAMILY MEDICINE

## 2022-08-24 PROCEDURE — 99396 PREV VISIT EST AGE 40-64: CPT | Performed by: FAMILY MEDICINE

## 2022-08-24 PROCEDURE — 1125F AMNT PAIN NOTED PAIN PRSNT: CPT | Performed by: FAMILY MEDICINE

## 2022-08-24 PROCEDURE — 1159F MED LIST DOCD IN RCRD: CPT | Performed by: FAMILY MEDICINE

## 2022-08-24 NOTE — PROGRESS NOTES
The ABCs of the Annual Wellness Visit  Subsequent Medicare Wellness Visit    Chief Complaint   Patient presents with   • Medicare Wellness-subsequent      Subjective    History of Present Illness:  Huang Almonte is a 61 y.o. male who presents for a Subsequent Medicare Wellness Visit.    The following portions of the patient's history were reviewed and   updated as appropriate: allergies, current medications, past family history, past medical history, past social history, past surgical history and problem list.    Compared to one year ago, the patient feels his physical   health is the same.    Compared to one year ago, the patient feels his mental   health is the same.    Recent Hospitalizations:  He was not admitted to the hospital during the last year.       Current Medical Providers:  Patient Care Team:  Jeremías Brar MD as PCP - General    Outpatient Medications Prior to Visit   Medication Sig Dispense Refill   • aspirin 81 MG EC tablet Take 1 tablet by mouth Daily. 30 tablet 11   • atenolol (Tenormin) 50 MG tablet Take 1 tablet by mouth Daily. 30 tablet 5   • atorvastatin (LIPITOR) 20 MG tablet Take 1 tablet by mouth Daily. 30 tablet 5   • Ferrous Sulfate Dried ER (Slow Iron) 160 (50 Fe) MG tablet controlled-release 1 PO QD 90 each 1   • gabapentin (NEURONTIN) 400 MG capsule Gabapentin 400 MG Oral Capsule; Patient Sig: Gabapentin 400 MG Oral Capsule ; 90; 0; 07-May-2015; Active     • lisinopril-hydrochlorothiazide (PRINZIDE,ZESTORETIC) 20-12.5 MG per tablet Take 1 tablet by mouth Daily. 30 tablet 5   • Omega-3 Fatty Acids (Omega-3 Fish Oil) 500 MG capsule Take 500 mg by mouth Daily.     • oxyCODONE-acetaminophen (PERCOCET) 7.5-325 MG per tablet      • zolpidem CR (AMBIEN CR) 12.5 MG CR tablet Take 1 tablet by mouth At Night As Needed for Sleep. 30 tablet 5   • desvenlafaxine (PRISTIQ) 50 MG 24 hr tablet Take 1 tablet by mouth Daily. 30 tablet 5     No facility-administered medications prior to visit.  "      Opioid medication/s are on active medication list.  and I have evaluated his active treatment plan and pain score trends (see table).  Vitals:    08/24/22 1111   PainSc:   4     I have reviewed the chart for potential of high risk medication and harmful drug interactions in the elderly.                Patient Active Problem List   Diagnosis   • Back pain   • Generalized anxiety disorder   • Essential hypertriglyceridemia   • Current every day smoker   • Insomnia   • Chronic ITP (idiopathic thrombocytopenia) (HCC)   • Hypogonadism in male   • Essential hypertension     Advance Care Planning  Advance Directive is not on file.  ACP discussion was held with the patient during this visit. Patient does not have an advance directive, information provided.    Review of Systems   Constitutional: Negative.    Respiratory: Negative.  Negative for shortness of breath.    Cardiovascular: Negative.  Negative for chest pain.   Gastrointestinal: Negative.  Negative for constipation and diarrhea.   Psychiatric/Behavioral: Negative.         Objective    Vitals:    08/24/22 1111   BP: 124/70   Pulse: 78   Resp: 18   Temp: 97.5 °F (36.4 °C)   Weight: 76.7 kg (169 lb)   Height: 175.3 cm (69\")   PainSc:   4     Estimated body mass index is 24.96 kg/m² as calculated from the following:    Height as of this encounter: 175.3 cm (69\").    Weight as of this encounter: 76.7 kg (169 lb).    BMI is within normal parameters. No other follow-up for BMI required.      Does the patient have evidence of cognitive impairment? No    Physical Exam  Vitals and nursing note reviewed.   Constitutional:       General: He is not in acute distress.     Appearance: Normal appearance. He is well-developed.   HENT:      Head: Normocephalic and atraumatic.      Right Ear: External ear normal.      Left Ear: External ear normal.      Nose: Nose normal.   Eyes:      Extraocular Movements: Extraocular movements intact.      Conjunctiva/sclera: Conjunctivae " normal.   Neck:      Thyroid: No thyromegaly.   Cardiovascular:      Rate and Rhythm: Normal rate and regular rhythm.      Heart sounds: Normal heart sounds. No murmur heard.  Pulmonary:      Effort: Pulmonary effort is normal. No respiratory distress.      Breath sounds: Normal breath sounds. No wheezing or rhonchi.   Abdominal:      General: Bowel sounds are normal. There is no distension.      Palpations: Abdomen is soft.      Tenderness: There is no abdominal tenderness.   Musculoskeletal:      Cervical back: Normal range of motion and neck supple.   Lymphadenopathy:      Cervical: No cervical adenopathy.   Skin:     General: Skin is warm and dry.   Neurological:      Mental Status: He is alert and oriented to person, place, and time.   Psychiatric:         Mood and Affect: Mood normal.         Behavior: Behavior normal.         Thought Content: Thought content normal.         Judgment: Judgment normal.       Lab Results   Component Value Date    CHLPL 149 07/25/2022    TRIG 285 (H) 07/25/2022    HDL 38 (L) 07/25/2022    LDL 66 07/25/2022    VLDL 45 (H) 07/25/2022            HEALTH RISK ASSESSMENT    Smoking Status:  Social History     Tobacco Use   Smoking Status Former Smoker   • Packs/day: 1.00   • Years: 40.00   • Pack years: 40.00   • Types: Cigarettes   • Quit date: 02/2019   • Years since quitting: 3.5   Smokeless Tobacco Never Used     Alcohol Consumption:  Social History     Substance and Sexual Activity   Alcohol Use Yes    Comment: trying to quit     Fall Risk Screen:    [facility] Fall Risk Assessment has not been completed.    Depression Screening:  PHQ-2/PHQ-9 Depression Screening 8/24/2022   Retired PHQ-9 Total Score -   Retired Total Score -   Little Interest or Pleasure in Doing Things 1-->several days   Feeling Down, Depressed or Hopeless 1-->several days   Trouble Falling or Staying Asleep, or Sleeping Too Much 1-->several days   Feeling Tired or Having Little Energy 1-->several days   Poor  Appetite or Overeating 0-->not at all   Feeling Bad about Yourself - or that You are a Failure or Have Let Yourself or Your Family Down 0-->not at all   Trouble Concentrating on Things, Such as Reading the Newspaper or Watching Television 0-->not at all   Moving or Speaking So Slowly that Other People Could Have Noticed? Or the Opposite - Being So Fidgety 0-->not at all   Thoughts that You Would be Better Off Dead or of Hurting Yourself in Some Way 0-->not at all   PHQ-9: Brief Depression Severity Measure Score 4   If You Checked Off Any Problems, How Difficult Have These Problems Made It For You to Do Your Work, Take Care of Things at Home, or Get Along with Other People? not difficult at all       Health Habits and Functional and Cognitive Screening:  Functional & Cognitive Status 8/24/2022   Do you have difficulty preparing food and eating? No   Do you have difficulty bathing yourself, getting dressed or grooming yourself? No   Do you have difficulty using the toilet? No   Do you have difficulty moving around from place to place? Yes   Do you have trouble with steps or getting out of a bed or a chair? Yes   Current Diet Frequent Junk Food   Dental Exam Up to date   Eye Exam Not up to date   Exercise (times per week) 0 times per week   Current Exercises Include No Regular Exercise   Current Exercise Activities Include -   Do you need help using the phone?  No   Are you deaf or do you have serious difficulty hearing?  No   Do you need help with transportation? No   Do you need help shopping? No   Do you need help preparing meals?  No   Do you need help with housework?  No   Do you need help with laundry? No   Do you need help taking your medications? No   Do you need help managing money? No   Do you ever drive or ride in a car without wearing a seat belt? No   Have you felt unusual stress, anger or loneliness in the last month? -   Who do you live with? Alone   If you need help, do you have trouble finding someone  available to you? No   Have you been bothered in the last four weeks by sexual problems? No   Do you have difficulty concentrating, remembering or making decisions? No       Age-appropriate Screening Schedule:  Refer to the list below for future screening recommendations based on patient's age, sex and/or medical conditions. Orders for these recommended tests are listed in the plan section. The patient has been provided with a written plan.    Health Maintenance   Topic Date Due   • ZOSTER VACCINE (1 of 2) Never done   • INFLUENZA VACCINE  10/01/2022   • LIPID PANEL  07/25/2023   • TDAP/TD VACCINES (2 - Td or Tdap) 10/16/2028              Assessment & Plan   CMS Preventative Services Quick Reference  Risk Factors Identified During Encounter  Chronic Pain   Depression/Dysphoria  Fall Risk-High or Moderate  The above risks/problems have been discussed with the patient.  Follow up actions/plans if indicated are seen below in the Assessment/Plan Section.  Pertinent information has been shared with the patient in the After Visit Summary.    Diagnoses and all orders for this visit:    1. Medicare annual wellness visit, subsequent (Primary)    medicare wellness completed.  Pt consulted about well adult care including above issues.  Diet and exercise stress dand he does have a dog at home to help with activity    Follow Up:   No follow-ups on file.     An After Visit Summary and PPPS were made available to the patient.

## 2022-11-28 DIAGNOSIS — I10 ESSENTIAL HYPERTENSION: ICD-10-CM

## 2022-11-28 NOTE — TELEPHONE ENCOUNTER
Caller: Huang Almonte    Relationship: Self    Best call back number: [phone]  Requested Prescriptions:   Requested Prescriptions     Pending Prescriptions Disp Refills   • lisinopril-hydrochlorothiazide (PRINZIDE,ZESTORETIC) 20-12.5 MG per tablet 30 tablet 5     Sig: Take 1 tablet by mouth Daily.        Pharmacy where request should be sent: [facility] [id number] [address] [phone] [facility] [phone]      Additional details provided by patient: PATIENT CALLED FOR REFILL STATUS    Does the patient have less than a 3 day supply:  [x] Yes  [] No    Missy Garsia Rep   11/28/22 09:38 EST

## 2022-11-29 RX ORDER — LISINOPRIL AND HYDROCHLOROTHIAZIDE 20; 12.5 MG/1; MG/1
1 TABLET ORAL DAILY
Qty: 30 TABLET | Refills: 5 | OUTPATIENT
Start: 2022-11-29

## 2023-01-24 DIAGNOSIS — E78.00 HYPERCHOLESTEREMIA: ICD-10-CM

## 2023-01-24 RX ORDER — ATORVASTATIN CALCIUM 20 MG/1
TABLET, FILM COATED ORAL
Qty: 30 TABLET | Refills: 5 | Status: SHIPPED | OUTPATIENT
Start: 2023-01-24 | End: 2023-01-25 | Stop reason: SDUPTHER

## 2023-01-25 ENCOUNTER — DOCUMENTATION (OUTPATIENT)
Dept: FAMILY MEDICINE CLINIC | Facility: CLINIC | Age: 63
End: 2023-01-25
Payer: MEDICARE

## 2023-01-25 ENCOUNTER — OFFICE VISIT (OUTPATIENT)
Dept: FAMILY MEDICINE CLINIC | Facility: CLINIC | Age: 63
End: 2023-01-25
Payer: MEDICARE

## 2023-01-25 VITALS
RESPIRATION RATE: 18 BRPM | HEART RATE: 74 BPM | DIASTOLIC BLOOD PRESSURE: 70 MMHG | HEIGHT: 69 IN | WEIGHT: 171 LBS | BODY MASS INDEX: 25.33 KG/M2 | OXYGEN SATURATION: 97 % | TEMPERATURE: 97.8 F | SYSTOLIC BLOOD PRESSURE: 120 MMHG

## 2023-01-25 DIAGNOSIS — I10 ESSENTIAL HYPERTENSION: ICD-10-CM

## 2023-01-25 DIAGNOSIS — E78.00 HYPERCHOLESTEREMIA: ICD-10-CM

## 2023-01-25 DIAGNOSIS — G47.09 OTHER INSOMNIA: ICD-10-CM

## 2023-01-25 DIAGNOSIS — D50.9 IRON DEFICIENCY ANEMIA, UNSPECIFIED IRON DEFICIENCY ANEMIA TYPE: Primary | ICD-10-CM

## 2023-01-25 DIAGNOSIS — Z12.11 SCREEN FOR COLON CANCER: ICD-10-CM

## 2023-01-25 DIAGNOSIS — Z12.5 SCREENING FOR PROSTATE CANCER: ICD-10-CM

## 2023-01-25 PROCEDURE — 99214 OFFICE O/P EST MOD 30 MIN: CPT | Performed by: FAMILY MEDICINE

## 2023-01-25 RX ORDER — LISINOPRIL AND HYDROCHLOROTHIAZIDE 20; 12.5 MG/1; MG/1
1 TABLET ORAL DAILY
Qty: 30 TABLET | Refills: 5 | Status: SHIPPED | OUTPATIENT
Start: 2023-01-25

## 2023-01-25 RX ORDER — ZOLPIDEM TARTRATE 12.5 MG/1
12.5 TABLET, FILM COATED, EXTENDED RELEASE ORAL NIGHTLY PRN
Qty: 30 TABLET | Refills: 5 | Status: SHIPPED | OUTPATIENT
Start: 2023-01-25

## 2023-01-25 RX ORDER — ATORVASTATIN CALCIUM 20 MG/1
20 TABLET, FILM COATED ORAL DAILY
Qty: 30 TABLET | Refills: 5 | Status: SHIPPED | OUTPATIENT
Start: 2023-01-25

## 2023-01-25 RX ORDER — ATENOLOL 50 MG/1
50 TABLET ORAL DAILY
Qty: 30 TABLET | Refills: 5 | Status: SHIPPED | OUTPATIENT
Start: 2023-01-25

## 2023-01-25 NOTE — PROGRESS NOTES
Subjective   Huang Almonte is a 62 y.o. male.     History of Present Illness     Huang Almonte  is here for follow-up of hypertension of several years duration. He is not exercising and is not adherent to a low-salt diet. Patient does not check his blood pressure.   He is compliant with meds.        Huang Almonte returns today for follow up of Hyperlipidemia  Huang indicates his exercise level as irregularly.  Diet: trying to eat better  Patient is compliant with medications   Any side effects to medications:   chest pain No myalgia No memory change No  Pt is due for labs    He had positive hemoccult test at home  Last scope was 2016    Sleeping well with ambien  No SE with this medicine    The following portions of the patient's history were reviewed and updated as appropriate: allergies, current medications, past family history, past medical history, past social history, past surgical history and problem list.    Review of Systems   Constitutional: Negative.    Psychiatric/Behavioral: Negative.        Objective   Physical Exam  Vitals and nursing note reviewed.   Constitutional:       General: He is not in acute distress.     Appearance: Normal appearance. He is well-developed.   Cardiovascular:      Rate and Rhythm: Normal rate and regular rhythm.      Heart sounds: Normal heart sounds.   Pulmonary:      Effort: Pulmonary effort is normal.      Breath sounds: Normal breath sounds.   Abdominal:      General: Abdomen is flat. Bowel sounds are normal. There is no distension.      Palpations: Abdomen is soft.      Tenderness: There is no abdominal tenderness. There is no guarding or rebound.   Neurological:      Mental Status: He is alert and oriented to person, place, and time.   Psychiatric:         Mood and Affect: Mood normal.         Behavior: Behavior normal.         Thought Content: Thought content normal.         Judgment: Judgment normal.         Assessment & Plan   Diagnoses and all orders for this visit:    1.  Iron deficiency anemia, unspecified iron deficiency anemia type (Primary)  -     CBC & Differential  -     Iron Profile    2. Essential hypertension  -     lisinopril-hydrochlorothiazide (PRINZIDE,ZESTORETIC) 20-12.5 MG per tablet; Take 1 tablet by mouth Daily.  Dispense: 30 tablet; Refill: 5  -     atenolol (Tenormin) 50 MG tablet; Take 1 tablet by mouth Daily.  Dispense: 30 tablet; Refill: 5  -     CBC & Differential  -     Comprehensive Metabolic Panel    3. Hypercholesteremia  -     atorvastatin (LIPITOR) 20 MG tablet; Take 1 tablet by mouth Daily.  Dispense: 30 tablet; Refill: 5  -     Comprehensive Metabolic Panel  -     Lipid Panel    4. Other insomnia  -     zolpidem CR (AMBIEN CR) 12.5 MG CR tablet; Take 1 tablet by mouth At Night As Needed for Sleep.  Dispense: 30 tablet; Refill: 5    5. Screening for prostate cancer  -     PSA Screen    will recheck blood counts.  He had + hemoccult test with normal colonoscopy several years ago.   At this time I will order colonoscopy based on + home hemoccult.    Continue prinzide and atenlol for BP and check labs  No change in lipitor but will check lipids  PSA to be drawn  Ok ambien for sleep.  YESY reviewed

## 2023-01-26 ENCOUNTER — TELEPHONE (OUTPATIENT)
Dept: FAMILY MEDICINE CLINIC | Facility: CLINIC | Age: 63
End: 2023-01-26
Payer: MEDICARE

## 2023-01-26 DIAGNOSIS — D69.3 CHRONIC ITP (IDIOPATHIC THROMBOCYTOPENIA): Primary | ICD-10-CM

## 2023-01-26 LAB
ALBUMIN SERPL-MCNC: 4.6 G/DL (ref 3.5–5.2)
ALBUMIN/GLOB SERPL: 2 G/DL
ALP SERPL-CCNC: 87 U/L (ref 39–117)
ALT SERPL-CCNC: 23 U/L (ref 1–41)
AST SERPL-CCNC: 22 U/L (ref 1–40)
BASOPHILS # BLD AUTO: ABNORMAL 10*3/UL
BASOPHILS # BLD MANUAL: 0.08 10*3/MM3 (ref 0–0.2)
BASOPHILS NFR BLD MANUAL: 1 % (ref 0–1.5)
BILIRUB SERPL-MCNC: 0.2 MG/DL (ref 0–1.2)
BUN SERPL-MCNC: 31 MG/DL (ref 8–23)
BUN/CREAT SERPL: 16.2 (ref 7–25)
CALCIUM SERPL-MCNC: 9.3 MG/DL (ref 8.6–10.5)
CHLORIDE SERPL-SCNC: 104 MMOL/L (ref 98–107)
CHOLEST SERPL-MCNC: 207 MG/DL (ref 0–200)
CO2 SERPL-SCNC: 27.7 MMOL/L (ref 22–29)
CREAT SERPL-MCNC: 1.91 MG/DL (ref 0.76–1.27)
DIFFERENTIAL COMMENT: ABNORMAL
EGFRCR SERPLBLD CKD-EPI 2021: 38.9 ML/MIN/1.73
EOSINOPHIL # BLD AUTO: ABNORMAL 10*3/UL
EOSINOPHIL # BLD MANUAL: 0.32 10*3/MM3 (ref 0–0.4)
EOSINOPHIL NFR BLD AUTO: ABNORMAL %
EOSINOPHIL NFR BLD MANUAL: 4.1 % (ref 0.3–6.2)
ERYTHROCYTE [DISTWIDTH] IN BLOOD BY AUTOMATED COUNT: 12.4 % (ref 12.3–15.4)
GLOBULIN SER CALC-MCNC: 2.3 GM/DL
GLUCOSE SERPL-MCNC: 109 MG/DL (ref 65–99)
HCT VFR BLD AUTO: 34.6 % (ref 37.5–51)
HDLC SERPL-MCNC: 42 MG/DL (ref 40–60)
HGB BLD-MCNC: 11.5 G/DL (ref 13–17.7)
IRON SATN MFR SERPL: 20 % (ref 20–50)
IRON SERPL-MCNC: 97 MCG/DL (ref 59–158)
LDLC SERPL CALC-MCNC: 101 MG/DL (ref 0–100)
LYMPHOCYTES # BLD AUTO: ABNORMAL 10*3/UL
LYMPHOCYTES # BLD MANUAL: 2.23 10*3/MM3 (ref 0.7–3.1)
LYMPHOCYTES NFR BLD AUTO: ABNORMAL %
LYMPHOCYTES NFR BLD MANUAL: 28.9 % (ref 19.6–45.3)
MCH RBC QN AUTO: 30.7 PG (ref 26.6–33)
MCHC RBC AUTO-ENTMCNC: 33.2 G/DL (ref 31.5–35.7)
MCV RBC AUTO: 92.5 FL (ref 79–97)
MONOCYTES # BLD MANUAL: 0.32 10*3/MM3 (ref 0.1–0.9)
MONOCYTES NFR BLD AUTO: ABNORMAL %
MONOCYTES NFR BLD MANUAL: 4.1 % (ref 5–12)
NEUTROPHILS # BLD MANUAL: 4.77 10*3/MM3 (ref 1.7–7)
NEUTROPHILS NFR BLD AUTO: ABNORMAL %
NEUTROPHILS NFR BLD MANUAL: 61.9 % (ref 42.7–76)
PLATELET # BLD AUTO: 34 10*3/MM3 (ref 140–450)
PLATELET BLD QL SMEAR: ABNORMAL
POTASSIUM SERPL-SCNC: 5.1 MMOL/L (ref 3.5–5.2)
PROT SERPL-MCNC: 6.9 G/DL (ref 6–8.5)
PSA SERPL-MCNC: 4.21 NG/ML (ref 0–4)
RBC # BLD AUTO: 3.74 10*6/MM3 (ref 4.14–5.8)
RBC MORPH BLD: ABNORMAL
SODIUM SERPL-SCNC: 139 MMOL/L (ref 136–145)
TIBC SERPL-MCNC: 492 MCG/DL
TRIGL SERPL-MCNC: 379 MG/DL (ref 0–150)
UIBC SERPL-MCNC: 395 MCG/DL (ref 112–346)
VLDLC SERPL CALC-MCNC: 64 MG/DL (ref 5–40)
WBC # BLD AUTO: 7.71 10*3/MM3 (ref 3.4–10.8)

## 2023-01-26 NOTE — PROGRESS NOTES
Received call from lab. Platelet count 34,000. Reviewed previous results and last platelet count was 43,000. Patient with history of ITP. Will review full results in the morning. Reviewed office visit note from today and no reported bleeding or concerns except for previous Hemoccult positive stool and and work-up is being instituted. I will contact patient to be sure no recent significant bruising or bleeding.    Discussed with patient and no abnormal bleeding or bruising. He is no longer seeing hematology. Will inform Dr Brar of results in the am.

## 2023-01-27 ENCOUNTER — TELEPHONE (OUTPATIENT)
Dept: FAMILY MEDICINE CLINIC | Facility: CLINIC | Age: 63
End: 2023-01-27
Payer: MEDICARE

## 2023-01-27 NOTE — TELEPHONE ENCOUNTER
Please let pt know that since he had a positive home hemoccult test that I am going to refer him for repeat colonoscopy.    We had discussed this at his appointment bu I was not sure but based on further research, repeating the colonoscopy is the safest thing to do.

## 2023-02-24 ENCOUNTER — LAB (OUTPATIENT)
Dept: LAB | Facility: HOSPITAL | Age: 63
End: 2023-02-24
Payer: MEDICARE

## 2023-02-24 ENCOUNTER — TELEPHONE (OUTPATIENT)
Dept: ONCOLOGY | Facility: CLINIC | Age: 63
End: 2023-02-24

## 2023-02-24 ENCOUNTER — CONSULT (OUTPATIENT)
Dept: ONCOLOGY | Facility: CLINIC | Age: 63
End: 2023-02-24
Payer: MEDICARE

## 2023-02-24 VITALS
SYSTOLIC BLOOD PRESSURE: 156 MMHG | TEMPERATURE: 97.3 F | HEART RATE: 70 BPM | RESPIRATION RATE: 18 BRPM | DIASTOLIC BLOOD PRESSURE: 76 MMHG | BODY MASS INDEX: 24.56 KG/M2 | WEIGHT: 165.8 LBS | OXYGEN SATURATION: 99 % | HEIGHT: 69 IN

## 2023-02-24 DIAGNOSIS — D69.3 CHRONIC ITP (IDIOPATHIC THROMBOCYTOPENIA): ICD-10-CM

## 2023-02-24 DIAGNOSIS — D69.3 CHRONIC ITP (IDIOPATHIC THROMBOCYTOPENIA): Primary | ICD-10-CM

## 2023-02-24 LAB
ALBUMIN SERPL-MCNC: 4.7 G/DL (ref 3.5–5.2)
ALBUMIN/GLOB SERPL: 1.7 G/DL
ALP SERPL-CCNC: 89 U/L (ref 39–117)
ALT SERPL W P-5'-P-CCNC: 20 U/L (ref 1–41)
ANION GAP SERPL CALCULATED.3IONS-SCNC: 11 MMOL/L (ref 5–15)
AST SERPL-CCNC: 23 U/L (ref 1–40)
BASOPHILS # BLD AUTO: 0.08 10*3/MM3 (ref 0–0.2)
BASOPHILS NFR BLD AUTO: 1.1 % (ref 0–1.5)
BILIRUB SERPL-MCNC: 0.5 MG/DL (ref 0–1.2)
BUN SERPL-MCNC: 30 MG/DL (ref 8–23)
BUN/CREAT SERPL: 20.5 (ref 7–25)
CALCIUM SPEC-SCNC: 9.5 MG/DL (ref 8.6–10.5)
CHLORIDE SERPL-SCNC: 100 MMOL/L (ref 98–107)
CO2 SERPL-SCNC: 27 MMOL/L (ref 22–29)
CREAT SERPL-MCNC: 1.46 MG/DL (ref 0.76–1.27)
DEPRECATED RDW RBC AUTO: 41.8 FL (ref 37–54)
EGFRCR SERPLBLD CKD-EPI 2021: 53.7 ML/MIN/1.73
EOSINOPHIL # BLD AUTO: 0.15 10*3/MM3 (ref 0–0.4)
EOSINOPHIL NFR BLD AUTO: 2.1 % (ref 0.3–6.2)
ERYTHROCYTE [DISTWIDTH] IN BLOOD BY AUTOMATED COUNT: 12.8 % (ref 12.3–15.4)
GLOBULIN UR ELPH-MCNC: 2.7 GM/DL
GLUCOSE SERPL-MCNC: 108 MG/DL (ref 65–99)
HCT VFR BLD AUTO: 34.8 % (ref 37.5–51)
HGB BLD-MCNC: 12.1 G/DL (ref 13–17.7)
IMM GRANULOCYTES # BLD AUTO: 0.02 10*3/MM3 (ref 0–0.05)
IMM GRANULOCYTES NFR BLD AUTO: 0.3 % (ref 0–0.5)
LDH SERPL-CCNC: 176 U/L (ref 135–225)
LYMPHOCYTES # BLD AUTO: 1.95 10*3/MM3 (ref 0.7–3.1)
LYMPHOCYTES NFR BLD AUTO: 27.1 % (ref 19.6–45.3)
MCH RBC QN AUTO: 31.3 PG (ref 26.6–33)
MCHC RBC AUTO-ENTMCNC: 34.8 G/DL (ref 31.5–35.7)
MCV RBC AUTO: 89.9 FL (ref 79–97)
MONOCYTES # BLD AUTO: 0.43 10*3/MM3 (ref 0.1–0.9)
MONOCYTES NFR BLD AUTO: 6 % (ref 5–12)
NEUTROPHILS NFR BLD AUTO: 4.56 10*3/MM3 (ref 1.7–7)
NEUTROPHILS NFR BLD AUTO: 63.4 % (ref 42.7–76)
NRBC BLD AUTO-RTO: 0 /100 WBC (ref 0–0.2)
PLATELET # BLD AUTO: 37 10*3/MM3 (ref 140–450)
PMV BLD AUTO: 11.8 FL (ref 6–12)
POTASSIUM SERPL-SCNC: 4.7 MMOL/L (ref 3.5–5.2)
PROT SERPL-MCNC: 7.4 G/DL (ref 6–8.5)
RBC # BLD AUTO: 3.87 10*6/MM3 (ref 4.14–5.8)
SODIUM SERPL-SCNC: 138 MMOL/L (ref 136–145)
WBC NRBC COR # BLD: 7.19 10*3/MM3 (ref 3.4–10.8)

## 2023-02-24 PROCEDURE — 99204 OFFICE O/P NEW MOD 45 MIN: CPT | Performed by: INTERNAL MEDICINE

## 2023-02-24 PROCEDURE — 85060 BLOOD SMEAR INTERPRETATION: CPT

## 2023-02-24 PROCEDURE — 80053 COMPREHEN METABOLIC PANEL: CPT

## 2023-02-24 PROCEDURE — 36415 COLL VENOUS BLD VENIPUNCTURE: CPT

## 2023-02-24 PROCEDURE — 83010 ASSAY OF HAPTOGLOBIN QUANT: CPT

## 2023-02-24 PROCEDURE — 83615 LACTATE (LD) (LDH) ENZYME: CPT

## 2023-02-24 PROCEDURE — 85025 COMPLETE CBC W/AUTO DIFF WBC: CPT

## 2023-02-24 NOTE — TELEPHONE ENCOUNTER
Name of Physician notified:   Dr Garrick Cowan  Time Physician Notified:   [id number]    []  Orders received    []  Protocol/Standing orders followed    [x]  No new orders

## 2023-02-24 NOTE — PROGRESS NOTES
New Patient Office Visit      Date: 2023     Patient Name: Huang Almonte  MRN: [id number]  : 1960  Referring Physician: Jeremías Brar    Chief Complaint: Establish care for ITP    History of Present Illness: Huang Almonte is a pleasant 62 y.o. male with past medical history of ITP, hypertension, hyperlipidemia, iron deficiency anemia who presents today for evaluation of ITP. The patient was diagnosed with ITP about 6-7 years ago.  He initially presented to Dr. Duggan in [place] with a platelet count of 100K.  Initial work-up was negative for HIV, B12, folate deficiency and a normal TSH.  Ultrasound of the liver and spleen were normal.  He was started on prednisone with improvement of his platelet count.  Upon steroid taper his platelets dropped to 34 K.  He was then started on prednisone 5 mg every other day with stabilization of his platelets to around 70K- 100K.  Patient was then lost to follow-up due to financial toxicity and insurance issues.  He was managed by his PCP with stable platelet count around 70K-100K until this past year when his platelet counts have slowly trended down to 34K most recently in 2023.  He notes some mild easy bruising but denies any significant petechiae.  Denies any excessive bleeding episodes.  Denies any unexplained fevers, chills, night sweats, weight loss    Oncology History:    Oncology/Hematology History    No history exists.       Subjective      Review of Systems:     Constitutional: Negative for fevers, chills, or weight loss  Eyes: Negative for blurred vision or discharge         Ear/Nose/Throat: Negative for difficulty swallowing, sore throat, LAD                                                       Respiratory: Negative for cough, SOA, wheezing                                                                                        Cardiovascular: Negative for chest pain or palpitations                                                                   Gastrointestinal: Negative for nausea, vomiting or diarrhea                                                                     Genitourinary: Negative for dysuria or hematuria                                                                                           Musculoskeletal: Negative for any joint pains or muscle aches                                                                        Neurologic: Negative for any weakness, headaches, dizziness                                                                         Hematologic: Negative for any easy bleeding or bruising                                                                                   Psychiatric: Negative for anxiety or depression                             Past Medical History:   Past Medical History:   Diagnosis Date   • Acute sinusitis    • Back pain    • Cataract    • Dyspepsia    • Essential hypertension 2020   • Hemorrhoids    • Post-nasal discharge 2021   • Shoulder pain    • Thrombocytopenia (HCC)        Past Surgical History:   Past Surgical History:   Procedure Laterality Date   • NO PAST SURGERIES         Family History:   Family History   Problem Relation Age of Onset   • Hypertension Mother    • Heart attack Father        Social History:   Social History     Socioeconomic History   • Marital status: Single   Tobacco Use   • Smoking status: Former     Packs/day: 1.00     Years: 40.00     Pack years: 40.00     Types: Cigarettes     Quit date: 2019     Years since quittin.0   • Smokeless tobacco: Never   Substance and Sexual Activity   • Alcohol use: Yes     Comment: trying to quit       Medications:     Current Outpatient Medications:   •  atenolol (Tenormin) 50 MG tablet, Take 1 tablet by mouth Daily., Disp: 30 tablet, Rfl: 5  •  atorvastatin (LIPITOR) 20 MG tablet, Take 1 tablet by mouth Daily., Disp: 30 tablet, Rfl: 5  •  Ferrous Sulfate Dried ER (Slow Iron) 160 (50 Fe) MG tablet controlled-release, 1 PO  "QD, Disp: 90 each, Rfl: 1  •  gabapentin (NEURONTIN) 400 MG capsule, Gabapentin 400 MG Oral Capsule; Patient Sig: Gabapentin 400 MG Oral Capsule ; 90; 0; 07-May-2015; Active, Disp: , Rfl:   •  lisinopril-hydrochlorothiazide (PRINZIDE,ZESTORETIC) 20-12.5 MG per tablet, Take 1 tablet by mouth Daily., Disp: 30 tablet, Rfl: 5  •  Omega-3 Fatty Acids (Omega-3 Fish Oil) 500 MG capsule, Take 500 mg by mouth Daily., Disp: , Rfl:   •  oxyCODONE-acetaminophen (PERCOCET) 7.5-325 MG per tablet, , Disp: , Rfl:   •  zolpidem CR (AMBIEN CR) 12.5 MG CR tablet, Take 1 tablet by mouth At Night As Needed for Sleep., Disp: 30 tablet, Rfl: 5    Allergies:   No Known Allergies    Objective     Physical Exam:  Vital Signs:   Vitals:    02/24/23 1439   BP: 156/76   Pulse: 70   Resp: 18   Temp: 97.3 °F (36.3 °C)   TempSrc: Infrared   SpO2: 99%   Weight: 75.2 kg (165 lb 12.8 oz)   Height: 175.3 cm (69.02\")   PainSc:   6   PainLoc: Back     Pain Score    02/24/23 1439   PainSc:   6   PainLoc: Back     ECOG Performance Status: 0 - Asymptomatic    Constitutional: NAD, ECOG 0  Eyes: PERRLA, scleral anicteric  ENT: No LAD, no thyromegaly  Respiratory: CTAB, no wheezing, rales, rhonchi  Cardiovascular: RRR, no murmurs, pulses 2+ bilaterally  Abdomen: soft, NT/ND, no HSM  Musculoskeletal: strength 5/5 bilaterally, no c/c/e  Neurologic: A&O x 3, CN II-XII intact grossly  Psych: mood and affect congruent, no SI or HI    Results Review:   No visits with results within 2 Week(s) from this visit.   Latest known visit with results is:   Office Visit on 01/25/2023   Component Date Value Ref Range Status   • WBC 01/25/2023 7.71  3.40 - 10.80 10*3/mm3 Final   • RBC 01/25/2023 3.74 (L)  4.14 - 5.80 10*6/mm3 Final   • Hemoglobin 01/25/2023 11.5 (L)  13.0 - 17.7 g/dL Final   • Hematocrit 01/25/2023 34.6 (L)  37.5 - 51.0 % Final   • MCV 01/25/2023 92.5  79.0 - 97.0 fL Final   • MCH 01/25/2023 30.7  26.6 - 33.0 pg Final   • MCHC 01/25/2023 33.2  31.5 - 35.7 g/dL " Final   • RDW 01/25/2023 12.4  12.3 - 15.4 % Final   • Platelets 01/25/2023 34 (C)  140 - 450 10*3/mm3 Final   • Neutrophil Rel % 01/25/2023 CANCELED   Final-Edited    Comment: Test not performed    Result canceled by the ancillary.     • Lymphocyte Rel % 01/25/2023 CANCELED   Final-Edited    Comment: Test not performed    Result canceled by the ancillary.     • Monocyte Rel % 01/25/2023 CANCELED   Final-Edited    Comment: Test not performed    Result canceled by the ancillary.     • Eosinophil Rel % 01/25/2023 CANCELED   Final-Edited    Comment: Test not performed    Result canceled by the ancillary.     • Lymphocytes Absolute 01/25/2023 CANCELED   Final-Edited    Comment: Test not performed    Result canceled by the ancillary.     • Eosinophils Absolute 01/25/2023 CANCELED   Final-Edited    Comment: Test not performed    Result canceled by the ancillary.     • Basophils Absolute 01/25/2023 CANCELED   Final-Edited    Comment: Test not performed    Result canceled by the ancillary.     • Glucose 01/25/2023 109 (H)  65 - 99 mg/dL Final   • BUN 01/25/2023 31 (H)  8 - 23 mg/dL Final   • Creatinine 01/25/2023 1.91 (H)  0.76 - 1.27 mg/dL Final   • EGFR Result 01/25/2023 38.9 (L)  >60.0 mL/min/1.73 Final    Comment: GFR Normal >60  Chronic Kidney Disease <60  Kidney Failure <15     • BUN/Creatinine Ratio 01/25/2023 16.2  7.0 - 25.0 Final   • Sodium 01/25/2023 139  136 - 145 mmol/L Final   • Potassium 01/25/2023 5.1  3.5 - 5.2 mmol/L Final   • Chloride 01/25/2023 104  98 - 107 mmol/L Final   • Total CO2 01/25/2023 27.7  22.0 - 29.0 mmol/L Final   • Calcium 01/25/2023 9.3  8.6 - 10.5 mg/dL Final   • Total Protein 01/25/2023 6.9  6.0 - 8.5 g/dL Final   • Albumin 01/25/2023 4.6  3.5 - 5.2 g/dL Final   • Globulin 01/25/2023 2.3  gm/dL Final   • A/G Ratio 01/25/2023 2.0  g/dL Final   • Total Bilirubin 01/25/2023 0.2  0.0 - 1.2 mg/dL Final   • Alkaline Phosphatase 01/25/2023 87  39 - 117 U/L Final   • AST (SGOT) 01/25/2023 22  1  - 40 U/L Final   • ALT (SGPT) 01/25/2023 23  1 - 41 U/L Final   • Total Cholesterol 01/25/2023 207 (H)  0 - 200 mg/dL Final    Comment: Cholesterol Reference Ranges  (U.S. Department of Health and Human Services ATP III  Classifications)  Desirable          <200 mg/dL  Borderline High    200-239 mg/dL  High Risk          >240 mg/dL  Triglyceride Reference Ranges  (U.S. Department of Health and Human Services ATP III  Classifications)  Normal           <150 mg/dL  Borderline High  150-199 mg/dL  High             200-499 mg/dL  Very High        >500 mg/dL  HDL Reference Ranges  (U.S. Department of Health and Human Services ATP III  Classifications)  Low     <40 mg/dl (major risk factor for CHD)  High    >60 mg/dl ('negative' risk factor for CHD)  LDL Reference Ranges  (U.S. Department of Health and Human Services ATP III  Classifications)  Optimal          <100 mg/dL  Near Optimal     100-129 mg/dL  Borderline High  130-159 mg/dL  High             160-189 mg/dL  Very High        >189 mg/dL     • Triglycerides 01/25/2023 379 (H)  0 - 150 mg/dL Final   • HDL Cholesterol 01/25/2023 42  40 - 60 mg/dL Final   • VLDL Cholesterol Carlos 01/25/2023 64 (H)  5 - 40 mg/dL Final   • LDL Chol Calc (NIH) 01/25/2023 101 (H)  0 - 100 mg/dL Final   • PSA 01/25/2023 4.210 (H)  0.000 - 4.000 ng/mL Final   • TIBC 01/25/2023 492  mcg/dL Final   • UIBC 01/25/2023 395 (H)  112 - 346 mcg/dL Final   • Iron 01/25/2023 97  59 - 158 mcg/dL Final   • Iron Saturation 01/25/2023 20  20 - 50 % Final   • Neutrophil Rel % 01/25/2023 61.9  42.7 - 76.0 % Final   • Lymphocyte Rel % 01/25/2023 28.9  19.6 - 45.3 % Final   • Monocyte Rel % 01/25/2023 4.1 (L)  5.0 - 12.0 % Final   • Eosinophil Rel % 01/25/2023 4.1  0.3 - 6.2 % Final   • Basophil Rel % 01/25/2023 1.0  0.0 - 1.5 % Final   • Neutrophils Absolute 01/25/2023 4.77  1.70 - 7.00 10*3/mm3 Final   • Lymphocytes Absolute 01/25/2023 2.23  0.70 - 3.10 10*3/mm3 Final   • Monocytes Absolute 01/25/2023 0.32   0.10 - 0.90 10*3/mm3 Final   • Eosinophil Abs 01/25/2023 0.32  0.00 - 0.40 10*3/mm3 Final   • Basophils Absolute 01/25/2023 0.08  0.00 - 0.20 10*3/mm3 Final   • Differential Comment 01/25/2023 Comment   Final    SMUDGE CELLS                 Slight/1+                   None Seen  N   • Comment 01/25/2023 Comment   Final    RBC MORPHOLOGY               Normal                      Normal     N   • Plt Comment 01/25/2023 Comment   Final    PLATELET MORPHOLOGY          Normal                      Normal     N       No results found.    Assessment / Plan      Assessment/Plan:   1. Chronic ITP (idiopathic thrombocytopenia) (HCC) (Primary)  -Diagnosed about 6-7 years ago and was previously followed by Dr. Duggan in [place]  -Has previously been steroid responsive but is not been on any treatment over the past several years  -Most recent platelet count 34K in January 2023  -We will repeat labs as below  -Should his platelet count drop below 30K, will initiate dexamethasone 40 mg x 4 days  -     CBC & Differential; Future  -     Comprehensive Metabolic Panel; Future  -     Lactate Dehydrogenase; Future  -     Peripheral Blood Smear; Future  -     Haptoglobin; Future    2.  Iron deficiency anemia  -Iron studies stable on oral iron      Follow Up:   Follow-up in 3 months     Garrick Cowan MD  Hematology and Oncology     Please note that portions of this note may have been completed with a voice recognition program. Efforts were made to edit the dictations, but occasionally words are mistranscribed.

## 2023-02-24 NOTE — TELEPHONE ENCOUNTER
Critical Test Results      MD: Dr. Cowan    Date: 2/24/23     Critical test result:  Platelet = 37    Time results received: 16:27      Received from María Elena in Lab.  Notified Kirti Lunsford RN by phone at 16:27.

## 2023-02-25 LAB
CYTOLOGIST CVX/VAG CYTO: NORMAL
HAPTOGLOB SERPL-MCNC: 123 MG/DL (ref 30–200)
PATH INTERP BLD-IMP: NORMAL

## 2023-02-27 ENCOUNTER — TELEPHONE (OUTPATIENT)
Dept: ONCOLOGY | Facility: CLINIC | Age: 63
End: 2023-02-27
Payer: MEDICARE

## 2023-02-27 NOTE — TELEPHONE ENCOUNTER
Huang returned call.  Notified of platelet levels.  Huang states understood and will keep his follow up appointment in May

## 2023-04-01 DIAGNOSIS — D50.9 IRON DEFICIENCY ANEMIA, UNSPECIFIED IRON DEFICIENCY ANEMIA TYPE: ICD-10-CM

## 2023-04-14 ENCOUNTER — TELEPHONE (OUTPATIENT)
Dept: FAMILY MEDICINE CLINIC | Facility: CLINIC | Age: 63
End: 2023-04-14
Payer: MEDICARE

## 2023-04-14 NOTE — TELEPHONE ENCOUNTER
Pt had colonoscopy on 4/4 with Dr Ying. He had one polyp removed. Was advised to repeat test in 5 years. He will call Dr Ying for biopsy results.

## 2023-04-14 NOTE — TELEPHONE ENCOUNTER
Caller: Porfirio Almonte    Relationship: Self    Best call back number: [phone]    Caller requesting test results: PORFIRIO    What test was performed: REMOVE COLON POLYPS    When was the test performed: [id number]    Where was the test performed: [facility]    Additional notes: CALLING FOR RESULTS

## 2023-05-25 ENCOUNTER — TELEPHONE (OUTPATIENT)
Dept: ONCOLOGY | Facility: CLINIC | Age: 63
End: 2023-05-25
Payer: MEDICARE

## 2023-05-25 NOTE — TELEPHONE ENCOUNTER
Caller: Huang Almonte    Relationship to patient: Self    Best call back number: [phone]    Chief complaint: DEATH IN THE FAMILY    Type of visit: F/U 1    Requested date: CALL TO R/S     If rescheduling, when is the original appointment: 5/26/2023

## 2023-06-02 ENCOUNTER — RESEARCH ENCOUNTER (OUTPATIENT)
Dept: OTHER | Facility: OTHER | Age: 63
End: 2023-06-02

## 2023-06-02 ENCOUNTER — OFFICE VISIT (OUTPATIENT)
Dept: ONCOLOGY | Facility: CLINIC | Age: 63
End: 2023-06-02

## 2023-06-02 ENCOUNTER — LAB (OUTPATIENT)
Dept: LAB | Facility: HOSPITAL | Age: 63
End: 2023-06-02
Payer: MEDICARE

## 2023-06-02 VITALS
TEMPERATURE: 97.1 F | SYSTOLIC BLOOD PRESSURE: 103 MMHG | OXYGEN SATURATION: 98 % | RESPIRATION RATE: 16 BRPM | BODY MASS INDEX: 26.52 KG/M2 | DIASTOLIC BLOOD PRESSURE: 60 MMHG | WEIGHT: 165 LBS | HEART RATE: 67 BPM | HEIGHT: 66 IN

## 2023-06-02 DIAGNOSIS — D69.3 CHRONIC ITP (IDIOPATHIC THROMBOCYTOPENIA): Primary | ICD-10-CM

## 2023-06-02 DIAGNOSIS — D69.3 CHRONIC ITP (IDIOPATHIC THROMBOCYTOPENIA): ICD-10-CM

## 2023-06-02 LAB
BASOPHILS # BLD AUTO: 0.04 10*3/MM3 (ref 0–0.2)
BASOPHILS NFR BLD AUTO: 0.5 % (ref 0–1.5)
DEPRECATED RDW RBC AUTO: 43.7 FL (ref 37–54)
EOSINOPHIL # BLD AUTO: 0.12 10*3/MM3 (ref 0–0.4)
EOSINOPHIL NFR BLD AUTO: 1.6 % (ref 0.3–6.2)
ERYTHROCYTE [DISTWIDTH] IN BLOOD BY AUTOMATED COUNT: 13.2 % (ref 12.3–15.4)
HCT VFR BLD AUTO: 32.3 % (ref 37.5–51)
HGB BLD-MCNC: 11.1 G/DL (ref 13–17.7)
IMM GRANULOCYTES # BLD AUTO: 0.01 10*3/MM3 (ref 0–0.05)
IMM GRANULOCYTES NFR BLD AUTO: 0.1 % (ref 0–0.5)
LYMPHOCYTES # BLD AUTO: 2.38 10*3/MM3 (ref 0.7–3.1)
LYMPHOCYTES NFR BLD AUTO: 32.7 % (ref 19.6–45.3)
MCH RBC QN AUTO: 30.7 PG (ref 26.6–33)
MCHC RBC AUTO-ENTMCNC: 34.4 G/DL (ref 31.5–35.7)
MCV RBC AUTO: 89.5 FL (ref 79–97)
MONOCYTES # BLD AUTO: 0.66 10*3/MM3 (ref 0.1–0.9)
MONOCYTES NFR BLD AUTO: 9.1 % (ref 5–12)
NEUTROPHILS NFR BLD AUTO: 4.07 10*3/MM3 (ref 1.7–7)
NEUTROPHILS NFR BLD AUTO: 56 % (ref 42.7–76)
PLATELET # BLD AUTO: 35 10*3/MM3 (ref 140–450)
PMV BLD AUTO: 10.6 FL (ref 6–12)
RBC # BLD AUTO: 3.61 10*6/MM3 (ref 4.14–5.8)
WBC NRBC COR # BLD: 7.28 10*3/MM3 (ref 3.4–10.8)

## 2023-06-02 PROCEDURE — 3074F SYST BP LT 130 MM HG: CPT | Performed by: INTERNAL MEDICINE

## 2023-06-02 PROCEDURE — 99214 OFFICE O/P EST MOD 30 MIN: CPT | Performed by: INTERNAL MEDICINE

## 2023-06-02 PROCEDURE — 1125F AMNT PAIN NOTED PAIN PRSNT: CPT | Performed by: INTERNAL MEDICINE

## 2023-06-02 PROCEDURE — 36415 COLL VENOUS BLD VENIPUNCTURE: CPT

## 2023-06-02 PROCEDURE — 85025 COMPLETE CBC W/AUTO DIFF WBC: CPT

## 2023-06-02 PROCEDURE — 3078F DIAST BP <80 MM HG: CPT | Performed by: INTERNAL MEDICINE

## 2023-06-02 NOTE — PROGRESS NOTES
Follow Up Office Visit      Date: 2023     Patient Name: Huang Almonte  MRN: [id number]  : 1960  Referring Physician: Jeremías Brar     Chief Complaint:  Follow-up for ITP     History of Present Illness: Huang Almonte is a pleasant 62 y.o. male with past medical history of ITP, hypertension, hyperlipidemia, iron deficiency anemia who presents today for evaluation of ITP. The patient was diagnosed with ITP about 6-7 years ago.  He initially presented to Dr. Duggan in [place] with a platelet count of 100K.  Initial work-up was negative for HIV, B12, folate deficiency and a normal TSH.  Ultrasound of the liver and spleen were normal.  He was started on prednisone with improvement of his platelet count.  Upon steroid taper his platelets dropped to 34 K.  He was then started on prednisone 5 mg every other day with stabilization of his platelets to around 70K- 100K.  Patient was then lost to follow-up due to financial toxicity and insurance issues.  He was managed by his PCP with stable platelet count around 70K-100K until this past year when his platelet counts have slowly trended down to 34K most recently in 2023.  He notes some mild easy bruising but denies any significant petechiae.  Denies any excessive bleeding episodes.  Denies any unexplained fevers, chills, night sweats, weight loss     Interval History:  Presents to clinic for follow-up.  Overall stable at this time.  Denies any easy bleeding or bruising episodes.  Denies any new medication changes    Oncology History:    Oncology/Hematology History    No history exists.       Subjective      Review of Systems:   Constitutional: Negative for fevers, chills, or weight loss  Eyes: Negative for blurred vision or discharge         Ear/Nose/Throat: Negative for difficulty swallowing, sore throat, LAD                                                       Respiratory: Negative for cough, SOA, wheezing                                                                                         Cardiovascular: Negative for chest pain or palpitations                                                                  Gastrointestinal: Negative for nausea, vomiting or diarrhea                                                                     Genitourinary: Negative for dysuria or hematuria                                                                                           Musculoskeletal: Negative for any joint pains or muscle aches                                                                        Neurologic: Negative for any weakness, headaches, dizziness                                                                         Hematologic: Negative for any easy bleeding or bruising                                                                                   Psychiatric: Negative for anxiety or depression                          Past Medical History/Past Surgical History/ Family History/ Social History: Reviewed by me and unchanged from my previous documentation done on February 2023.     Medications:     Current Outpatient Medications:   •  atenolol (Tenormin) 50 MG tablet, Take 1 tablet by mouth Daily., Disp: 30 tablet, Rfl: 5  •  atorvastatin (LIPITOR) 20 MG tablet, Take 1 tablet by mouth Daily., Disp: 30 tablet, Rfl: 5  •  Ferrous Sulfate ER 50 MG tablet controlled-release, TAKE ONE TABLET BY MOUTH DAILY, Disp: 90 tablet, Rfl: 0  •  gabapentin (NEURONTIN) 400 MG capsule, Gabapentin 400 MG Oral Capsule; Patient Sig: Gabapentin 400 MG Oral Capsule ; 90; 0; 07-May-2015; Active, Disp: , Rfl:   •  lisinopril-hydrochlorothiazide (PRINZIDE,ZESTORETIC) 20-12.5 MG per tablet, Take 1 tablet by mouth Daily., Disp: 30 tablet, Rfl: 5  •  Omega-3 Fatty Acids (Omega-3 Fish Oil) 500 MG capsule, Take 500 mg by mouth Daily., Disp: , Rfl:   •  oxyCODONE-acetaminophen (PERCOCET) 7.5-325 MG per tablet, , Disp: , Rfl:   •  zolpidem CR (AMBIEN CR) 12.5 MG CR tablet,  "Take 1 tablet by mouth At Night As Needed for Sleep., Disp: 30 tablet, Rfl: 5    Allergies:   No Known Allergies    Objective     Physical Exam:  Vital Signs:   Vitals:    06/02/23 1309   BP: 103/60   Pulse: 67   Resp: 16   Temp: 97.1 °F (36.2 °C)   TempSrc: Infrared   SpO2: 98%   Weight: 74.8 kg (165 lb)   Height: 167.6 cm (66\")  Comment: per patient   PainSc:   4   PainLoc: Back     Pain Score    06/02/23 1309   PainSc:   4   PainLoc: Back     ECOG Performance Status: 0 - Asymptomatic    Constitutional: NAD, ECOG 0  Eyes: PERRLA, scleral anicteric  ENT: No LAD, no thyromegaly  Respiratory: CTAB, no wheezing, rales, rhonchi  Cardiovascular: RRR, no murmurs, pulses 2+ bilaterally  Abdomen: soft, NT/ND, no HSM  Musculoskeletal: strength 5/5 bilaterally, no c/c/e  Neurologic: A&O x 3, CN II-XII intact grossly    Results Review:   Lab on 06/02/2023   Component Date Value Ref Range Status   • WBC 06/02/2023 7.28  3.40 - 10.80 10*3/mm3 Final   • RBC 06/02/2023 3.61 (L)  4.14 - 5.80 10*6/mm3 Final   • Hemoglobin 06/02/2023 11.1 (L)  13.0 - 17.7 g/dL Final   • Hematocrit 06/02/2023 32.3 (L)  37.5 - 51.0 % Final   • MCV 06/02/2023 89.5  79.0 - 97.0 fL Final   • MCH 06/02/2023 30.7  26.6 - 33.0 pg Final   • MCHC 06/02/2023 34.4  31.5 - 35.7 g/dL Final   • RDW 06/02/2023 13.2  12.3 - 15.4 % Final   • RDW-SD 06/02/2023 43.7  37.0 - 54.0 fl Final   • MPV 06/02/2023 10.6  6.0 - 12.0 fL Final   • Platelets 06/02/2023 35 (L)  140 - 450 10*3/mm3 Final   • Neutrophil % 06/02/2023 56.0  42.7 - 76.0 % Final   • Lymphocyte % 06/02/2023 32.7  19.6 - 45.3 % Final   • Monocyte % 06/02/2023 9.1  5.0 - 12.0 % Final   • Eosinophil % 06/02/2023 1.6  0.3 - 6.2 % Final   • Basophil % 06/02/2023 0.5  0.0 - 1.5 % Final   • Immature Grans % 06/02/2023 0.1  0.0 - 0.5 % Final   • Neutrophils, Absolute 06/02/2023 4.07  1.70 - 7.00 10*3/mm3 Final   • Lymphocytes, Absolute 06/02/2023 2.38  0.70 - 3.10 10*3/mm3 Final   • Monocytes, Absolute 06/02/2023 " 0.66  0.10 - 0.90 10*3/mm3 Final   • Eosinophils, Absolute 06/02/2023 0.12  0.00 - 0.40 10*3/mm3 Final   • Basophils, Absolute 06/02/2023 0.04  0.00 - 0.20 10*3/mm3 Final   • Immature Grans, Absolute 06/02/2023 0.01  0.00 - 0.05 10*3/mm3 Final       No results found.    Assessment / Plan      Assessment/Plan:   1. Chronic ITP (idiopathic thrombocytopenia) (HCC) (Primary)  -Diagnosed about 6-7 years ago and was previously followed by Dr. Duggan in [place]  -Has previously been steroid responsive but is not been on any treatment over the past several years  -Most recent platelet count 34K in January 2023  -Platelet count 35K in June 2023  -No indication for treatment at this time.  Should his platelet count drop below 30K, will use pulse dose steroids  -Plan to repeat CT scan in 6 months     2.  Iron deficiency anemia  -Iron studies stable on oral iron      Follow Up:   Follow-up in 6 months     Garrick Cowan MD  Hematology and Oncology     Please note that portions of this note may have been completed with a voice recognition program. Efforts were made to edit the dictations, but occasionally words are mistranscribed.

## 2023-06-02 NOTE — RESEARCH
Patient Name:  Huang Almonte  YOB: 1960  Patient Age:  62 y.o.  Patient's Sex:  male    Date of Service:  06/02/2023    Provider: MAREN Cowan                     RESEARCH NOTE                  Protocol:  P[facility]     Information (all 8 elements of the ICF) concerning the protocol including, description of procedures to be followed, participant responsibilities, confidentiality, foreseeable risks, compensation, availability, benefits and alternatives to participation, was reviewed with the research participant in an understandable language. The participant was encouraged to ask questions throughout the informed consent process. Any questions and/or concerns were answered to the participant’s satisfaction.     After giving the participant time to consider, the participant chose to participate in the study. The informed consent document signature process was completed.     Participant authorization for the use and disclosure of protected health information for research purposes (HIPAA Privacy Rule) was acknowledged and signed on the date noted on the consent form.     Contact information for the research department and physician/investigator was provided. A copy of the signed informed consent form was given to the study participant.     No study specific assessments were completed prior to obtaining informed consent.     By signing this document, I attest that I participated in the informed consent discussion with the participant.       Participant specimen was collected today on 6/2/2023.

## 2023-06-08 DIAGNOSIS — D50.9 IRON DEFICIENCY ANEMIA, UNSPECIFIED IRON DEFICIENCY ANEMIA TYPE: ICD-10-CM

## 2023-07-25 ENCOUNTER — OFFICE VISIT (OUTPATIENT)
Dept: FAMILY MEDICINE CLINIC | Facility: CLINIC | Age: 63
End: 2023-07-25
Payer: MEDICARE

## 2023-07-25 ENCOUNTER — NURSE TRIAGE (OUTPATIENT)
Dept: CALL CENTER | Facility: HOSPITAL | Age: 63
End: 2023-07-25
Payer: MEDICARE

## 2023-07-25 VITALS
TEMPERATURE: 98.2 F | WEIGHT: 165 LBS | SYSTOLIC BLOOD PRESSURE: 122 MMHG | HEART RATE: 66 BPM | RESPIRATION RATE: 18 BRPM | DIASTOLIC BLOOD PRESSURE: 64 MMHG | BODY MASS INDEX: 26.52 KG/M2 | HEIGHT: 66 IN | OXYGEN SATURATION: 99 %

## 2023-07-25 DIAGNOSIS — D50.9 IRON DEFICIENCY ANEMIA, UNSPECIFIED IRON DEFICIENCY ANEMIA TYPE: ICD-10-CM

## 2023-07-25 DIAGNOSIS — E78.00 HYPERCHOLESTEREMIA: ICD-10-CM

## 2023-07-25 DIAGNOSIS — I10 ESSENTIAL HYPERTENSION: ICD-10-CM

## 2023-07-25 DIAGNOSIS — G47.09 OTHER INSOMNIA: ICD-10-CM

## 2023-07-25 PROCEDURE — 3078F DIAST BP <80 MM HG: CPT | Performed by: FAMILY MEDICINE

## 2023-07-25 PROCEDURE — 1159F MED LIST DOCD IN RCRD: CPT | Performed by: FAMILY MEDICINE

## 2023-07-25 PROCEDURE — 3074F SYST BP LT 130 MM HG: CPT | Performed by: FAMILY MEDICINE

## 2023-07-25 PROCEDURE — 99214 OFFICE O/P EST MOD 30 MIN: CPT | Performed by: FAMILY MEDICINE

## 2023-07-25 PROCEDURE — 1160F RVW MEDS BY RX/DR IN RCRD: CPT | Performed by: FAMILY MEDICINE

## 2023-07-25 RX ORDER — ZOLPIDEM TARTRATE 12.5 MG/1
12.5 TABLET, FILM COATED, EXTENDED RELEASE ORAL NIGHTLY PRN
Qty: 30 TABLET | Refills: 5 | Status: SHIPPED | OUTPATIENT
Start: 2023-07-25

## 2023-07-25 RX ORDER — LISINOPRIL AND HYDROCHLOROTHIAZIDE 20; 12.5 MG/1; MG/1
1 TABLET ORAL DAILY
Qty: 30 TABLET | Refills: 5 | Status: SHIPPED | OUTPATIENT
Start: 2023-07-25

## 2023-07-25 RX ORDER — ATORVASTATIN CALCIUM 20 MG/1
20 TABLET, FILM COATED ORAL DAILY
Qty: 30 TABLET | Refills: 5 | Status: SHIPPED | OUTPATIENT
Start: 2023-07-25

## 2023-07-25 NOTE — PROGRESS NOTES
Subjective   Huang Almonte is a 62 y.o. male.     Hypertension       When he has been standing up he has been feeling dizzy and light headed  Had one episode where he actually did pass out  Has been worse the past 6+ weeks  Can have some dizziness with moving head as well      Huang Almonte  is here for follow-up of hypertension of several years duration. He is not exercising and is not adherent to a low-salt diet. Patient does not check his blood pressure. He is compliant with meds.        Huang Almonte returns today for follow up of Hyperlipidemia  Huang indicates his exercise level as irregularly.  Diet: eating the same  Patient is compliant with medications   Any side effects to medications:   chest pain No myalgia No memory change No  Pt is due for labs      The following portions of the patient's history were reviewed and updated as appropriate: allergies, current medications, past family history, past medical history, past social history, past surgical history, and problem list.    Review of Systems   Constitutional: Negative.    Neurological:  Positive for light-headedness.     Objective   Physical Exam  Vitals and nursing note reviewed.   Constitutional:       General: He is not in acute distress.     Appearance: Normal appearance. He is well-developed.   HENT:      Head: Normocephalic and atraumatic.      Right Ear: Hearing, tympanic membrane, ear canal and external ear normal.      Left Ear: Hearing, tympanic membrane, ear canal and external ear normal.      Nose: Nose normal.      Mouth/Throat:      Pharynx: Uvula midline.   Eyes:      Conjunctiva/sclera: Conjunctivae normal.   Cardiovascular:      Rate and Rhythm: Normal rate and regular rhythm.      Heart sounds: Normal heart sounds.   Pulmonary:      Effort: Pulmonary effort is normal.      Breath sounds: Normal breath sounds.   Abdominal:      General: Abdomen is flat. Bowel sounds are normal. There is no distension.      Palpations: Abdomen is soft.       Tenderness: There is no abdominal tenderness. There is no guarding or rebound.   Musculoskeletal:      Cervical back: Normal range of motion.   Lymphadenopathy:      Cervical: No cervical adenopathy.   Neurological:      Mental Status: He is alert and oriented to person, place, and time.   Psychiatric:         Mood and Affect: Mood normal.         Behavior: Behavior normal.         Thought Content: Thought content normal.         Judgment: Judgment normal.       Assessment & Plan   Diagnoses and all orders for this visit:    1. Essential hypertension  -     lisinopril-hydrochlorothiazide (PRINZIDE,ZESTORETIC) 20-12.5 MG per tablet; Take 1 tablet by mouth Daily.  Dispense: 30 tablet; Refill: 5  -     CBC & Differential  -     Comprehensive Metabolic Panel    2. Hypercholesteremia  -     atorvastatin (LIPITOR) 20 MG tablet; Take 1 tablet by mouth Daily.  Dispense: 30 tablet; Refill: 5  -     Comprehensive Metabolic Panel  -     Lipid Panel    3. Iron deficiency anemia, unspecified iron deficiency anemia type  -     Ferrous Sulfate ER 50 MG tablet controlled-release; Take 1 tablet by mouth Daily.  Dispense: 60 tablet; Refill: 0  -     CBC & Differential  -     Iron Profile    4. Other insomnia  -     zolpidem CR (AMBIEN CR) 12.5 MG CR tablet; Take 1 tablet by mouth At Night As Needed for Sleep.  Dispense: 30 tablet; Refill: 5      BP doing well on prinzide, continue medicine  Refilled lipiotor and check lipids  Ok iron and check levels  Ok ambien for sleep, YESY reviewed

## 2023-07-26 DIAGNOSIS — D50.9 IRON DEFICIENCY ANEMIA, UNSPECIFIED IRON DEFICIENCY ANEMIA TYPE: Primary | ICD-10-CM

## 2023-07-26 DIAGNOSIS — D69.3 CHRONIC ITP (IDIOPATHIC THROMBOCYTOPENIA): ICD-10-CM

## 2023-07-26 LAB
ALBUMIN SERPL-MCNC: 4.6 G/DL (ref 3.5–5.2)
ALBUMIN/GLOB SERPL: 2.7 G/DL
ALP SERPL-CCNC: 82 U/L (ref 39–117)
ALT SERPL-CCNC: 16 U/L (ref 1–41)
AST SERPL-CCNC: 16 U/L (ref 1–40)
BASOPHILS # BLD AUTO: 0.07 10*3/MM3 (ref 0–0.2)
BASOPHILS # BLD MANUAL: 0.14 10*3/MM3 (ref 0–0.2)
BASOPHILS NFR BLD AUTO: 1 % (ref 0–1.5)
BASOPHILS NFR BLD MANUAL: 2 % (ref 0–1.5)
BILIRUB SERPL-MCNC: <0.2 MG/DL (ref 0–1.2)
BUN SERPL-MCNC: 35 MG/DL (ref 8–23)
BUN/CREAT SERPL: 16 (ref 7–25)
CALCIUM SERPL-MCNC: 9.3 MG/DL (ref 8.6–10.5)
CHLORIDE SERPL-SCNC: 105 MMOL/L (ref 98–107)
CHOLEST SERPL-MCNC: 177 MG/DL (ref 0–200)
CO2 SERPL-SCNC: 23.2 MMOL/L (ref 22–29)
CREAT SERPL-MCNC: 2.19 MG/DL (ref 0.76–1.27)
DIFFERENTIAL COMMENT: ABNORMAL
EGFRCR SERPLBLD CKD-EPI 2021: 33 ML/MIN/1.73
EOSINOPHIL # BLD AUTO: 0.08 10*3/MM3 (ref 0–0.4)
EOSINOPHIL NFR BLD AUTO: 1.1 % (ref 0.3–6.2)
ERYTHROCYTE [DISTWIDTH] IN BLOOD BY AUTOMATED COUNT: 12.7 % (ref 12.3–15.4)
GLOBULIN SER CALC-MCNC: 1.7 GM/DL
GLUCOSE SERPL-MCNC: 108 MG/DL (ref 65–99)
HCT VFR BLD AUTO: 29.3 % (ref 37.5–51)
HDLC SERPL-MCNC: 41 MG/DL (ref 40–60)
HGB BLD-MCNC: 9.8 G/DL (ref 13–17.7)
IMM GRANULOCYTES # BLD AUTO: 0.02 10*3/MM3 (ref 0–0.05)
IMM GRANULOCYTES NFR BLD AUTO: 0.3 % (ref 0–0.5)
IRON SATN MFR SERPL: 27 % (ref 20–50)
IRON SERPL-MCNC: 106 MCG/DL (ref 59–158)
LDLC SERPL CALC-MCNC: 92 MG/DL (ref 0–100)
LYMPHOCYTES # BLD AUTO: 1.71 10*3/MM3 (ref 0.7–3.1)
LYMPHOCYTES # BLD MANUAL: 1.17 10*3/MM3 (ref 0.7–3.1)
LYMPHOCYTES NFR BLD AUTO: 23.9 % (ref 19.6–45.3)
LYMPHOCYTES NFR BLD MANUAL: 16.3 % (ref 19.6–45.3)
MCH RBC QN AUTO: 30.6 PG (ref 26.6–33)
MCHC RBC AUTO-ENTMCNC: 33.4 G/DL (ref 31.5–35.7)
MCV RBC AUTO: 91.6 FL (ref 79–97)
MONOCYTES # BLD AUTO: 0.47 10*3/MM3 (ref 0.1–0.9)
MONOCYTES # BLD MANUAL: 0.22 10*3/MM3 (ref 0.1–0.9)
MONOCYTES NFR BLD AUTO: 6.6 % (ref 5–12)
MONOCYTES NFR BLD MANUAL: 3.1 % (ref 5–12)
NEUTROPHILS # BLD AUTO: 4.8 10*3/MM3 (ref 1.7–7)
NEUTROPHILS # BLD MANUAL: 5.62 10*3/MM3 (ref 1.7–7)
NEUTROPHILS NFR BLD AUTO: 67.1 % (ref 42.7–76)
NEUTROPHILS NFR BLD MANUAL: 78.6 % (ref 42.7–76)
PLATELET # BLD AUTO: 45 10*3/MM3 (ref 140–450)
PLATELET BLD QL SMEAR: ABNORMAL
POTASSIUM SERPL-SCNC: 4.7 MMOL/L (ref 3.5–5.2)
PROT SERPL-MCNC: 6.3 G/DL (ref 6–8.5)
RBC # BLD AUTO: 3.2 10*6/MM3 (ref 4.14–5.8)
RBC MORPH BLD: ABNORMAL
SODIUM SERPL-SCNC: 140 MMOL/L (ref 136–145)
TIBC SERPL-MCNC: 396 MCG/DL
TRIGL SERPL-MCNC: 263 MG/DL (ref 0–150)
UIBC SERPL-MCNC: 290 MCG/DL (ref 112–346)
VLDLC SERPL CALC-MCNC: 44 MG/DL (ref 5–40)
WBC # BLD AUTO: 7.15 10*3/MM3 (ref 3.4–10.8)

## 2023-07-26 NOTE — TELEPHONE ENCOUNTER
"Ehsan with [facility] called critical lab of platelets 45, drawn 7/25 @1052  Notified Dr. Brar of lab, no orders received  Reason for Disposition   Lab or radiology calling with CRITICAL test results    Additional Information   Negative: Lab calling with strep throat test results and triager can call in prescription   Negative: Lab calling with urinalysis test results and triager can call in prescription   Negative: Medication questions   Negative: Medication renewal and refill questions   Negative: Pre-operative or pre-procedural questions   Negative: ED call to PCP (i.e., primary care provider; doctor, NP, or PA)   Negative: Doctor (or NP/PA) call to PCP   Negative: Call about patient who is currently hospitalized    Answer Assessment - Initial Assessment Questions  1. REASON FOR CALL or QUESTION: \"What is your reason for calling today?\" or \"How can I best  help you?\" or \"What question do you have that I can help answer?\"      Critical lab value, plt 45 drawn 7/25 @1052  2. CALLER: Document the source of call. (e.g., laboratory, patient).      Ehsan with  [facility]    Protocols used: PCP Call - No Triage-ADULT-    "

## 2023-08-20 DIAGNOSIS — I10 ESSENTIAL HYPERTENSION: ICD-10-CM

## 2023-08-21 RX ORDER — ATENOLOL 50 MG/1
TABLET ORAL
Qty: 30 TABLET | Refills: 5 | Status: SHIPPED | OUTPATIENT
Start: 2023-08-21

## 2023-08-22 ENCOUNTER — TELEPHONE (OUTPATIENT)
Dept: FAMILY MEDICINE CLINIC | Facility: CLINIC | Age: 63
End: 2023-08-22
Payer: MEDICARE

## 2023-08-22 DIAGNOSIS — F17.200 CURRENT EVERY DAY SMOKER: Primary | ICD-10-CM

## 2023-08-22 DIAGNOSIS — Z87.891 PERSONAL HISTORY OF NICOTINE DEPENDENCE: ICD-10-CM

## 2023-08-22 NOTE — TELEPHONE ENCOUNTER
Caller: Huang Almonte A    Relationship to patient: Self    Best call back number: [phone]     Patient is needing: PATIENT STATES HE WAS SUPPOSED TO TALK TO MD LIZARRAGA ABOUT HIS LUNG CANCER SCREENING. HE STATES  WAS REMINDED BY [facility] THAT HE NEEDS TO HAVE ANOTHER ONE DONE AND HE NEEDS A REFERRAL TO COMPLETE THE SCREENING.     PLEASE CALL PATIENT BACK, IF NO ANSWER LEAVE A DETAILED MESSAGE.

## 2023-08-30 ENCOUNTER — TELEPHONE (OUTPATIENT)
Dept: FAMILY MEDICINE CLINIC | Facility: CLINIC | Age: 63
End: 2023-08-30
Payer: MEDICARE

## 2023-08-30 NOTE — TELEPHONE ENCOUNTER
Caller: [facility]    Relationship to patient: Other    Best call back number: [phone]     Patient is needing: JOY CALLED TO INQUIRE IF PATIENT HAS BEEN SCHEDULED FOR ANOTHER CT Chest Low Dose Cancer Screening WO  IF A NEW ORDER HAS BEEN PLACED OR WHAT IS THE PLAN OF CARE.    STATED IT IS RECOMMENDED THE PATIENT HAVE ANOTHER CT DONE.    STATED PATIENTS LAST ONE WAS IN JULY.

## 2023-09-01 ENCOUNTER — TELEPHONE (OUTPATIENT)
Dept: FAMILY MEDICINE CLINIC | Facility: CLINIC | Age: 63
End: 2023-09-01
Payer: MEDICARE

## 2023-09-01 NOTE — TELEPHONE ENCOUNTER
Caller: Huang Almonte    Relationship: Self    Best call back number: [phone]    What is the medical concern/diagnosis: LUNG SCREENING    What specialty or service is being requested: IN [place], JUST A FEW DOORS DOWN FROM OFFICE. PT STATED THAT HE RECEIVED A LETTER TO SCHEDULE FOR LUNG SCREENING

## 2023-09-01 NOTE — TELEPHONE ENCOUNTER
CALLED PATIENT - STATED HE NEEDS TO CALL SCHEDULING AS THEY SCHEDULE THESE APPOINTMENTS NOT THE OFFICE - THE NUMBER ON THE LETTER IS THE CORRECT NUMBER TO CALL THEM [phone]. TOLD PATIENT I WOULD CALL SCHEDULING TO SEE IF THEY WILL CALL HIM FIRST TO SCHEDULE.

## 2023-09-22 ENCOUNTER — HOSPITAL ENCOUNTER (OUTPATIENT)
Dept: CT IMAGING | Facility: HOSPITAL | Age: 63
Discharge: HOME | End: 2023-09-22
Admitting: FAMILY MEDICINE
Payer: MEDICARE

## 2023-09-22 ENCOUNTER — LAB (OUTPATIENT)
Dept: FAMILY MEDICINE CLINIC | Facility: CLINIC | Age: 63
End: 2023-09-22
Payer: MEDICARE

## 2023-09-22 DIAGNOSIS — Z87.891 PERSONAL HISTORY OF NICOTINE DEPENDENCE: ICD-10-CM

## 2023-09-22 DIAGNOSIS — F17.200 CURRENT EVERY DAY SMOKER: ICD-10-CM

## 2023-09-22 DIAGNOSIS — D69.3 CHRONIC ITP (IDIOPATHIC THROMBOCYTOPENIA): ICD-10-CM

## 2023-09-22 PROCEDURE — 71271 CT THORAX LUNG CANCER SCR C-: CPT

## 2023-09-23 ENCOUNTER — NURSE TRIAGE (OUTPATIENT)
Dept: CALL CENTER | Facility: HOSPITAL | Age: 63
End: 2023-09-23
Payer: MEDICARE

## 2023-09-23 DIAGNOSIS — D50.9 IRON DEFICIENCY ANEMIA, UNSPECIFIED IRON DEFICIENCY ANEMIA TYPE: ICD-10-CM

## 2023-09-23 NOTE — TELEPHONE ENCOUNTER
"Reason for Disposition   Lab or radiology calling with CRITICAL test results    Additional Information   Negative: Lab calling with strep throat test results and triager can call in prescription   Negative: Lab calling with urinalysis test results and triager can call in prescription   Negative: Medication questions   Negative: Medication renewal and refill questions   Negative: Pre-operative or pre-procedural questions   Negative: ED call to PCP (i.e., primary care provider; doctor, NP, or PA)   Negative: Doctor (or NP/PA) call to PCP   Negative: Call about patient who is currently hospitalized   Negative: [1] Follow-up call from patient regarding patient's clinical status AND [2] information urgent   Negative: [1] Caller requests to speak ONLY to PCP AND [2] URGENT question   Negative: [1] Caller requests to speak to PCP now AND [2] won't tell us reason for call  (Exception: If 10 pm to 6 am, caller must first discuss reason for the call.)   Negative: Notification of hospital admission   Negative: Notification of death   Negative: Caller requesting lab results  (Exception: Routine or non-urgent lab result.)    Answer Assessment - Initial Assessment Questions  1. REASON FOR CALL or QUESTION: \"What is your reason for calling today?\" or \"How can I best  help you?\" or \"What question do you have that I can help answer?\"      Critical Lab  2. CALLER: Document the source of call. (e.g., laboratory, patient).      Lab from [facility] calling Critical Low Pt. Count 29    Protocols used: PCP Call - No Triage-ADULT-    "

## 2023-09-23 NOTE — TELEPHONE ENCOUNTER
Shaila/L[facility] form [facility] calling Critical lab value for Dr. Brar Pt. Critical low plt. count 29, drawn [facility] stat 09/22/23 at 08:03, sent to [facility] and results called to [facility] [place], now being reported to Dr. Brar service. Dr. Abernathy contacted told of critical lab value ,per secure chat.

## 2023-11-24 DIAGNOSIS — E78.00 HYPERCHOLESTEREMIA: ICD-10-CM

## 2023-11-25 RX ORDER — ATORVASTATIN CALCIUM 20 MG/1
20 TABLET, FILM COATED ORAL DAILY
Qty: 90 TABLET | Refills: 0 | Status: SHIPPED | OUTPATIENT
Start: 2023-11-25

## 2023-12-06 DIAGNOSIS — D69.3 CHRONIC ITP (IDIOPATHIC THROMBOCYTOPENIA): Primary | ICD-10-CM

## 2023-12-08 ENCOUNTER — LAB (OUTPATIENT)
Dept: LAB | Facility: HOSPITAL | Age: 63
End: 2023-12-08
Payer: MEDICARE

## 2023-12-08 ENCOUNTER — RESEARCH ENCOUNTER (OUTPATIENT)
Dept: OTHER | Facility: OTHER | Age: 63
End: 2023-12-08
Payer: MEDICARE

## 2023-12-08 ENCOUNTER — OFFICE VISIT (OUTPATIENT)
Dept: ONCOLOGY | Facility: CLINIC | Age: 63
End: 2023-12-08
Payer: MEDICARE

## 2023-12-08 VITALS
TEMPERATURE: 97.7 F | BODY MASS INDEX: 25.71 KG/M2 | WEIGHT: 160 LBS | DIASTOLIC BLOOD PRESSURE: 88 MMHG | HEART RATE: 72 BPM | SYSTOLIC BLOOD PRESSURE: 146 MMHG | OXYGEN SATURATION: 99 % | HEIGHT: 66 IN | RESPIRATION RATE: 18 BRPM

## 2023-12-08 DIAGNOSIS — D69.3 CHRONIC ITP (IDIOPATHIC THROMBOCYTOPENIA): Primary | ICD-10-CM

## 2023-12-08 DIAGNOSIS — D69.3 CHRONIC ITP (IDIOPATHIC THROMBOCYTOPENIA): ICD-10-CM

## 2023-12-08 LAB
BASOPHILS # BLD AUTO: 0.05 10*3/MM3 (ref 0–0.2)
BASOPHILS NFR BLD AUTO: 0.7 % (ref 0–1.5)
DEPRECATED RDW RBC AUTO: 45.3 FL (ref 37–54)
EOSINOPHIL # BLD AUTO: 0.05 10*3/MM3 (ref 0–0.4)
EOSINOPHIL NFR BLD AUTO: 0.7 % (ref 0.3–6.2)
ERYTHROCYTE [DISTWIDTH] IN BLOOD BY AUTOMATED COUNT: 13.7 % (ref 12.3–15.4)
HCT VFR BLD AUTO: 36 % (ref 37.5–51)
HGB BLD-MCNC: 12.2 G/DL (ref 13–17.7)
IMM GRANULOCYTES # BLD AUTO: 0.01 10*3/MM3 (ref 0–0.05)
IMM GRANULOCYTES NFR BLD AUTO: 0.1 % (ref 0–0.5)
LYMPHOCYTES # BLD AUTO: 2.38 10*3/MM3 (ref 0.7–3.1)
LYMPHOCYTES NFR BLD AUTO: 31.4 % (ref 19.6–45.3)
MCH RBC QN AUTO: 30.3 PG (ref 26.6–33)
MCHC RBC AUTO-ENTMCNC: 33.9 G/DL (ref 31.5–35.7)
MCV RBC AUTO: 89.3 FL (ref 79–97)
MONOCYTES # BLD AUTO: 0.45 10*3/MM3 (ref 0.1–0.9)
MONOCYTES NFR BLD AUTO: 5.9 % (ref 5–12)
NEUTROPHILS NFR BLD AUTO: 4.63 10*3/MM3 (ref 1.7–7)
NEUTROPHILS NFR BLD AUTO: 61.2 % (ref 42.7–76)
PLATELET # BLD AUTO: 44 10*3/MM3 (ref 140–450)
PMV BLD AUTO: 10.6 FL (ref 6–12)
RBC # BLD AUTO: 4.03 10*6/MM3 (ref 4.14–5.8)
WBC NRBC COR # BLD AUTO: 7.57 10*3/MM3 (ref 3.4–10.8)

## 2023-12-08 PROCEDURE — 3077F SYST BP >= 140 MM HG: CPT | Performed by: INTERNAL MEDICINE

## 2023-12-08 PROCEDURE — 99214 OFFICE O/P EST MOD 30 MIN: CPT | Performed by: INTERNAL MEDICINE

## 2023-12-08 PROCEDURE — 3079F DIAST BP 80-89 MM HG: CPT | Performed by: INTERNAL MEDICINE

## 2023-12-08 PROCEDURE — 85025 COMPLETE CBC W/AUTO DIFF WBC: CPT

## 2023-12-08 PROCEDURE — 36415 COLL VENOUS BLD VENIPUNCTURE: CPT

## 2023-12-08 PROCEDURE — 1126F AMNT PAIN NOTED NONE PRSNT: CPT | Performed by: INTERNAL MEDICINE

## 2023-12-08 NOTE — PROGRESS NOTES
Follow Up Office Visit      Date: 2023     Patient Name: Huang Almonte  MRN: [id number]  : 1960  Referring Physician: Jeremías Brar     Chief Complaint:  Follow-up for ITP     History of Present Illness: Huang Almonte is a pleasant 62 y.o. male with past medical history of ITP, hypertension, hyperlipidemia, iron deficiency anemia who presents today for evaluation of ITP. The patient was diagnosed with ITP about 6-7 years ago.  He initially presented to Dr. Duggan in [place] with a platelet count of 100K.  Initial work-up was negative for HIV, B12, folate deficiency and a normal TSH.  Ultrasound of the liver and spleen were normal.  He was started on prednisone with improvement of his platelet count.  Upon steroid taper his platelets dropped to 34 K.  He was then started on prednisone 5 mg every other day with stabilization of his platelets to around 70K- 100K.  Patient was then lost to follow-up due to financial toxicity and insurance issues.  He was managed by his PCP with stable platelet count around 70K-100K until this past year when his platelet counts have slowly trended down to 34K most recently in 2023.  He notes some mild easy bruising but denies any significant petechiae.  Denies any excessive bleeding episodes.  Denies any unexplained fevers, chills, night sweats, weight loss      Interval History:  Presents to clinic for follow-up.  Doing well today.  Denies any easy bleeding or bruising episodes.  Denies any petechiae.  Compliant with his home medications    Oncology History:    Oncology/Hematology History    No history exists.       Subjective      Review of Systems:   Constitutional: Negative for fevers, chills, or weight loss  Eyes: Negative for blurred vision or discharge         Ear/Nose/Throat: Negative for difficulty swallowing, sore throat, LAD                                                       Respiratory: Negative for cough, SOA, wheezing                                                                                         Cardiovascular: Negative for chest pain or palpitations                                                                  Gastrointestinal: Negative for nausea, vomiting or diarrhea                                                                     Genitourinary: Negative for dysuria or hematuria                                                                                           Musculoskeletal: Negative for any joint pains or muscle aches                                                                        Neurologic: Negative for any weakness, headaches, dizziness                                                                         Hematologic: Negative for any easy bleeding or bruising                                                                                   Psychiatric: Negative for anxiety or depression                          Past Medical History/Past Surgical History/ Family History/ Social History: Reviewed by me and unchanged from my previous documentation done on June 2023.     Medications:     Current Outpatient Medications:     atenolol (TENORMIN) 50 MG tablet, TAKE ONE TABLET BY MOUTH DAILY, Disp: 30 tablet, Rfl: 5    atorvastatin (LIPITOR) 20 MG tablet, TAKE ONE TABLET BY MOUTH DAILY, Disp: 90 tablet, Rfl: 0    Ferrous Sulfate ER 50 MG tablet controlled-release, TAKE 1 TABLET BY MOUTH DAILY, Disp: 60 tablet, Rfl: 0    gabapentin (NEURONTIN) 400 MG capsule, Gabapentin 400 MG Oral Capsule; Patient Sig: Gabapentin 400 MG Oral Capsule ; 90; 0; 07-May-2015; Active, Disp: , Rfl:     lisinopril-hydrochlorothiazide (PRINZIDE,ZESTORETIC) 20-12.5 MG per tablet, Take 1 tablet by mouth Daily., Disp: 30 tablet, Rfl: 5    Omega-3 Fatty Acids (Omega-3 Fish Oil) 500 MG capsule, Take 1 capsule by mouth Daily., Disp: , Rfl:     oxyCODONE-acetaminophen (PERCOCET) 7.5-325 MG per tablet, , Disp: , Rfl:     zolpidem CR (AMBIEN CR) 12.5 MG CR  "tablet, Take 1 tablet by mouth At Night As Needed for Sleep., Disp: 30 tablet, Rfl: 5    Allergies:   No Known Allergies    Objective     Physical Exam:  Vital Signs:   Vitals:    12/08/23 1328   BP: 146/88   Pulse: 72   Resp: 18   Temp: 97.7 °F (36.5 °C)   TempSrc: Temporal   SpO2: 99%   Weight: 72.6 kg (160 lb)   Height: 167.6 cm (65.98\")   PainSc: 0-No pain     Pain Score    12/08/23 1328   PainSc: 0-No pain     ECOG Performance Status: 0 - Asymptomatic    Constitutional: NAD, ECOG 0  Eyes: PERRLA, scleral anicteric  ENT: No LAD, no thyromegaly  Respiratory: CTAB, no wheezing, rales, rhonchi  Cardiovascular: RRR, no murmurs, pulses 2+ bilaterally  Abdomen: soft, NT/ND, no HSM  Musculoskeletal: strength 5/5 bilaterally, no c/c/e  Neurologic: A&O x 3, CN II-XII intact grossly    Results Review:   Lab on 12/08/2023   Component Date Value Ref Range Status    WBC 12/08/2023 7.57  3.40 - 10.80 10*3/mm3 Final    RBC 12/08/2023 4.03 (L)  4.14 - 5.80 10*6/mm3 Final    Hemoglobin 12/08/2023 12.2 (L)  13.0 - 17.7 g/dL Final    Hematocrit 12/08/2023 36.0 (L)  37.5 - 51.0 % Final    MCV 12/08/2023 89.3  79.0 - 97.0 fL Final    MCH 12/08/2023 30.3  26.6 - 33.0 pg Final    MCHC 12/08/2023 33.9  31.5 - 35.7 g/dL Final    RDW 12/08/2023 13.7  12.3 - 15.4 % Final    RDW-SD 12/08/2023 45.3  37.0 - 54.0 fl Final    MPV 12/08/2023 10.6  6.0 - 12.0 fL Final    Platelets 12/08/2023 44 (L)  140 - 450 10*3/mm3 Final    Neutrophil % 12/08/2023 61.2  42.7 - 76.0 % Final    Lymphocyte % 12/08/2023 31.4  19.6 - 45.3 % Final    Monocyte % 12/08/2023 5.9  5.0 - 12.0 % Final    Eosinophil % 12/08/2023 0.7  0.3 - 6.2 % Final    Basophil % 12/08/2023 0.7  0.0 - 1.5 % Final    Immature Grans % 12/08/2023 0.1  0.0 - 0.5 % Final    Neutrophils, Absolute 12/08/2023 4.63  1.70 - 7.00 10*3/mm3 Final    Lymphocytes, Absolute 12/08/2023 2.38  0.70 - 3.10 10*3/mm3 Final    Monocytes, Absolute 12/08/2023 0.45  0.10 - 0.90 10*3/mm3 Final    Eosinophils, " Absolute 12/08/2023 0.05  0.00 - 0.40 10*3/mm3 Final    Basophils, Absolute 12/08/2023 0.05  0.00 - 0.20 10*3/mm3 Final    Immature Grans, Absolute 12/08/2023 0.01  0.00 - 0.05 10*3/mm3 Final       No results found.    Assessment / Plan      Assessment/Plan:   1. Chronic ITP (idiopathic thrombocytopenia) (HCC) (Primary)  -Diagnosed about 6-7 years ago and was previously followed by Dr. Duggan in [place]  -Has previously been steroid responsive but is not been on any treatment over the past several years  -Most recent platelet count 34K in January 2023  -Platelet count 35K in June 2023  -Platelet count 44K in December 2023  -No indication for treatment at this time.  Should his platelet count drop below 30K, will consider pulse dose steroids  -Plan to repeat a CBC in 6 months.  Ordered today     2.  Iron deficiency anemia  -Iron studies stable on oral iron         Follow Up:   Follow-up in 6 months     Garrick Cowan MD  Hematology and Oncology     Please note that portions of this note may have been completed with a voice recognition program. Efforts were made to edit the dictations, but occasionally words are mistranscribed.

## 2023-12-13 ENCOUNTER — TELEPHONE (OUTPATIENT)
Dept: FAMILY MEDICINE CLINIC | Facility: CLINIC | Age: 63
End: 2023-12-13
Payer: MEDICARE

## 2023-12-13 DIAGNOSIS — D50.9 IRON DEFICIENCY ANEMIA, UNSPECIFIED IRON DEFICIENCY ANEMIA TYPE: ICD-10-CM

## 2023-12-13 NOTE — TELEPHONE ENCOUNTER
HUB TO READ:     Left vm on patients primary phone number listed explaining that provider will be out of office on originally schedule appointment 1/26/24 and we will need to reschedule.

## 2023-12-13 NOTE — TELEPHONE ENCOUNTER
Name: Huang Almonte    Relationship: Self    Best Callback Number: [phone]     HUB PROVIDED THE RELAY MESSAGE FROM THE OFFICE   PATIENT SCHEDULED AS REQUESTED    ADDITIONAL INFORMATION: PATIENT SCHEDULED FOR 1/31/24.

## 2023-12-13 NOTE — TELEPHONE ENCOUNTER
Caller: Huang Almonte    Relationship: Self    Best call back number: [phone]    Requested Prescriptions:   Requested Prescriptions     Pending Prescriptions Disp Refills    Ferrous Sulfate ER 50 MG tablet controlled-release 60 tablet 0     Sig: Take 1 tablet by mouth Daily.        Pharmacy where request should be sent: [facility] [id number] [address] [phone]  - [phone] FX     Last office visit with prescribing clinician: 7/25/2023   Last telemedicine visit with prescribing clinician: Visit date not found   Next office visit with prescribing clinician: 1/26/2024     Additional details provided by patient: THE PATIENT HAS BEEN OF MEDICATION FOR A FEW DAYS    Does the patient have less than a 3 day supply:  [x] Yes  [] No    Would you like a call back once the refill request has been completed: [] Yes [x] No    If the office needs to give you a call back, can they leave a voicemail: [] Yes [x] No    Missy Collins Rep   12/13/23 10:44 EST

## 2024-01-21 DIAGNOSIS — G47.09 OTHER INSOMNIA: ICD-10-CM

## 2024-01-22 RX ORDER — ZOLPIDEM TARTRATE 12.5 MG/1
12.5 TABLET, FILM COATED, EXTENDED RELEASE ORAL NIGHTLY PRN
Qty: 30 TABLET | Refills: 0 | Status: SHIPPED | OUTPATIENT
Start: 2024-01-22

## 2024-02-05 ENCOUNTER — OFFICE VISIT (OUTPATIENT)
Dept: FAMILY MEDICINE CLINIC | Facility: CLINIC | Age: 64
End: 2024-02-05
Payer: MEDICARE

## 2024-02-05 VITALS
OXYGEN SATURATION: 98 % | HEART RATE: 81 BPM | BODY MASS INDEX: 26.52 KG/M2 | SYSTOLIC BLOOD PRESSURE: 118 MMHG | HEIGHT: 66 IN | RESPIRATION RATE: 16 BRPM | TEMPERATURE: 97.8 F | WEIGHT: 165 LBS | DIASTOLIC BLOOD PRESSURE: 68 MMHG

## 2024-02-05 DIAGNOSIS — D69.3 CHRONIC ITP (IDIOPATHIC THROMBOCYTOPENIA): ICD-10-CM

## 2024-02-05 DIAGNOSIS — G47.09 OTHER INSOMNIA: ICD-10-CM

## 2024-02-05 DIAGNOSIS — I10 ESSENTIAL HYPERTENSION: ICD-10-CM

## 2024-02-05 DIAGNOSIS — D50.9 IRON DEFICIENCY ANEMIA, UNSPECIFIED IRON DEFICIENCY ANEMIA TYPE: ICD-10-CM

## 2024-02-05 DIAGNOSIS — E78.00 HYPERCHOLESTEREMIA: ICD-10-CM

## 2024-02-05 DIAGNOSIS — Z00.00 MEDICARE ANNUAL WELLNESS VISIT, SUBSEQUENT: Primary | ICD-10-CM

## 2024-02-05 PROCEDURE — 99214 OFFICE O/P EST MOD 30 MIN: CPT | Performed by: FAMILY MEDICINE

## 2024-02-05 PROCEDURE — 1159F MED LIST DOCD IN RCRD: CPT | Performed by: FAMILY MEDICINE

## 2024-02-05 PROCEDURE — 3074F SYST BP LT 130 MM HG: CPT | Performed by: FAMILY MEDICINE

## 2024-02-05 PROCEDURE — 1170F FXNL STATUS ASSESSED: CPT | Performed by: FAMILY MEDICINE

## 2024-02-05 PROCEDURE — 3078F DIAST BP <80 MM HG: CPT | Performed by: FAMILY MEDICINE

## 2024-02-05 PROCEDURE — 1160F RVW MEDS BY RX/DR IN RCRD: CPT | Performed by: FAMILY MEDICINE

## 2024-02-05 PROCEDURE — G0439 PPPS, SUBSEQ VISIT: HCPCS | Performed by: FAMILY MEDICINE

## 2024-02-05 RX ORDER — LISINOPRIL AND HYDROCHLOROTHIAZIDE 20; 12.5 MG/1; MG/1
1 TABLET ORAL DAILY
Qty: 90 TABLET | Refills: 1 | Status: SHIPPED | OUTPATIENT
Start: 2024-02-05

## 2024-02-05 RX ORDER — ATORVASTATIN CALCIUM 20 MG/1
20 TABLET, FILM COATED ORAL DAILY
Qty: 90 TABLET | Refills: 0 | Status: SHIPPED | OUTPATIENT
Start: 2024-02-05

## 2024-02-05 RX ORDER — ZOLPIDEM TARTRATE 12.5 MG/1
12.5 TABLET, FILM COATED, EXTENDED RELEASE ORAL NIGHTLY PRN
Qty: 30 TABLET | Refills: 5 | Status: SHIPPED | OUTPATIENT
Start: 2024-02-05

## 2024-02-05 NOTE — PROGRESS NOTES
The ABCs of the Annual Wellness Visit  Subsequent Medicare Wellness Visit    Subjective    Huang Almonte is a 63 y.o. male who presents for a Subsequent Medicare Wellness Visit.    The following portions of the patient's history were reviewed and   updated as appropriate: allergies, current medications, past family history, past medical history, past social history, past surgical history, and problem list.    Compared to one year ago, the patient feels his physical   health is the same.    Compared to one year ago, the patient feels his mental   health is the same.    Recent Hospitalizations:  He was not admitted to the hospital during the last year.       Current Medical Providers:  Patient Care Team:  Jeremías Brar MD as PCP - General    Outpatient Medications Prior to Visit   Medication Sig Dispense Refill    gabapentin (NEURONTIN) 400 MG capsule Gabapentin 400 MG Oral Capsule; Patient Sig: Gabapentin 400 MG Oral Capsule ; 90; 0; 07-May-2015; Active      Omega-3 Fatty Acids (Omega-3 Fish Oil) 500 MG capsule Take 1 capsule by mouth Daily.      oxyCODONE-acetaminophen (PERCOCET) 7.5-325 MG per tablet       atenolol (TENORMIN) 50 MG tablet TAKE ONE TABLET BY MOUTH DAILY 30 tablet 5    atorvastatin (LIPITOR) 20 MG tablet TAKE ONE TABLET BY MOUTH DAILY 90 tablet 0    Ferrous Sulfate ER 50 MG tablet controlled-release Take 1 tablet by mouth Daily. 60 tablet 1    lisinopril-hydrochlorothiazide (PRINZIDE,ZESTORETIC) 20-12.5 MG per tablet Take 1 tablet by mouth Daily. 30 tablet 5    zolpidem CR (AMBIEN CR) 12.5 MG CR tablet TAKE ONE TABLET BY MOUTH ONCE NIGHTLY AS NEEDED FOR SLEEP 30 tablet 0     No facility-administered medications prior to visit.       Opioid medication/s are on active medication list.  and I have evaluated his active treatment plan and pain score trends (see table).  There were no vitals filed for this visit.  I have reviewed the chart for potential of high risk medication and harmful drug  "interactions in the elderly.            Patient Active Problem List   Diagnosis    Back pain    Generalized anxiety disorder    Essential hypertriglyceridemia    Current every day smoker    Insomnia    Chronic ITP (idiopathic thrombocytopenia)    Hypogonadism in male    Essential hypertension     Advance Care Planning   Advance Care Planning     Advance Directive is not on file.  ACP discussion was held with the patient during this visit. Patient does not have an advance directive, information provided.     Objective    Vitals:    24 1214   BP: 118/68   Pulse: 81   Resp: 16   Temp: 97.8 °F (36.6 °C)   SpO2: 98%   Weight: 74.8 kg (165 lb)   Height: 167.6 cm (65.98\")     Estimated body mass index is 26.65 kg/m² as calculated from the following:    Height as of this encounter: 167.6 cm (65.98\").    Weight as of this encounter: 74.8 kg (165 lb).        Does the patient have evidence of cognitive impairment? No          HEALTH RISK ASSESSMENT    Smoking Status:  Social History     Tobacco Use   Smoking Status Former    Packs/day: 1.00    Years: 40.00    Additional pack years: 0.00    Total pack years: 40.00    Types: Cigarettes    Quit date: 2019    Years since quittin.0   Smokeless Tobacco Never     Alcohol Consumption:  Social History     Substance and Sexual Activity   Alcohol Use Yes    Comment: trying to quit     Fall Risk Screen:    PEEWEE Fall Risk Assessment was completed, and patient is at MODERATE risk for falls. Assessment completed on:2024    Depression Screenin/5/2024    12:21 PM   PHQ-2/PHQ-9 Depression Screening   Little Interest or Pleasure in Doing Things 0-->not at all   Feeling Down, Depressed or Hopeless 1-->several days   PHQ-9: Brief Depression Severity Measure Score 1       Health Habits and Functional and Cognitive Screenin/5/2024    12:00 PM   Functional & Cognitive Status   Do you have difficulty preparing food and eating? No   Do you have difficulty bathing " yourself, getting dressed or grooming yourself? No   Do you have difficulty using the toilet? No   Do you have difficulty moving around from place to place? No   Do you have trouble with steps or getting out of a bed or a chair? No   Current Diet Well Balanced Diet   Dental Exam Up to date   Eye Exam Not up to date   Exercise (times per week) 5 times per week   Current Exercises Include Walking   Do you need help using the phone?  No   Are you deaf or do you have serious difficulty hearing?  No   Do you need help to go to places out of walking distance? No   Do you need help shopping? No   Do you need help preparing meals?  No   Do you need help with housework?  No   Do you need help with laundry? No   Do you need help taking your medications? No   Do you need help managing money? No   Do you ever drive or ride in a car without wearing a seat belt? No   Have you felt unusual stress, anger or loneliness in the last month? No   Who do you live with? Alone   If you need help, do you have trouble finding someone available to you? No   Have you been bothered in the last four weeks by sexual problems? No   Do you have difficulty concentrating, remembering or making decisions? No       Age-appropriate Screening Schedule:  Refer to the list below for future screening recommendations based on patient's age, sex and/or medical conditions. Orders for these recommended tests are listed in the plan section. The patient has been provided with a written plan.    Health Maintenance   Topic Date Due    BMI FOLLOWUP  Never done    ZOSTER VACCINE (1 of 2) Never done    ANNUAL WELLNESS VISIT  08/24/2023    COVID-19 Vaccine (3 - 2023-24 season) 09/01/2023    LIPID PANEL  07/25/2024    LUNG CANCER SCREENING  09/22/2024    COLORECTAL CANCER SCREENING  06/22/2026    TDAP/TD VACCINES (2 - Td or Tdap) 10/16/2028    HEPATITIS C SCREENING  Completed    INFLUENZA VACCINE  Completed    Pneumococcal Vaccine 0-64  Aged Out                  CMS  "Preventative Services Quick Reference  Risk Factors Identified During Encounter  Fall Risk-High or Moderate: Discussed Fall Prevention in the home  Inactivity/Sedentary: Patient was advised to exercise at least 150 minutes a week per CDC recommendations.  Polypharmacy: Medication List reviewed  The above risks/problems have been discussed with the patient.  Pertinent information has been shared with the patient in the After Visit Summary.  An After Visit Summary and PPPS were made available to the patient.    Follow Up:   Next Medicare Wellness visit to be scheduled in 1 year.       Additional E&M Note during same encounter follows:  Patient has multiple medical problems which are significant and separately identifiable that require additional work above and beyond the Medicare Wellness Visit.      Chief Complaint  Hypertension    Subjective        Hypertension      Huang Almonte is also being seen today for HTN, cholesterol    Huang Almonte  is here for follow-up of hypertension of several years duration. He is exercising and is not adherent to a low-salt diet. Patient does not check his blood pressure.    He is compliant with meds.        Huang Almonte returns today for follow up of Hyperlipidemia  Huang indicates his exercise level as walking his dog.  Diet: trying to eat healthy  Patient is compliant with medications   Any side effects to medications:   chest pain No myalgia No memory change No  Pt is due for labs    Sleep doing well with ambien  No SE noted with medicine  Refills needed      Review of Systems   Constitutional: Negative.    Respiratory: Negative.     Cardiovascular: Negative.    Psychiatric/Behavioral: Negative.         Objective   Vital Signs:  /68   Pulse 81   Temp 97.8 °F (36.6 °C)   Resp 16   Ht 167.6 cm (65.98\")   Wt 74.8 kg (165 lb)   SpO2 98%   BMI 26.65 kg/m²     Physical Exam  Vitals and nursing note reviewed.   Constitutional:       General: He is not in acute distress.     " Appearance: Normal appearance. He is well-developed.   Cardiovascular:      Rate and Rhythm: Normal rate and regular rhythm.      Heart sounds: Normal heart sounds.   Pulmonary:      Effort: Pulmonary effort is normal.      Breath sounds: Normal breath sounds.   Neurological:      Mental Status: He is alert and oriented to person, place, and time.   Psychiatric:         Mood and Affect: Mood normal.         Behavior: Behavior normal.         Thought Content: Thought content normal.         Judgment: Judgment normal.                   Assessment and Plan   Diagnoses and all orders for this visit:    1. Medicare annual wellness visit, subsequent (Primary)    2. Essential hypertension  -     lisinopril-hydrochlorothiazide (PRINZIDE,ZESTORETIC) 20-12.5 MG per tablet; Take 1 tablet by mouth Daily.  Dispense: 90 tablet; Refill: 1  -     CBC & Differential  -     Comprehensive Metabolic Panel    3. Hypercholesteremia  -     atorvastatin (LIPITOR) 20 MG tablet; Take 1 tablet by mouth Daily.  Dispense: 90 tablet; Refill: 0  -     Comprehensive Metabolic Panel  -     Lipid Panel    4. Iron deficiency anemia, unspecified iron deficiency anemia type  -     CBC & Differential  -     Iron Profile  -     Ferrous Sulfate ER 50 MG tablet controlled-release; Take 1 tablet by mouth Daily.  Dispense: 90 tablet; Refill: 1    5. Chronic ITP (idiopathic thrombocytopenia)  -     CBC & Differential    6. Other insomnia  -     zolpidem CR (AMBIEN CR) 12.5 MG CR tablet; Take 1 tablet by mouth At Night As Needed for Sleep.  Dispense: 30 tablet; Refill: 5    Medicare wellness completed.  Pt overall doing ok.  Counsling as above    BP stable, no change in regimen  Will recheck lipids and continue lipitor  Ambien working well for sleep, will continue this, YESY iverson

## 2024-02-06 LAB
ALBUMIN SERPL-MCNC: 4.4 G/DL (ref 3.9–4.9)
ALBUMIN/GLOB SERPL: 1.7 {RATIO} (ref 1.2–2.2)
ALP SERPL-CCNC: 78 IU/L (ref 44–121)
ALT SERPL-CCNC: 17 IU/L (ref 0–44)
AST SERPL-CCNC: 20 IU/L (ref 0–40)
BASOPHILS # BLD AUTO: 0.1 X10E3/UL (ref 0–0.2)
BASOPHILS NFR BLD AUTO: 1 %
BILIRUB SERPL-MCNC: 0.4 MG/DL (ref 0–1.2)
BUN SERPL-MCNC: 27 MG/DL (ref 8–27)
BUN/CREAT SERPL: 17 (ref 10–24)
CALCIUM SERPL-MCNC: 9.7 MG/DL (ref 8.6–10.2)
CHLORIDE SERPL-SCNC: 103 MMOL/L (ref 96–106)
CHOLEST SERPL-MCNC: 160 MG/DL (ref 100–199)
CO2 SERPL-SCNC: 25 MMOL/L (ref 20–29)
CREAT SERPL-MCNC: 1.6 MG/DL (ref 0.76–1.27)
EGFRCR SERPLBLD CKD-EPI 2021: 48 ML/MIN/1.73
EOSINOPHIL # BLD AUTO: 0.1 X10E3/UL (ref 0–0.4)
EOSINOPHIL NFR BLD AUTO: 1 %
ERYTHROCYTE [DISTWIDTH] IN BLOOD BY AUTOMATED COUNT: 13.1 % (ref 11.6–15.4)
GLOBULIN SER CALC-MCNC: 2.6 G/DL (ref 1.5–4.5)
GLUCOSE SERPL-MCNC: 81 MG/DL (ref 70–99)
HCT VFR BLD AUTO: 35.6 % (ref 37.5–51)
HDLC SERPL-MCNC: 37 MG/DL
HGB BLD-MCNC: 11.9 G/DL (ref 13–17.7)
IMM GRANULOCYTES # BLD AUTO: 0 X10E3/UL (ref 0–0.1)
IMM GRANULOCYTES NFR BLD AUTO: 0 %
IRON SATN MFR SERPL: 40 % (ref 15–55)
IRON SERPL-MCNC: 112 UG/DL (ref 38–169)
LDLC SERPL CALC-MCNC: 85 MG/DL (ref 0–99)
LYMPHOCYTES # BLD AUTO: 2.3 X10E3/UL (ref 0.7–3.1)
LYMPHOCYTES NFR BLD AUTO: 31 %
MCH RBC QN AUTO: 30.5 PG (ref 26.6–33)
MCHC RBC AUTO-ENTMCNC: 33.4 G/DL (ref 31.5–35.7)
MCV RBC AUTO: 91 FL (ref 79–97)
MONOCYTES # BLD AUTO: 0.5 X10E3/UL (ref 0.1–0.9)
MONOCYTES NFR BLD AUTO: 6 %
MORPHOLOGY BLD-IMP: ABNORMAL
NEUTROPHILS # BLD AUTO: 4.4 X10E3/UL (ref 1.4–7)
NEUTROPHILS NFR BLD AUTO: 61 %
PLATELET # BLD AUTO: 33 X10E3/UL (ref 150–450)
POTASSIUM SERPL-SCNC: 4.4 MMOL/L (ref 3.5–5.2)
PROT SERPL-MCNC: 7 G/DL (ref 6–8.5)
RBC # BLD AUTO: 3.9 X10E6/UL (ref 4.14–5.8)
SODIUM SERPL-SCNC: 139 MMOL/L (ref 134–144)
TIBC SERPL-MCNC: 280 UG/DL (ref 250–450)
TRIGL SERPL-MCNC: 229 MG/DL (ref 0–149)
UIBC SERPL-MCNC: 168 UG/DL (ref 111–343)
VLDLC SERPL CALC-MCNC: 38 MG/DL (ref 5–40)
WBC # BLD AUTO: 7.3 X10E3/UL (ref 3.4–10.8)

## 2024-02-14 ENCOUNTER — TELEPHONE (OUTPATIENT)
Dept: FAMILY MEDICINE CLINIC | Facility: CLINIC | Age: 64
End: 2024-02-14
Payer: MEDICARE

## 2024-02-20 DIAGNOSIS — G47.09 OTHER INSOMNIA: ICD-10-CM

## 2024-02-20 RX ORDER — ZOLPIDEM TARTRATE 12.5 MG/1
12.5 TABLET, FILM COATED, EXTENDED RELEASE ORAL NIGHTLY PRN
Qty: 30 TABLET | Refills: 5 | Status: CANCELLED | OUTPATIENT
Start: 2024-02-20

## 2024-02-20 NOTE — TELEPHONE ENCOUNTER
Caller: Huang Almonte    Relationship: Self    Best call back number: [phone]     Requested Prescriptions:   Requested Prescriptions     Pending Prescriptions Disp Refills    zolpidem CR (AMBIEN CR) 12.5 MG CR tablet 30 tablet 5     Sig: Take 1 tablet by mouth At Night As Needed for Sleep.        Pharmacy where request should be sent: [facility] [id number] [address] [phone] [facility] [phone] FX     Last office visit with prescribing clinician: 2/5/2024   Last telemedicine visit with prescribing clinician: Visit date not found   Next office visit with prescribing clinician: 8/5/2024     Additional details provided by patient:     Does the patient have less than a 3 day supply:  [x] Yes  [] No    Would you like a call back once the refill request has been completed: [] Yes [x] No    If the office needs to give you a call back, can they leave a voicemail: [] Yes [x] No    Missy Nogueira Rep   02/20/24 10:24 EST

## 2024-06-07 ENCOUNTER — OFFICE VISIT (OUTPATIENT)
Dept: ONCOLOGY | Facility: CLINIC | Age: 64
End: 2024-06-07
Payer: MEDICARE

## 2024-06-07 ENCOUNTER — LAB (OUTPATIENT)
Dept: LAB | Facility: HOSPITAL | Age: 64
End: 2024-06-07
Payer: MEDICARE

## 2024-06-07 VITALS
DIASTOLIC BLOOD PRESSURE: 88 MMHG | OXYGEN SATURATION: 98 % | TEMPERATURE: 97.7 F | SYSTOLIC BLOOD PRESSURE: 135 MMHG | WEIGHT: 160 LBS | RESPIRATION RATE: 16 BRPM | HEART RATE: 76 BPM | HEIGHT: 66 IN | BODY MASS INDEX: 25.71 KG/M2

## 2024-06-07 DIAGNOSIS — D69.3 CHRONIC ITP (IDIOPATHIC THROMBOCYTOPENIA): Primary | ICD-10-CM

## 2024-06-07 DIAGNOSIS — D69.3 CHRONIC ITP (IDIOPATHIC THROMBOCYTOPENIA): ICD-10-CM

## 2024-06-07 LAB
BASOPHILS # BLD AUTO: 0.04 10*3/MM3 (ref 0–0.2)
BASOPHILS NFR BLD AUTO: 0.5 % (ref 0–1.5)
DEPRECATED RDW RBC AUTO: 39.7 FL (ref 37–54)
EOSINOPHIL # BLD AUTO: 0.09 10*3/MM3 (ref 0–0.4)
EOSINOPHIL NFR BLD AUTO: 1.1 % (ref 0.3–6.2)
ERYTHROCYTE [DISTWIDTH] IN BLOOD BY AUTOMATED COUNT: 12.3 % (ref 12.3–15.4)
HCT VFR BLD AUTO: 36.9 % (ref 37.5–51)
HGB BLD-MCNC: 12.9 G/DL (ref 13–17.7)
IMM GRANULOCYTES # BLD AUTO: 0.01 10*3/MM3 (ref 0–0.05)
IMM GRANULOCYTES NFR BLD AUTO: 0.1 % (ref 0–0.5)
LYMPHOCYTES # BLD AUTO: 2.37 10*3/MM3 (ref 0.7–3.1)
LYMPHOCYTES NFR BLD AUTO: 30 % (ref 19.6–45.3)
MCH RBC QN AUTO: 30.8 PG (ref 26.6–33)
MCHC RBC AUTO-ENTMCNC: 35 G/DL (ref 31.5–35.7)
MCV RBC AUTO: 88.1 FL (ref 79–97)
MONOCYTES # BLD AUTO: 0.52 10*3/MM3 (ref 0.1–0.9)
MONOCYTES NFR BLD AUTO: 6.6 % (ref 5–12)
NEUTROPHILS NFR BLD AUTO: 4.88 10*3/MM3 (ref 1.7–7)
NEUTROPHILS NFR BLD AUTO: 61.7 % (ref 42.7–76)
PLATELET # BLD AUTO: 39 10*3/MM3 (ref 140–450)
PMV BLD AUTO: 10.5 FL (ref 6–12)
RBC # BLD AUTO: 4.19 10*6/MM3 (ref 4.14–5.8)
WBC NRBC COR # BLD AUTO: 7.91 10*3/MM3 (ref 3.4–10.8)

## 2024-06-07 PROCEDURE — 3079F DIAST BP 80-89 MM HG: CPT | Performed by: INTERNAL MEDICINE

## 2024-06-07 PROCEDURE — 85025 COMPLETE CBC W/AUTO DIFF WBC: CPT

## 2024-06-07 PROCEDURE — 36415 COLL VENOUS BLD VENIPUNCTURE: CPT

## 2024-06-07 PROCEDURE — 99214 OFFICE O/P EST MOD 30 MIN: CPT | Performed by: INTERNAL MEDICINE

## 2024-06-07 PROCEDURE — 3075F SYST BP GE 130 - 139MM HG: CPT | Performed by: INTERNAL MEDICINE

## 2024-06-07 PROCEDURE — 1126F AMNT PAIN NOTED NONE PRSNT: CPT | Performed by: INTERNAL MEDICINE

## 2024-06-07 NOTE — PROGRESS NOTES
Follow Up Office Visit      Date: 2024     Patient Name: Huang Almonte  MRN: [id number]  : 1960  Referring Physician: Jeremías Brar     Chief Complaint:  Follow-up for ITP     History of Present Illness: Huang Almonte is a pleasant 62 y.o. male with past medical history of ITP, hypertension, hyperlipidemia, iron deficiency anemia who presents today for evaluation of ITP. The patient was diagnosed with ITP about 6-7 years ago.  He initially presented to Dr. Duggan in [place] with a platelet count of 100K.  Initial work-up was negative for HIV, B12, folate deficiency and a normal TSH.  Ultrasound of the liver and spleen were normal.  He was started on prednisone with improvement of his platelet count.  Upon steroid taper his platelets dropped to 34 K.  He was then started on prednisone 5 mg every other day with stabilization of his platelets to around 70K- 100K.  Patient was then lost to follow-up due to financial toxicity and insurance issues.  He was managed by his PCP with stable platelet count around 70K-100K until this past year when his platelet counts have slowly trended down to 34K most recently in 2023.  He notes some mild easy bruising but denies any significant petechiae.  Denies any excessive bleeding episodes.  Denies any unexplained fevers, chills, night sweats, weight loss      Interval History:  Presents to clinic for follow-up.  No major issues today.  Denies any easy bleeding or bruising episodes.  Denies any petechiae    Oncology History:    Oncology/Hematology History    No history exists.       Subjective      Review of Systems:   Constitutional: Negative for fevers, chills, or weight loss  Eyes: Negative for blurred vision or discharge         Ear/Nose/Throat: Negative for difficulty swallowing, sore throat, LAD                                                       Respiratory: Negative for cough, SOA, wheezing                                                                                         Cardiovascular: Negative for chest pain or palpitations                                                                  Gastrointestinal: Negative for nausea, vomiting or diarrhea                                                                     Genitourinary: Negative for dysuria or hematuria                                                                                           Musculoskeletal: Negative for any joint pains or muscle aches                                                                        Neurologic: Negative for any weakness, headaches, dizziness                                                                         Hematologic: Negative for any easy bleeding or bruising                                                                                   Psychiatric: Negative for anxiety or depression                          Past Medical History/Past Surgical History/ Family History/ Social History: Reviewed by me and unchanged from my previous documentation done on December 2023.     Medications:     Current Outpatient Medications:     atorvastatin (LIPITOR) 20 MG tablet, Take 1 tablet by mouth Daily., Disp: 90 tablet, Rfl: 0    Ferrous Sulfate ER 50 MG tablet controlled-release, Take 1 tablet by mouth Daily., Disp: 90 tablet, Rfl: 1    gabapentin (NEURONTIN) 400 MG capsule, Gabapentin 400 MG Oral Capsule; Patient Sig: Gabapentin 400 MG Oral Capsule ; 90; 0; 07-May-2015; Active, Disp: , Rfl:     lisinopril-hydrochlorothiazide (PRINZIDE,ZESTORETIC) 20-12.5 MG per tablet, Take 1 tablet by mouth Daily., Disp: 90 tablet, Rfl: 1    Omega-3 Fatty Acids (Omega-3 Fish Oil) 500 MG capsule, Take 1 capsule by mouth Daily., Disp: , Rfl:     oxyCODONE-acetaminophen (PERCOCET) 7.5-325 MG per tablet, , Disp: , Rfl:     zolpidem CR (AMBIEN CR) 12.5 MG CR tablet, Take 1 tablet by mouth At Night As Needed for Sleep., Disp: 30 tablet, Rfl: 5    Allergies:   No Known  "Allergies    Objective     Physical Exam:  Vital Signs:   Vitals:    06/07/24 1417   BP: 135/88  Comment: LUE   Pulse: 76   Resp: 16   Temp: 97.7 °F (36.5 °C)   TempSrc: Infrared   SpO2: 98%  Comment: RA   Weight: 72.6 kg (160 lb)   Height: 167.6 cm (66\")   PainSc: 0-No pain     Pain Score    06/07/24 1417   PainSc: 0-No pain     ECOG Performance Status: 0 - Asymptomatic    Constitutional: NAD, ECOG 0  Eyes: PERRLA, scleral anicteric  ENT: No LAD, no thyromegaly  Respiratory: CTAB, no wheezing, rales, rhonchi  Cardiovascular: RRR, no murmurs, pulses 2+ bilaterally  Abdomen: soft, NT/ND, no HSM  Musculoskeletal: strength 5/5 bilaterally, no c/c/e  Neurologic: A&O x 3, CN II-XII intact grossly    Results Review:   Lab on 06/07/2024   Component Date Value Ref Range Status    WBC 06/07/2024 7.91  3.40 - 10.80 10*3/mm3 Final    RBC 06/07/2024 4.19  4.14 - 5.80 10*6/mm3 Final    Hemoglobin 06/07/2024 12.9 (L)  13.0 - 17.7 g/dL Final    Hematocrit 06/07/2024 36.9 (L)  37.5 - 51.0 % Final    MCV 06/07/2024 88.1  79.0 - 97.0 fL Final    MCH 06/07/2024 30.8  26.6 - 33.0 pg Final    MCHC 06/07/2024 35.0  31.5 - 35.7 g/dL Final    RDW 06/07/2024 12.3  12.3 - 15.4 % Final    RDW-SD 06/07/2024 39.7  37.0 - 54.0 fl Final    MPV 06/07/2024 10.5  6.0 - 12.0 fL Final    Platelets 06/07/2024 39 (L)  140 - 450 10*3/mm3 Final    Neutrophil % 06/07/2024 61.7  42.7 - 76.0 % Final    Lymphocyte % 06/07/2024 30.0  19.6 - 45.3 % Final    Monocyte % 06/07/2024 6.6  5.0 - 12.0 % Final    Eosinophil % 06/07/2024 1.1  0.3 - 6.2 % Final    Basophil % 06/07/2024 0.5  0.0 - 1.5 % Final    Immature Grans % 06/07/2024 0.1  0.0 - 0.5 % Final    Neutrophils, Absolute 06/07/2024 4.88  1.70 - 7.00 10*3/mm3 Final    Lymphocytes, Absolute 06/07/2024 2.37  0.70 - 3.10 10*3/mm3 Final    Monocytes, Absolute 06/07/2024 0.52  0.10 - 0.90 10*3/mm3 Final    Eosinophils, Absolute 06/07/2024 0.09  0.00 - 0.40 10*3/mm3 Final    Basophils, Absolute 06/07/2024 0.04  " 0.00 - 0.20 10*3/mm3 Final    Immature Grans, Absolute 06/07/2024 0.01  0.00 - 0.05 10*3/mm3 Final       No results found.    Assessment / Plan      Assessment/Plan:   1. Chronic ITP (idiopathic thrombocytopenia) (HCC) (Primary)  -Diagnosed about 6-7 years ago and was previously followed by Dr. Duggan in [place]  -Has previously been steroid responsive but is not been on any treatment over the past several years  -Most recent platelet count 34K in January 2023  -Platelet count 35K in June 2023  -Platelet count 44K in December 2023  -Platelet count 39K in June 2024  -No indication for treatment at this time.  Should his platelet count drop below 30K, will consider pulse dose steroids  -Plan to repeat a CBC in 6 months.  Ordered today     2.  Iron deficiency anemia  -Iron studies stable on oral iron         Follow Up:   Follow-up in 6 months     Garrick Cowan MD  Hematology and Oncology     Please note that portions of this note may have been completed with a voice recognition program. Efforts were made to edit the dictations, but occasionally words are mistranscribed.

## 2024-07-03 ENCOUNTER — OFFICE VISIT (OUTPATIENT)
Dept: FAMILY MEDICINE CLINIC | Facility: CLINIC | Age: 64
End: 2024-07-03
Payer: MEDICARE

## 2024-07-03 VITALS
HEIGHT: 66 IN | BODY MASS INDEX: 26.2 KG/M2 | HEART RATE: 94 BPM | DIASTOLIC BLOOD PRESSURE: 80 MMHG | OXYGEN SATURATION: 98 % | TEMPERATURE: 97.8 F | WEIGHT: 163 LBS | RESPIRATION RATE: 16 BRPM | SYSTOLIC BLOOD PRESSURE: 140 MMHG

## 2024-07-03 DIAGNOSIS — Z12.5 SCREENING FOR PROSTATE CANCER: ICD-10-CM

## 2024-07-03 DIAGNOSIS — D50.9 IRON DEFICIENCY ANEMIA, UNSPECIFIED IRON DEFICIENCY ANEMIA TYPE: ICD-10-CM

## 2024-07-03 DIAGNOSIS — R53.82 CHRONIC FATIGUE: ICD-10-CM

## 2024-07-03 DIAGNOSIS — I10 ESSENTIAL HYPERTENSION: ICD-10-CM

## 2024-07-03 DIAGNOSIS — R53.1 RIGHT SIDED WEAKNESS: ICD-10-CM

## 2024-07-03 DIAGNOSIS — G47.09 OTHER INSOMNIA: ICD-10-CM

## 2024-07-03 DIAGNOSIS — R42 DIZZINESS: Primary | ICD-10-CM

## 2024-07-03 DIAGNOSIS — R42 LIGHT HEADED: ICD-10-CM

## 2024-07-03 PROCEDURE — 3077F SYST BP >= 140 MM HG: CPT | Performed by: FAMILY MEDICINE

## 2024-07-03 PROCEDURE — 1126F AMNT PAIN NOTED NONE PRSNT: CPT | Performed by: FAMILY MEDICINE

## 2024-07-03 PROCEDURE — 1159F MED LIST DOCD IN RCRD: CPT | Performed by: FAMILY MEDICINE

## 2024-07-03 PROCEDURE — 99214 OFFICE O/P EST MOD 30 MIN: CPT | Performed by: FAMILY MEDICINE

## 2024-07-03 PROCEDURE — G2211 COMPLEX E/M VISIT ADD ON: HCPCS | Performed by: FAMILY MEDICINE

## 2024-07-03 PROCEDURE — 1160F RVW MEDS BY RX/DR IN RCRD: CPT | Performed by: FAMILY MEDICINE

## 2024-07-03 PROCEDURE — 3079F DIAST BP 80-89 MM HG: CPT | Performed by: FAMILY MEDICINE

## 2024-07-03 RX ORDER — ATORVASTATIN CALCIUM 20 MG/1
20 TABLET, FILM COATED ORAL DAILY
Qty: 90 TABLET | Refills: 0 | Status: SHIPPED | OUTPATIENT
Start: 2024-07-03 | End: 2024-07-03

## 2024-07-03 RX ORDER — LISINOPRIL AND HYDROCHLOROTHIAZIDE 20; 12.5 MG/1; MG/1
1 TABLET ORAL DAILY
Qty: 90 TABLET | Refills: 1 | Status: SHIPPED | OUTPATIENT
Start: 2024-07-03 | End: 2024-07-03

## 2024-07-03 RX ORDER — LISINOPRIL AND HYDROCHLOROTHIAZIDE 12.5; 1 MG/1; MG/1
1 TABLET ORAL DAILY
Qty: 90 TABLET | Refills: 1 | Status: SHIPPED | OUTPATIENT
Start: 2024-07-03

## 2024-07-03 NOTE — PROGRESS NOTES
Subjective   Huang Almonte is a 63 y.o. male.     History of Present Illness       He has been having episodes where he is feeling light headed and dizzy  His BP will be low with this and this is episodic  Nothing in particluar gaby it better nor worse  He brings in BP cuff with low, as well as high BP readings.  He is worried that he will fall due to the dizziness and as he lives alone this could cause problems      He has also had some shaking in hie RLE and RUE  Has been dropping things as well  Can have weakness on the R side  This is also rare.  He is not sure what would make it better nor worse      He notes with his back pain he would like a handicapped permit  This is getting harder to get to stores with him  He brings papers in with him      The following portions of the patient's history were reviewed and updated as appropriate: allergies, current medications, past family history, past medical history, past social history, past surgical history, and problem list.    Review of Systems   Respiratory: Negative.     Cardiovascular: Negative.  Negative for chest pain and palpitations.   Neurological:  Positive for dizziness, tremors and weakness.   Psychiatric/Behavioral: Negative.         Objective   Physical Exam  Vitals and nursing note reviewed.   Constitutional:       General: He is not in acute distress.     Appearance: Normal appearance. He is well-developed.   Cardiovascular:      Rate and Rhythm: Normal rate and regular rhythm.      Heart sounds: Normal heart sounds.   Pulmonary:      Effort: Pulmonary effort is normal.      Breath sounds: Normal breath sounds.   Neurological:      Mental Status: He is alert and oriented to person, place, and time.      Comments: Normal finger to nose B  Normal BU hand strength without weakness noted.   Psychiatric:         Mood and Affect: Mood normal.         Behavior: Behavior normal.         Thought Content: Thought content normal.         Judgment: Judgment normal.          Assessment & Plan   Diagnoses and all orders for this visit:    1. Dizziness (Primary)  -     MRI Brain With & Without Contrast; Future  -     CBC & Differential  -     Comprehensive Metabolic Panel  -     Magnesium  -     TSH    2. Light headed  -     MRI Brain With & Without Contrast; Future  -     CBC & Differential  -     Comprehensive Metabolic Panel  -     Magnesium  -     TSH    3. Right sided weakness  -     MRI Brain With & Without Contrast; Future    4. Essential hypertension  -     Discontinue: lisinopril-hydrochlorothiazide (PRINZIDE,ZESTORETIC) 20-12.5 MG per tablet; Take 1 tablet by mouth Daily.  Dispense: 90 tablet; Refill: 1  -     Cancel: CBC & Differential  -     Cancel: Comprehensive Metabolic Panel  -     Cancel: Uric Acid  -     lisinopril-hydrochlorothiazide (Zestoretic) 10-12.5 MG per tablet; Take 1 tablet by mouth Daily.  Dispense: 90 tablet; Refill: 1  -     CBC & Differential  -     Comprehensive Metabolic Panel  -     Magnesium    5. Iron deficiency anemia, unspecified iron deficiency anemia type  -     Ferrous Sulfate ER 50 MG tablet controlled-release; Take 1 tablet by mouth Daily.  Dispense: 90 tablet; Refill: 1  -     CBC & Differential    6. Other insomnia    7. Screening for prostate cancer  -     PSA Screen    8. Chronic fatigue  -     TSH    Other orders  -     Discontinue: atorvastatin (LIPITOR) 20 MG tablet; Take 1 tablet by mouth Daily.  Dispense: 90 tablet; Refill: 0  -     Cancel: Lipid Panel    Will lower prinzide form 20/12.5 to 10/12.5 and he will monitor BP at home and let us know if it is elevated in future    Will check MRI for weakness, dropping things and tremors he has noted on his R    F/u pending labs and with phone call about home BP readings

## 2024-07-04 LAB
ALBUMIN SERPL-MCNC: 4.5 G/DL (ref 3.9–4.9)
ALP SERPL-CCNC: 87 IU/L (ref 44–121)
ALT SERPL-CCNC: 14 IU/L (ref 0–44)
AST SERPL-CCNC: 18 IU/L (ref 0–40)
BASOPHILS # BLD AUTO: 0.1 X10E3/UL (ref 0–0.2)
BASOPHILS NFR BLD AUTO: 1 %
BILIRUB SERPL-MCNC: 0.4 MG/DL (ref 0–1.2)
BUN SERPL-MCNC: 24 MG/DL (ref 8–27)
BUN/CREAT SERPL: 15 (ref 10–24)
CALCIUM SERPL-MCNC: 9.3 MG/DL (ref 8.6–10.2)
CHLORIDE SERPL-SCNC: 101 MMOL/L (ref 96–106)
CO2 SERPL-SCNC: 24 MMOL/L (ref 20–29)
CREAT SERPL-MCNC: 1.65 MG/DL (ref 0.76–1.27)
EGFRCR SERPLBLD CKD-EPI 2021: 46 ML/MIN/1.73
EOSINOPHIL # BLD AUTO: 0.2 X10E3/UL (ref 0–0.4)
EOSINOPHIL NFR BLD AUTO: 2 %
ERYTHROCYTE [DISTWIDTH] IN BLOOD BY AUTOMATED COUNT: 12.6 % (ref 11.6–15.4)
GLOBULIN SER CALC-MCNC: 2.7 G/DL (ref 1.5–4.5)
GLUCOSE SERPL-MCNC: 104 MG/DL (ref 70–99)
HCT VFR BLD AUTO: 39.1 % (ref 37.5–51)
HGB BLD-MCNC: 12.8 G/DL (ref 13–17.7)
IMM GRANULOCYTES # BLD AUTO: 0 X10E3/UL (ref 0–0.1)
IMM GRANULOCYTES NFR BLD AUTO: 0 %
LYMPHOCYTES # BLD AUTO: 2 X10E3/UL (ref 0.7–3.1)
LYMPHOCYTES NFR BLD AUTO: 27 %
MAGNESIUM SERPL-MCNC: 2.2 MG/DL (ref 1.6–2.3)
MCH RBC QN AUTO: 29.8 PG (ref 26.6–33)
MCHC RBC AUTO-ENTMCNC: 32.7 G/DL (ref 31.5–35.7)
MCV RBC AUTO: 91 FL (ref 79–97)
MONOCYTES # BLD AUTO: 0.5 X10E3/UL (ref 0.1–0.9)
MONOCYTES NFR BLD AUTO: 6 %
MORPHOLOGY BLD-IMP: ABNORMAL
NEUTROPHILS # BLD AUTO: 4.6 X10E3/UL (ref 1.4–7)
NEUTROPHILS NFR BLD AUTO: 64 %
PLATELET # BLD AUTO: 44 X10E3/UL (ref 150–450)
POTASSIUM SERPL-SCNC: 4.5 MMOL/L (ref 3.5–5.2)
PROT SERPL-MCNC: 7.2 G/DL (ref 6–8.5)
PSA SERPL-MCNC: 6 NG/ML (ref 0–4)
RBC # BLD AUTO: 4.3 X10E6/UL (ref 4.14–5.8)
SODIUM SERPL-SCNC: 138 MMOL/L (ref 134–144)
TSH SERPL DL<=0.005 MIU/L-ACNC: 0.65 UIU/ML (ref 0.45–4.5)
WBC # BLD AUTO: 7.3 X10E3/UL (ref 3.4–10.8)

## 2024-07-05 DIAGNOSIS — R97.20 ELEVATED PSA, LESS THAN 10 NG/ML: Primary | ICD-10-CM

## 2024-07-21 ENCOUNTER — HOSPITAL ENCOUNTER (OUTPATIENT)
Dept: MRI IMAGING | Facility: HOSPITAL | Age: 64
Discharge: HOME | End: 2024-07-21
Admitting: FAMILY MEDICINE
Payer: MEDICARE

## 2024-07-21 DIAGNOSIS — R53.1 RIGHT SIDED WEAKNESS: ICD-10-CM

## 2024-07-21 DIAGNOSIS — R42 LIGHT HEADED: ICD-10-CM

## 2024-07-21 DIAGNOSIS — R42 DIZZINESS: ICD-10-CM

## 2024-07-21 PROCEDURE — A9577 INJ MULTIHANCE: HCPCS | Performed by: FAMILY MEDICINE

## 2024-07-21 PROCEDURE — 70553 MRI BRAIN STEM W/O & W/DYE: CPT

## 2024-07-21 PROCEDURE — 0 GADOBENATE DIMEGLUMINE 529 MG/ML SOLUTION: Performed by: FAMILY MEDICINE

## 2024-07-21 RX ADMIN — GADOBENATE DIMEGLUMINE 14 ML: 529 INJECTION, SOLUTION INTRAVENOUS at 10:38

## 2024-07-24 ENCOUNTER — TELEPHONE (OUTPATIENT)
Dept: FAMILY MEDICINE CLINIC | Facility: CLINIC | Age: 64
End: 2024-07-24
Payer: MEDICARE

## 2024-07-24 NOTE — TELEPHONE ENCOUNTER
Informed pt you are out of the office until next week. He was also informed to call urology about any test results that they performed on Monday. Pt voiced understanding.

## 2024-07-24 NOTE — TELEPHONE ENCOUNTER
PATIENT HAS CALLED REQUESTING A CALL BACK WITH RESULTS OF AN MRI THAT WAS DONE AT [facility] ON SUNDAY. PATIENT IS ALSO REQUESTING A CALL BACK WITH RESULTS OF PROSTATE EXAM THAT WAS DONE AT [facility] ON 07/22/24.    CALL BACK NUMBER [phone]

## 2024-07-29 DIAGNOSIS — R53.1 RIGHT SIDED WEAKNESS: Primary | ICD-10-CM

## 2024-08-05 ENCOUNTER — OFFICE VISIT (OUTPATIENT)
Dept: FAMILY MEDICINE CLINIC | Facility: CLINIC | Age: 64
End: 2024-08-05
Payer: MEDICARE

## 2024-08-05 VITALS
RESPIRATION RATE: 18 BRPM | HEIGHT: 67 IN | OXYGEN SATURATION: 97 % | BODY MASS INDEX: 25.65 KG/M2 | DIASTOLIC BLOOD PRESSURE: 78 MMHG | WEIGHT: 163.4 LBS | HEART RATE: 88 BPM | TEMPERATURE: 98.4 F | SYSTOLIC BLOOD PRESSURE: 136 MMHG

## 2024-08-05 DIAGNOSIS — G47.09 OTHER INSOMNIA: ICD-10-CM

## 2024-08-05 DIAGNOSIS — R97.20 ELEVATED PSA, LESS THAN 10 NG/ML: ICD-10-CM

## 2024-08-05 DIAGNOSIS — E78.00 HYPERCHOLESTEREMIA: ICD-10-CM

## 2024-08-05 DIAGNOSIS — I10 ESSENTIAL HYPERTENSION: Primary | ICD-10-CM

## 2024-08-05 DIAGNOSIS — H81.13 BENIGN PAROXYSMAL POSITIONAL VERTIGO DUE TO BILATERAL VESTIBULAR DISORDER: ICD-10-CM

## 2024-08-05 DIAGNOSIS — Z87.891 PERSONAL HISTORY OF TOBACCO USE, PRESENTING HAZARDS TO HEALTH: ICD-10-CM

## 2024-08-05 RX ORDER — ATORVASTATIN CALCIUM 20 MG/1
TABLET, FILM COATED ORAL
COMMUNITY
Start: 2024-07-03 | End: 2024-08-05 | Stop reason: SDUPTHER

## 2024-08-05 RX ORDER — LIDOCAINE 50 MG/G
PATCH TOPICAL
COMMUNITY
Start: 2024-07-23

## 2024-08-05 RX ORDER — ZOLPIDEM TARTRATE 12.5 MG/1
12.5 TABLET, FILM COATED, EXTENDED RELEASE ORAL NIGHTLY PRN
Qty: 30 TABLET | Refills: 5 | Status: SHIPPED | OUTPATIENT
Start: 2024-08-05

## 2024-08-05 RX ORDER — MECLIZINE HYDROCHLORIDE 25 MG/1
TABLET ORAL
Qty: 40 TABLET | Refills: 1 | Status: SHIPPED | OUTPATIENT
Start: 2024-08-05

## 2024-08-05 RX ORDER — LISINOPRIL AND HYDROCHLOROTHIAZIDE 12.5; 1 MG/1; MG/1
1 TABLET ORAL DAILY
Qty: 90 TABLET | Refills: 1 | Status: SHIPPED | OUTPATIENT
Start: 2024-08-05

## 2024-08-05 RX ORDER — ATORVASTATIN CALCIUM 20 MG/1
20 TABLET, FILM COATED ORAL DAILY
Qty: 90 TABLET | Refills: 1 | Status: SHIPPED | OUTPATIENT
Start: 2024-08-05

## 2024-08-05 NOTE — PROGRESS NOTES
Subjective   Huang Almonte is a 63 y.o. male.     Hypertension         Huang Almonte  is here for follow-up of hypertension of several years duration. He is not exercising and is not adherent to a low-salt diet. Patient does not check his blood pressure.   He is compliant with meds.        Huang Almonte returns today for follow up of Hyperlipidemia  Huang indicates his exercise level as irregularly.  Diet: unchanged  Patient is compliant with medications   Any side effects to medications:   chest pain No myalgia No memory change No  Pt is due for labs        He continues to have dizziness  This has been a longstanding issue for him  He has not fallen but this is a pretty common thing for him to deal with   Worse with change in movement or position      The following portions of the patient's history were reviewed and updated as appropriate: allergies, current medications, past family history, past medical history, past social history, past surgical history, and problem list.    Review of Systems   Constitutional: Negative.    Respiratory: Negative.     Cardiovascular: Negative.    Psychiatric/Behavioral: Negative.         Objective   Physical Exam  Vitals and nursing note reviewed.   Constitutional:       General: He is not in acute distress.     Appearance: Normal appearance. He is well-developed.   Cardiovascular:      Rate and Rhythm: Normal rate and regular rhythm.      Heart sounds: Normal heart sounds.   Pulmonary:      Effort: Pulmonary effort is normal.      Breath sounds: Normal breath sounds.   Abdominal:      General: Abdomen is flat. Bowel sounds are normal. There is no distension.      Palpations: Abdomen is soft.      Tenderness: There is no abdominal tenderness. There is no guarding or rebound.   Neurological:      Mental Status: He is alert and oriented to person, place, and time.   Psychiatric:         Mood and Affect: Mood normal.         Behavior: Behavior normal.         Thought Content: Thought  content normal.         Judgment: Judgment normal.         Assessment & Plan   Diagnoses and all orders for this visit:    1. Essential hypertension (Primary)  -     lisinopril-hydrochlorothiazide (Zestoretic) 10-12.5 MG per tablet; Take 1 tablet by mouth Daily.  Dispense: 90 tablet; Refill: 1  -     CBC & Differential  -     Comprehensive Metabolic Panel    2. Hypercholesteremia  -     atorvastatin (LIPITOR) 20 MG tablet; Take 1 tablet by mouth Daily.  Dispense: 90 tablet; Refill: 1  -     Lipid Panel  -     Comprehensive Metabolic Panel    3. Benign paroxysmal positional vertigo due to bilateral vestibular disorder  -     meclizine (ANTIVERT) 25 MG tablet; 1/2 to 1 pill as needed every 4 hours for dizziness  Dispense: 40 tablet; Refill: 1    4. Elevated PSA, less than 10 ng/ml  -     PSA DIAGNOSTIC; Future    5. Other insomnia  -     zolpidem CR (AMBIEN CR) 12.5 MG CR tablet; Take 1 tablet by mouth At Night As Needed for Sleep.  Dispense: 30 tablet; Refill: 5    6. Personal history of tobacco use, presenting hazards to health  -      CT Chest Low Dose Cancer Screening WO; Future    BP doing well, will continue prinzide and check labs  No change in liptor and will recheck lipids  Ok PRN meclizine for longstanding vertigo.  Discussed ENT eval but he declines at this time  Discussed elevated PSA, will recheck in 3 months and he has MRI prostate scheduled through Dr. Sr in next couple weeks    Ok ambien refill, YESY reviewed    Discussed smoking cessation.  He is now vaping and working on reducing this.  He will be due for lung CT in september, will order today and f/u pending results

## 2024-08-06 ENCOUNTER — TELEPHONE (OUTPATIENT)
Dept: FAMILY MEDICINE CLINIC | Facility: CLINIC | Age: 64
End: 2024-08-06
Payer: MEDICARE

## 2024-08-06 PROBLEM — N18.31 STAGE 3A CHRONIC KIDNEY DISEASE: Status: ACTIVE | Noted: 2024-08-06

## 2024-08-06 LAB
ALBUMIN SERPL-MCNC: 4.5 G/DL (ref 3.5–5.2)
ALBUMIN/GLOB SERPL: 2.3 G/DL
ALP SERPL-CCNC: 89 U/L (ref 39–117)
ALT SERPL-CCNC: 22 U/L (ref 1–41)
AST SERPL-CCNC: 22 U/L (ref 1–40)
BASOPHILS # BLD AUTO: 0.08 10*3/MM3 (ref 0–0.2)
BASOPHILS NFR BLD AUTO: 1.2 % (ref 0–1.5)
BILIRUB SERPL-MCNC: 0.4 MG/DL (ref 0–1.2)
BUN SERPL-MCNC: 19 MG/DL (ref 8–23)
BUN/CREAT SERPL: 11.9 (ref 7–25)
CALCIUM SERPL-MCNC: 9.6 MG/DL (ref 8.6–10.5)
CHLORIDE SERPL-SCNC: 101 MMOL/L (ref 98–107)
CHOLEST SERPL-MCNC: 160 MG/DL (ref 0–200)
CO2 SERPL-SCNC: 25.9 MMOL/L (ref 22–29)
CREAT SERPL-MCNC: 1.59 MG/DL (ref 0.76–1.27)
EGFRCR SERPLBLD CKD-EPI 2021: 48.2 ML/MIN/1.73
EOSINOPHIL # BLD AUTO: 0.06 10*3/MM3 (ref 0–0.4)
EOSINOPHIL NFR BLD AUTO: 0.9 % (ref 0.3–6.2)
ERYTHROCYTE [DISTWIDTH] IN BLOOD BY AUTOMATED COUNT: 13.1 % (ref 12.3–15.4)
GLOBULIN SER CALC-MCNC: 2 GM/DL
GLUCOSE SERPL-MCNC: 105 MG/DL (ref 65–99)
HCT VFR BLD AUTO: 40.3 % (ref 37.5–51)
HDLC SERPL-MCNC: 41 MG/DL (ref 40–60)
HGB BLD-MCNC: 13.5 G/DL (ref 13–17.7)
IMM GRANULOCYTES # BLD AUTO: 0.03 10*3/MM3 (ref 0–0.05)
IMM GRANULOCYTES NFR BLD AUTO: 0.4 % (ref 0–0.5)
LDLC SERPL CALC-MCNC: 82 MG/DL (ref 0–100)
LYMPHOCYTES # BLD AUTO: 1.7 10*3/MM3 (ref 0.7–3.1)
LYMPHOCYTES NFR BLD AUTO: 24.5 % (ref 19.6–45.3)
MCH RBC QN AUTO: 30.6 PG (ref 26.6–33)
MCHC RBC AUTO-ENTMCNC: 33.5 G/DL (ref 31.5–35.7)
MCV RBC AUTO: 91.4 FL (ref 79–97)
MONOCYTES # BLD AUTO: 0.46 10*3/MM3 (ref 0.1–0.9)
MONOCYTES NFR BLD AUTO: 6.6 % (ref 5–12)
NEUTROPHILS # BLD AUTO: 4.61 10*3/MM3 (ref 1.7–7)
NEUTROPHILS NFR BLD AUTO: 66.4 % (ref 42.7–76)
PLATELET # BLD AUTO: 46 10*3/MM3 (ref 140–450)
POTASSIUM SERPL-SCNC: 4.6 MMOL/L (ref 3.5–5.2)
PROT SERPL-MCNC: 6.5 G/DL (ref 6–8.5)
RBC # BLD AUTO: 4.41 10*6/MM3 (ref 4.14–5.8)
SODIUM SERPL-SCNC: 136 MMOL/L (ref 136–145)
TRIGL SERPL-MCNC: 224 MG/DL (ref 0–150)
VLDLC SERPL CALC-MCNC: 37 MG/DL (ref 5–40)
WBC # BLD AUTO: 6.94 10*3/MM3 (ref 3.4–10.8)

## 2024-08-20 DIAGNOSIS — G47.09 OTHER INSOMNIA: ICD-10-CM

## 2024-08-20 NOTE — TELEPHONE ENCOUNTER
Caller: Huang Almonte    Relationship: Self    Best call back number: [phone]    Requested Prescriptions:   Requested Prescriptions     Pending Prescriptions Disp Refills    zolpidem CR (AMBIEN CR) 12.5 MG CR tablet 30 tablet 5     Sig: Take 1 tablet by mouth At Night As Needed for Sleep.        Pharmacy where request should be sent: [facility] [id number] [address] [phone] [facility] [phone] FX     Last office visit with prescribing clinician: 8/5/2024   Last telemedicine visit with prescribing clinician: Visit date not found   Next office visit with prescribing clinician: 2/5/2025     Additional details provided by patient: PATIENT IS OUT OF MEDICATION    Does the patient have less than a 3 day supply:  [x] Yes  [] No    Would you like a call back once the refill request has been completed: [x] Yes [] No    If the office needs to give you a call back, can they leave a voicemail: [x] Yes [] No    Missy Humphreys   08/20/24 08:29 EDT

## 2024-08-21 RX ORDER — ZOLPIDEM TARTRATE 12.5 MG/1
12.5 TABLET, FILM COATED, EXTENDED RELEASE ORAL NIGHTLY PRN
Qty: 30 TABLET | Refills: 5 | Status: SHIPPED | OUTPATIENT
Start: 2024-08-21

## 2024-09-10 ENCOUNTER — HOSPITAL ENCOUNTER (OUTPATIENT)
Dept: MRI IMAGING | Facility: HOSPITAL | Age: 64
Discharge: HOME | End: 2024-09-10
Admitting: FAMILY MEDICINE
Payer: MEDICARE

## 2024-09-10 ENCOUNTER — OFFICE VISIT (OUTPATIENT)
Dept: FAMILY MEDICINE CLINIC | Facility: CLINIC | Age: 64
End: 2024-09-10
Payer: MEDICARE

## 2024-09-10 ENCOUNTER — TELEPHONE (OUTPATIENT)
Dept: FAMILY MEDICINE CLINIC | Facility: CLINIC | Age: 64
End: 2024-09-10

## 2024-09-10 VITALS
DIASTOLIC BLOOD PRESSURE: 78 MMHG | TEMPERATURE: 97.8 F | RESPIRATION RATE: 16 BRPM | SYSTOLIC BLOOD PRESSURE: 160 MMHG | HEART RATE: 83 BPM | BODY MASS INDEX: 25.9 KG/M2 | WEIGHT: 165 LBS | HEIGHT: 67 IN

## 2024-09-10 DIAGNOSIS — I63.9 CEREBROVASCULAR ACCIDENT (CVA), UNSPECIFIED MECHANISM: Primary | ICD-10-CM

## 2024-09-10 DIAGNOSIS — I63.412 CEREBRAL INFARCTION DUE TO EMBOLISM OF LEFT MIDDLE CEREBRAL ARTERY: ICD-10-CM

## 2024-09-10 DIAGNOSIS — E78.00 HYPERCHOLESTEREMIA: ICD-10-CM

## 2024-09-10 DIAGNOSIS — R20.0 RIGHT UPPER EXTREMITY NUMBNESS: ICD-10-CM

## 2024-09-10 DIAGNOSIS — R20.0 NUMBNESS OF RIGHT LOWER EXTREMITY: ICD-10-CM

## 2024-09-10 PROCEDURE — 70551 MRI BRAIN STEM W/O DYE: CPT

## 2024-09-10 PROCEDURE — 3078F DIAST BP <80 MM HG: CPT | Performed by: FAMILY MEDICINE

## 2024-09-10 PROCEDURE — 99214 OFFICE O/P EST MOD 30 MIN: CPT | Performed by: FAMILY MEDICINE

## 2024-09-10 PROCEDURE — 3077F SYST BP >= 140 MM HG: CPT | Performed by: FAMILY MEDICINE

## 2024-09-10 PROCEDURE — 1126F AMNT PAIN NOTED NONE PRSNT: CPT | Performed by: FAMILY MEDICINE

## 2024-09-10 RX ORDER — ATORVASTATIN CALCIUM 40 MG/1
40 TABLET, FILM COATED ORAL DAILY
Qty: 90 TABLET | Refills: 1 | Status: SHIPPED | OUTPATIENT
Start: 2024-09-10

## 2024-09-10 RX ORDER — ASPIRIN 81 MG/1
81 TABLET ORAL DAILY
Qty: 30 TABLET | Refills: 5 | Status: SHIPPED | OUTPATIENT
Start: 2024-09-10

## 2024-09-10 NOTE — TELEPHONE ENCOUNTER
AT CHECKOUT PATIENT WANTED TO ASK PCP IF IT WAS OKAY FOR HIM TO RETURN TO WORK TODAY. PATIENT IS DUE TO GO IN AT 12PM        PLEASE ADVISE

## 2024-09-10 NOTE — TELEPHONE ENCOUNTER
PER SCHEDULING FOR THE ULTRASOUND CAROTID, THEY SATED THIS NEEDS TO BE CHANGED TO A DUPLEX CAROTID TO SCHEDULE.     PATIENT IS SCHEDULED FOR MRI TODAY AND IS AWARE.

## 2024-09-10 NOTE — PROGRESS NOTES
Subjective   Huang Almonte is a 63 y.o. male.     History of Present Illness     Started on 9/5/24 with some loss of sensation of his RUE and then yesterday his R foot  Could not feel his shoe fell off  Could not feel when he was getting out of the car where his R foot was in space!  Did not fall but just hard to feel where the foot was as he moved it  Not falling    He has noted some slight word finding issues  He continues to drop things since the 7/3/24 appointment  Not really weak but more loss of sensation    We had placed neuro referral at that time but pt has canceled this appointment!    The following portions of the patient's history were reviewed and updated as appropriate: allergies, current medications, past family history, past medical history, past social history, past surgical history, and problem list.    Review of Systems   Constitutional: Negative.    Respiratory: Negative.     Cardiovascular: Negative.    Psychiatric/Behavioral: Negative.         Objective   Physical Exam  Vitals and nursing note reviewed.   Constitutional:       General: He is not in acute distress.     Appearance: Normal appearance. He is well-developed.   Cardiovascular:      Rate and Rhythm: Normal rate and regular rhythm.      Heart sounds: Normal heart sounds.   Pulmonary:      Effort: Pulmonary effort is normal.      Breath sounds: Normal breath sounds.   Musculoskeletal:      Comments: Normal BUE strength and normal gait.  Decreased sensation to TORIN to shoulder to pin prick  Decreased gross sensation in hand  Decreased sensation R foot to mid shin to pin prick   Neurological:      Mental Status: He is alert and oriented to person, place, and time.   Psychiatric:         Mood and Affect: Mood normal.         Behavior: Behavior normal.         Thought Content: Thought content normal.         Judgment: Judgment normal.         Assessment & Plan   Diagnoses and all orders for this visit:    1. Cerebrovascular accident (CVA),  unspecified mechanism (Primary)  -     Adult Transthoracic Echo Complete W/ Cont if Necessary Per Protocol; Future    2. Right upper extremity numbness  -     MRI Brain Without Contrast; Future  -     US Carotid Bilateral; Future  -     aspirin 81 MG EC tablet; Take 1 tablet by mouth Daily.  Dispense: 30 tablet; Refill: 5  -     Ambulatory Referral to Neurology    3. Numbness of right lower extremity  -     MRI Brain Without Contrast; Future  -     US Carotid Bilateral; Future  -     aspirin 81 MG EC tablet; Take 1 tablet by mouth Daily.  Dispense: 30 tablet; Refill: 5  -     Ambulatory Referral to Neurology    4. Hypercholesteremia  -     atorvastatin (LIPITOR) 40 MG tablet; Take 1 tablet by mouth Daily.  Dispense: 90 tablet; Refill: 1    5. Cerebral infarction due to embolism of left middle cerebral artery  -     Adult Transthoracic Echo Complete W/ Cont if Necessary Per Protocol; Future    I am concerned about CVA but most of this symptoms started 5 days ago.  Will work on MRI  Want him to start ASA but he notes he has prostate biopsy on 9/13/24.  Will also start plavix for 21 days ASAP, again prostate biopsy may delay this  Will increase statin  Will check carotid US and ECHO to look for cause.    Will place another neuro referral as he had canceled the one placed in July...    Addendum:  MRI read as acute CVA.  I spoke with pt.  Symptoms have been pretty stable the past few days.  We discussed foot and he will monitor if this is worsening.  Advised ER or call us with any further changes

## 2024-09-13 ENCOUNTER — TELEPHONE (OUTPATIENT)
Dept: FAMILY MEDICINE CLINIC | Facility: CLINIC | Age: 64
End: 2024-09-13
Payer: MEDICARE

## 2024-09-13 DIAGNOSIS — I63.9 CEREBROVASCULAR ACCIDENT (CVA), UNSPECIFIED MECHANISM: Primary | ICD-10-CM

## 2024-09-13 RX ORDER — CLOPIDOGREL BISULFATE 75 MG/1
75 TABLET ORAL DAILY
Qty: 21 TABLET | Refills: 0 | Status: SHIPPED | OUTPATIENT
Start: 2024-09-13

## 2024-09-13 NOTE — TELEPHONE ENCOUNTER
Caller: Huang Almonte    Relationship: Self    Best call back number: [phone]     What medication are you requesting: PLAVIX      If a prescription is needed, what is your preferred pharmacy and phone number: [facility] [id number] - [place] [address] [facility] - [phone]  - [phone] FX     Additional notes:PATIENT STATES THAT HE WAS SUPPOSED TO START THIS TOMORROW

## 2024-09-16 ENCOUNTER — TELEPHONE (OUTPATIENT)
Dept: FAMILY MEDICINE CLINIC | Facility: CLINIC | Age: 64
End: 2024-09-16
Payer: MEDICARE

## 2024-09-23 ENCOUNTER — HOSPITAL ENCOUNTER (OUTPATIENT)
Dept: CT IMAGING | Facility: HOSPITAL | Age: 64
Discharge: HOME | End: 2024-09-23
Admitting: FAMILY MEDICINE
Payer: MEDICARE

## 2024-09-23 DIAGNOSIS — Z87.891 PERSONAL HISTORY OF TOBACCO USE, PRESENTING HAZARDS TO HEALTH: ICD-10-CM

## 2024-09-23 PROCEDURE — 71271 CT THORAX LUNG CANCER SCR C-: CPT

## 2024-10-01 DIAGNOSIS — I63.9 CEREBROVASCULAR ACCIDENT (CVA), UNSPECIFIED MECHANISM: ICD-10-CM

## 2024-10-01 RX ORDER — CLOPIDOGREL BISULFATE 75 MG/1
75 TABLET ORAL DAILY
Qty: 21 TABLET | Refills: 0 | OUTPATIENT
Start: 2024-10-01

## 2024-10-08 ENCOUNTER — OFFICE VISIT (OUTPATIENT)
Dept: FAMILY MEDICINE CLINIC | Facility: CLINIC | Age: 64
End: 2024-10-08
Payer: MEDICARE

## 2024-10-08 VITALS
SYSTOLIC BLOOD PRESSURE: 140 MMHG | RESPIRATION RATE: 16 BRPM | HEIGHT: 67 IN | BODY MASS INDEX: 26.06 KG/M2 | HEART RATE: 69 BPM | WEIGHT: 166 LBS | TEMPERATURE: 98.4 F | DIASTOLIC BLOOD PRESSURE: 70 MMHG

## 2024-10-08 DIAGNOSIS — I63.512 CEREBROVASCULAR ACCIDENT (CVA) DUE TO OCCLUSION OF LEFT MIDDLE CEREBRAL ARTERY: Primary | ICD-10-CM

## 2024-10-08 DIAGNOSIS — I10 ESSENTIAL HYPERTENSION: ICD-10-CM

## 2024-10-08 DIAGNOSIS — R20.0 NUMBNESS OF RIGHT LOWER EXTREMITY: ICD-10-CM

## 2024-10-08 DIAGNOSIS — E78.00 HYPERCHOLESTEREMIA: ICD-10-CM

## 2024-10-08 DIAGNOSIS — R20.0 RIGHT UPPER EXTREMITY NUMBNESS: ICD-10-CM

## 2024-10-08 PROBLEM — C61 PROSTATE CANCER: Status: ACTIVE | Noted: 2024-10-08

## 2024-10-08 PROCEDURE — 1160F RVW MEDS BY RX/DR IN RCRD: CPT | Performed by: FAMILY MEDICINE

## 2024-10-08 PROCEDURE — 1126F AMNT PAIN NOTED NONE PRSNT: CPT | Performed by: FAMILY MEDICINE

## 2024-10-08 PROCEDURE — 3078F DIAST BP <80 MM HG: CPT | Performed by: FAMILY MEDICINE

## 2024-10-08 PROCEDURE — G2211 COMPLEX E/M VISIT ADD ON: HCPCS | Performed by: FAMILY MEDICINE

## 2024-10-08 PROCEDURE — 1159F MED LIST DOCD IN RCRD: CPT | Performed by: FAMILY MEDICINE

## 2024-10-08 PROCEDURE — 3077F SYST BP >= 140 MM HG: CPT | Performed by: FAMILY MEDICINE

## 2024-10-08 PROCEDURE — 99214 OFFICE O/P EST MOD 30 MIN: CPT | Performed by: FAMILY MEDICINE

## 2024-10-08 NOTE — PROGRESS NOTES
Subjective   Huang Almonte is a 64 y.o. male.     History of Present Illness     He still has some RUE dexterity issues  No worsening of symptoms but the right hand is not back to base line  MRI showed infarct in left MCA territory   He has been taking his ASA  He has been tolerating the increase in lipitor to 40 mg      He had 16 prostate biopsies and 6 were low risk  Seeing Dr. Linn for this  Prostatectomy recommended based on biopsy results  He wonders what I would recommend but I do not have current records to review with him about biopsies     The following portions of the patient's history were reviewed and updated as appropriate: allergies, current medications, past family history, past medical history, past social history, past surgical history, and problem list.    Review of Systems   Constitutional: Negative.        Objective   Physical Exam  Vitals and nursing note reviewed.   Constitutional:       General: He is not in acute distress.     Appearance: Normal appearance. He is well-developed.   Cardiovascular:      Rate and Rhythm: Normal rate and regular rhythm.      Heart sounds: Normal heart sounds.   Pulmonary:      Effort: Pulmonary effort is normal.      Breath sounds: Normal breath sounds.   Neurological:      Mental Status: He is alert and oriented to person, place, and time.   Psychiatric:         Mood and Affect: Mood normal.         Behavior: Behavior normal.         Thought Content: Thought content normal.         Judgment: Judgment normal.         Assessment & Plan   Diagnoses and all orders for this visit:    1. Cerebrovascular accident (CVA) due to occlusion of left middle cerebral artery (Primary)  -     Ambulatory Referral to Physical Therapy for Evaluation & Treatment  -     clopidogrel (Plavix) 75 MG tablet; Take 1 tablet by mouth Daily.  Dispense: 30 tablet; Refill: 5    2. Right upper extremity numbness  -     Ambulatory Referral to Physical Therapy for Evaluation & Treatment    3.  Numbness of right lower extremity  -     Ambulatory Referral to Physical Therapy for Evaluation & Treatment    4. Essential hypertension    5. Hypercholesteremia    Will work on PT for RUE hand issues.  continue ASA, statin, and BP control.  Working on ECHO and carotid US  Pt agrees  I did further research and plavix recommended to prevent further events.    Will review urology information and pt asks about prostate treatment and he will f/u with urology as scheduled

## 2024-10-14 ENCOUNTER — TELEPHONE (OUTPATIENT)
Dept: FAMILY MEDICINE CLINIC | Facility: CLINIC | Age: 64
End: 2024-10-14
Payer: MEDICARE

## 2024-10-14 PROBLEM — G45.9 TIA (TRANSIENT ISCHEMIC ATTACK): Status: ACTIVE | Noted: 2024-10-14

## 2024-10-14 PROBLEM — G45.9 TIA (TRANSIENT ISCHEMIC ATTACK): Status: RESOLVED | Noted: 2024-10-14 | Resolved: 2024-10-14

## 2024-10-14 RX ORDER — CLOPIDOGREL BISULFATE 75 MG/1
75 TABLET ORAL DAILY
Qty: 30 TABLET | Refills: 5 | Status: SHIPPED | OUTPATIENT
Start: 2024-10-14

## 2024-10-14 NOTE — TELEPHONE ENCOUNTER
Please let patient know that I do think we should continue plavix.  Several studies showed it was superior to aspirin alone for preventing stroke in patients who have had an event.  I did send in script.    I have been waiting for records form Dr. Linn.  He does treat most of my patients and so I depend on him to determine best course of action with prostate cancer.  I do think pt should continue to discuss options with him but I still have to wait for records to give further input.

## 2024-10-30 ENCOUNTER — HOSPITAL ENCOUNTER (OUTPATIENT)
Dept: CARDIOLOGY | Facility: HOSPITAL | Age: 64
Discharge: HOME | End: 2024-10-30
Payer: MEDICARE

## 2024-10-30 VITALS — WEIGHT: 166.01 LBS | HEIGHT: 67 IN | BODY MASS INDEX: 26.06 KG/M2

## 2024-10-30 DIAGNOSIS — I63.9 CEREBROVASCULAR ACCIDENT (CVA), UNSPECIFIED MECHANISM: ICD-10-CM

## 2024-10-30 DIAGNOSIS — I63.412 CEREBRAL INFARCTION DUE TO EMBOLISM OF LEFT MIDDLE CEREBRAL ARTERY: ICD-10-CM

## 2024-10-30 LAB
BH CV ECHO LEFT VENTRICLE GLOBAL LONGITUDINAL STRAIN: -19.8 %
BH CV ECHO MEAS - AO MAX PG: 3.3 MMHG
BH CV ECHO MEAS - AO ROOT DIAM: 4 CM
BH CV ECHO MEAS - AO V2 MAX: 90.4 CM/SEC
BH CV ECHO MEAS - EF(MOD-BP): 55.7 %
BH CV ECHO MEAS - IVS/LVPW: 0.9 CM
BH CV ECHO MEAS - IVSD: 0.9 CM
BH CV ECHO MEAS - LA DIMENSION: 3.2 CM
BH CV ECHO MEAS - LAT PEAK E' VEL: 10.5 CM/SEC
BH CV ECHO MEAS - LV MAX PG: 2.41 MMHG
BH CV ECHO MEAS - LV MEAN PG: 1.1 MMHG
BH CV ECHO MEAS - LV V1 MAX: 77.7 CM/SEC
BH CV ECHO MEAS - LV V1 VTI: 15.7 CM
BH CV ECHO MEAS - LVIDD: 4.1 CM
BH CV ECHO MEAS - LVIDS: 2.7 CM
BH CV ECHO MEAS - LVOT DIAM: 2 CM
BH CV ECHO MEAS - LVPWD: 1 CM
BH CV ECHO MEAS - MED PEAK E' VEL: 9.5 CM/SEC
BH CV ECHO MEAS - MV A MAX VEL: 71.3 CM/SEC
BH CV ECHO MEAS - MV E MAX VEL: 46.3 CM/SEC
BH CV ECHO MEAS - MV E/A: 0.65
BH CV ECHO MEAS - MV MAX PG: 2.8 MMHG
BH CV ECHO MEAS - MV P1/2T: 82 MSEC
BH CV ECHO MEAS - PA ACC TIME: 0.19 SEC
BH CV ECHO MEAS - TAPSE (>1.6): 1.75 CM
BH CV ECHO MEASUREMENTS AVERAGE E/E' RATIO: 4.63
BH CV ECHO SHUNT ASSESSMENT PERFORMED (HIDDEN SCRIPTING): 1
BH CV VAS PRELIMINARY FINDINGS SCRIPTING: 1
BH CV XLRA - RV BASE: 3.5 CM
BH CV XLRA - RV LENGTH: 6.7 CM
BH CV XLRA - RV MID: 2.27 CM
BH CV XLRA - TDI S': 12.3 CM/SEC
BH CV XLRA MEAS LEFT DIST CCA EDV: 12.3 CM/SEC
BH CV XLRA MEAS LEFT DIST CCA PSV: 46.3 CM/SEC
BH CV XLRA MEAS LEFT MID CCA EDV: 12.3 CM/SEC
BH CV XLRA MEAS LEFT MID CCA PSV: 53.2 CM/SEC
BH CV XLRA MEAS LEFT PROX CCA EDV: 10.2 CM/SEC
BH CV XLRA MEAS LEFT PROX CCA PSV: 71.1 CM/SEC
BH CV XLRA MEAS LEFT PROX ECA EDV: 13.6 CM/SEC
BH CV XLRA MEAS LEFT PROX ECA PSV: 66.4 CM/SEC
BH CV XLRA MEAS LEFT PROX SCLA PSV: 104.7 CM/SEC
BH CV XLRA MEAS LEFT VERTEBRAL A EDV: 21.1 CM/SEC
BH CV XLRA MEAS LEFT VERTEBRAL A PSV: 53.4 CM/SEC
BH CV XLRA MEAS RIGHT DIST CCA EDV: 20.6 CM/SEC
BH CV XLRA MEAS RIGHT DIST CCA PSV: 69.5 CM/SEC
BH CV XLRA MEAS RIGHT DIST ICA EDV: 36.1 CM/SEC
BH CV XLRA MEAS RIGHT DIST ICA PSV: 82.1 CM/SEC
BH CV XLRA MEAS RIGHT ICA/CCA RATIO: 1.08
BH CV XLRA MEAS RIGHT MID CCA EDV: 22.2 CM/SEC
BH CV XLRA MEAS RIGHT MID CCA PSV: 75.8 CM/SEC
BH CV XLRA MEAS RIGHT MID ICA EDV: 24.8 CM/SEC
BH CV XLRA MEAS RIGHT MID ICA PSV: 65.2 CM/SEC
BH CV XLRA MEAS RIGHT PROX CCA EDV: 17.3 CM/SEC
BH CV XLRA MEAS RIGHT PROX CCA PSV: 81.1 CM/SEC
BH CV XLRA MEAS RIGHT PROX ECA EDV: 12.8 CM/SEC
BH CV XLRA MEAS RIGHT PROX ECA PSV: 79.5 CM/SEC
BH CV XLRA MEAS RIGHT PROX ICA EDV: 20.5 CM/SEC
BH CV XLRA MEAS RIGHT PROX ICA PSV: 63.3 CM/SEC
BH CV XLRA MEAS RIGHT PROX SCLA PSV: 88.3 CM/SEC
BH CV XLRA MEAS RIGHT VERTEBRAL A EDV: 8.7 CM/SEC
BH CV XLRA MEAS RIGHT VERTEBRAL A PSV: 37.7 CM/SEC
LEFT ARM BP: NORMAL MMHG
RIGHT ARM BP: NORMAL MMHG

## 2024-10-30 PROCEDURE — 93880 EXTRACRANIAL BILAT STUDY: CPT

## 2024-10-30 PROCEDURE — 93306 TTE W/DOPPLER COMPLETE: CPT

## 2024-10-30 PROCEDURE — 93306 TTE W/DOPPLER COMPLETE: CPT | Performed by: INTERNAL MEDICINE

## 2024-10-30 PROCEDURE — 93880 EXTRACRANIAL BILAT STUDY: CPT | Performed by: INTERNAL MEDICINE

## 2024-11-01 DIAGNOSIS — I65.22 LEFT CAROTID ARTERY OCCLUSION: Primary | ICD-10-CM

## 2024-11-06 ENCOUNTER — TRANSCRIBE ORDERS (OUTPATIENT)
Dept: ADMINISTRATIVE | Facility: HOSPITAL | Age: 64
End: 2024-11-06
Payer: MEDICARE

## 2024-11-06 DIAGNOSIS — I65.22 OCCLUSION OF LEFT CAROTID ARTERY: Primary | ICD-10-CM

## 2024-11-06 DIAGNOSIS — I63.9: ICD-10-CM

## 2024-11-07 ENCOUNTER — HOSPITAL ENCOUNTER (OUTPATIENT)
Dept: CT IMAGING | Facility: HOSPITAL | Age: 64
End: 2024-11-07
Payer: MEDICARE

## 2024-11-07 ENCOUNTER — HOSPITAL ENCOUNTER (OUTPATIENT)
Dept: CT IMAGING | Facility: HOSPITAL | Age: 64
Discharge: HOME | End: 2024-11-07
Admitting: SURGERY
Payer: MEDICARE

## 2024-11-07 DIAGNOSIS — I63.9: ICD-10-CM

## 2024-11-07 DIAGNOSIS — I65.22 OCCLUSION OF LEFT CAROTID ARTERY: ICD-10-CM

## 2024-11-07 LAB — CREAT BLDA-MCNC: 1.7 MG/DL (ref 0.6–1.3)

## 2024-11-07 PROCEDURE — 25510000001 IOPAMIDOL PER 1 ML: Performed by: SURGERY

## 2024-11-07 PROCEDURE — 70498 CT ANGIOGRAPHY NECK: CPT

## 2024-11-07 PROCEDURE — 82565 ASSAY OF CREATININE: CPT

## 2024-11-07 PROCEDURE — 70496 CT ANGIOGRAPHY HEAD: CPT

## 2024-11-07 RX ORDER — IOPAMIDOL 755 MG/ML
95 INJECTION, SOLUTION INTRAVASCULAR
Status: COMPLETED | OUTPATIENT
Start: 2024-11-07 | End: 2024-11-07

## 2024-11-07 RX ADMIN — IOPAMIDOL 95 ML: 755 INJECTION, SOLUTION INTRAVENOUS at 10:30

## 2024-12-06 ENCOUNTER — OFFICE VISIT (OUTPATIENT)
Dept: ONCOLOGY | Facility: CLINIC | Age: 64
End: 2024-12-06
Payer: MEDICARE

## 2024-12-06 ENCOUNTER — LAB (OUTPATIENT)
Dept: LAB | Facility: HOSPITAL | Age: 64
End: 2024-12-06
Payer: MEDICARE

## 2024-12-06 VITALS
TEMPERATURE: 97.7 F | WEIGHT: 170.3 LBS | BODY MASS INDEX: 26.73 KG/M2 | OXYGEN SATURATION: 99 % | SYSTOLIC BLOOD PRESSURE: 173 MMHG | DIASTOLIC BLOOD PRESSURE: 98 MMHG | HEART RATE: 92 BPM | HEIGHT: 67 IN | RESPIRATION RATE: 16 BRPM

## 2024-12-06 DIAGNOSIS — I63.512 CEREBROVASCULAR ACCIDENT (CVA) DUE TO OCCLUSION OF LEFT MIDDLE CEREBRAL ARTERY: ICD-10-CM

## 2024-12-06 DIAGNOSIS — D69.3 CHRONIC ITP (IDIOPATHIC THROMBOCYTOPENIA): ICD-10-CM

## 2024-12-06 DIAGNOSIS — C61 PROSTATE CANCER: ICD-10-CM

## 2024-12-06 DIAGNOSIS — D69.3 CHRONIC ITP (IDIOPATHIC THROMBOCYTOPENIA): Primary | ICD-10-CM

## 2024-12-06 LAB
BASOPHILS # BLD AUTO: 0.04 10*3/MM3 (ref 0–0.2)
BASOPHILS NFR BLD AUTO: 0.5 % (ref 0–1.5)
DEPRECATED RDW RBC AUTO: 41.7 FL (ref 37–54)
EOSINOPHIL # BLD AUTO: 0.13 10*3/MM3 (ref 0–0.4)
EOSINOPHIL NFR BLD AUTO: 1.7 % (ref 0.3–6.2)
ERYTHROCYTE [DISTWIDTH] IN BLOOD BY AUTOMATED COUNT: 12.5 % (ref 12.3–15.4)
HCT VFR BLD AUTO: 39.1 % (ref 37.5–51)
HGB BLD-MCNC: 13.5 G/DL (ref 13–17.7)
IMM GRANULOCYTES # BLD AUTO: 0.01 10*3/MM3 (ref 0–0.05)
IMM GRANULOCYTES NFR BLD AUTO: 0.1 % (ref 0–0.5)
LYMPHOCYTES # BLD AUTO: 1.94 10*3/MM3 (ref 0.7–3.1)
LYMPHOCYTES NFR BLD AUTO: 25.7 % (ref 19.6–45.3)
MCH RBC QN AUTO: 30.6 PG (ref 26.6–33)
MCHC RBC AUTO-ENTMCNC: 34.5 G/DL (ref 31.5–35.7)
MCV RBC AUTO: 88.7 FL (ref 79–97)
MONOCYTES # BLD AUTO: 0.52 10*3/MM3 (ref 0.1–0.9)
MONOCYTES NFR BLD AUTO: 6.9 % (ref 5–12)
NEUTROPHILS NFR BLD AUTO: 4.9 10*3/MM3 (ref 1.7–7)
NEUTROPHILS NFR BLD AUTO: 65.1 % (ref 42.7–76)
PLATELET # BLD AUTO: 42 10*3/MM3 (ref 140–450)
PMV BLD AUTO: 10.6 FL (ref 6–12)
RBC # BLD AUTO: 4.41 10*6/MM3 (ref 4.14–5.8)
WBC NRBC COR # BLD AUTO: 7.54 10*3/MM3 (ref 3.4–10.8)

## 2024-12-06 PROCEDURE — 99214 OFFICE O/P EST MOD 30 MIN: CPT | Performed by: INTERNAL MEDICINE

## 2024-12-06 PROCEDURE — 3077F SYST BP >= 140 MM HG: CPT | Performed by: INTERNAL MEDICINE

## 2024-12-06 PROCEDURE — 85025 COMPLETE CBC W/AUTO DIFF WBC: CPT

## 2024-12-06 PROCEDURE — 3080F DIAST BP >= 90 MM HG: CPT | Performed by: INTERNAL MEDICINE

## 2024-12-06 PROCEDURE — 1126F AMNT PAIN NOTED NONE PRSNT: CPT | Performed by: INTERNAL MEDICINE

## 2024-12-06 PROCEDURE — 36415 COLL VENOUS BLD VENIPUNCTURE: CPT

## 2024-12-06 NOTE — PROGRESS NOTES
Follow Up Office Visit      Date: 2024     Patient Name: Huang Almonte  MRN: [id number]  : 1960  Referring Physician: Jeremías Brar     Chief Complaint:  Follow-up for ITP     History of Present Illness: Huang Almonte is a pleasant 62 y.o. male with past medical history of ITP, hypertension, hyperlipidemia, iron deficiency anemia who presents today for evaluation of ITP. The patient was diagnosed with ITP about 6-7 years ago.  He initially presented to Dr. Duggan in [place] with a platelet count of 100K.  Initial work-up was negative for HIV, B12, folate deficiency and a normal TSH.  Ultrasound of the liver and spleen were normal.  He was started on prednisone with improvement of his platelet count.  Upon steroid taper his platelets dropped to 34 K.  He was then started on prednisone 5 mg every other day with stabilization of his platelets to around 70K- 100K.  Patient was then lost to follow-up due to financial toxicity and insurance issues.  He was managed by his PCP with stable platelet count around 70K-100K until this past year when his platelet counts have slowly trended down to 34K most recently in 2023.  He notes some mild easy bruising but denies any significant petechiae.  Denies any excessive bleeding episodes.  Denies any unexplained fevers, chills, night sweats, weight loss      Interval History:  Presents to clinic for follow-up.  Suffered a CVA in 2024.  Was also diagnosed with prostate cancer at the same time.  Following with Dr. Linn with plans for intervention in the near future.  Currently on an aspirin and Plavix and tolerating well.  Notes continued numbness in the right side but improved mobility    Oncology History:    Oncology/Hematology History    No history exists.       Subjective      Review of Systems:   Constitutional: Negative for fevers, chills, or weight loss  Eyes: Negative for blurred vision or discharge         Ear/Nose/Throat: Negative for  difficulty swallowing, sore throat, LAD                                                       Respiratory: Negative for cough, SOA, wheezing                                                                                        Cardiovascular: Negative for chest pain or palpitations                                                                  Gastrointestinal: Negative for nausea, vomiting or diarrhea                                                                     Genitourinary: Negative for dysuria or hematuria                                                                                           Musculoskeletal: Negative for any joint pains or muscle aches                                                                        Neurologic: Negative for any weakness, headaches, dizziness                                                                         Hematologic: Negative for any easy bleeding or bruising                                                                                   Psychiatric: Negative for anxiety or depression                          Past Medical History/Past Surgical History/ Family History/ Social History: Reviewed by me and unchanged from my previous documentation done on June 2024.     Medications:     Current Outpatient Medications:     aspirin 81 MG EC tablet, Take 1 tablet by mouth Daily., Disp: 30 tablet, Rfl: 5    atorvastatin (LIPITOR) 40 MG tablet, Take 1 tablet by mouth Daily., Disp: 90 tablet, Rfl: 1    clopidogrel (Plavix) 75 MG tablet, Take 1 tablet by mouth Daily., Disp: 30 tablet, Rfl: 5    Ferrous Sulfate ER 50 MG tablet controlled-release, Take 1 tablet by mouth Daily., Disp: 90 tablet, Rfl: 1    gabapentin (NEURONTIN) 400 MG capsule, Gabapentin 400 MG Oral Capsule; Patient Sig: Gabapentin 400 MG Oral Capsule ; 90; 0; 07-May-2015; Active, Disp: , Rfl:     lidocaine (LIDODERM) 5 %, , Disp: , Rfl:     lisinopril-hydrochlorothiazide (Zestoretic) 10-12.5 MG  "per tablet, Take 1 tablet by mouth Daily., Disp: 90 tablet, Rfl: 1    meclizine (ANTIVERT) 25 MG tablet, 1/2 to 1 pill as needed every 4 hours for dizziness, Disp: 40 tablet, Rfl: 1    Omega-3 Fatty Acids (Omega-3 Fish Oil) 500 MG capsule, Take 1 capsule by mouth Daily., Disp: , Rfl:     oxyCODONE-acetaminophen (PERCOCET) 7.5-325 MG per tablet, , Disp: , Rfl:     zolpidem CR (AMBIEN CR) 12.5 MG CR tablet, Take 1 tablet by mouth At Night As Needed for Sleep., Disp: 30 tablet, Rfl: 5    Allergies:   No Known Allergies    Objective     Physical Exam:  Vital Signs:   Vitals:    12/06/24 0950   BP: 173/98   Pulse: 92   Resp: 16   Temp: 97.7 °F (36.5 °C)   TempSrc: Temporal   SpO2: 99%   Weight: 77.2 kg (170 lb 4.8 oz)   Height: 170.2 cm (67.01\")   PainSc: 0-No pain     Pain Score    12/06/24 0950   PainSc: 0-No pain     ECOG Performance Status: 1 - Symptomatic but completely ambulatory    Constitutional: NAD, ECOG 1  Eyes: PERRLA, scleral anicteric  ENT: No LAD, no thyromegaly  Respiratory: CTAB, no wheezing, rales, rhonchi  Cardiovascular: RRR, no murmurs, pulses 2+ bilaterally  Abdomen: soft, NT/ND, no HSM  Musculoskeletal: strength 5/5 bilaterally, no c/c/e  Neurologic: A&O x 3, CN II-XII intact grossly    Results Review:   Lab on 12/06/2024   Component Date Value Ref Range Status    WBC 12/06/2024 7.54  3.40 - 10.80 10*3/mm3 Final    RBC 12/06/2024 4.41  4.14 - 5.80 10*6/mm3 Final    Hemoglobin 12/06/2024 13.5  13.0 - 17.7 g/dL Final    Hematocrit 12/06/2024 39.1  37.5 - 51.0 % Final    MCV 12/06/2024 88.7  79.0 - 97.0 fL Final    MCH 12/06/2024 30.6  26.6 - 33.0 pg Final    MCHC 12/06/2024 34.5  31.5 - 35.7 g/dL Final    RDW 12/06/2024 12.5  12.3 - 15.4 % Final    RDW-SD 12/06/2024 41.7  37.0 - 54.0 fl Final    MPV 12/06/2024 10.6  6.0 - 12.0 fL Final    Platelets 12/06/2024 42 (L)  140 - 450 10*3/mm3 Final    Neutrophil % 12/06/2024 65.1  42.7 - 76.0 % Final    Lymphocyte % 12/06/2024 25.7  19.6 - 45.3 % Final    " Monocyte % 12/06/2024 6.9  5.0 - 12.0 % Final    Eosinophil % 12/06/2024 1.7  0.3 - 6.2 % Final    Basophil % 12/06/2024 0.5  0.0 - 1.5 % Final    Immature Grans % 12/06/2024 0.1  0.0 - 0.5 % Final    Neutrophils, Absolute 12/06/2024 4.90  1.70 - 7.00 10*3/mm3 Final    Lymphocytes, Absolute 12/06/2024 1.94  0.70 - 3.10 10*3/mm3 Final    Monocytes, Absolute 12/06/2024 0.52  0.10 - 0.90 10*3/mm3 Final    Eosinophils, Absolute 12/06/2024 0.13  0.00 - 0.40 10*3/mm3 Final    Basophils, Absolute 12/06/2024 0.04  0.00 - 0.20 10*3/mm3 Final    Immature Grans, Absolute 12/06/2024 0.01  0.00 - 0.05 10*3/mm3 Final       CT Angiogram Head    Result Date: 11/7/2024  Narrative: CT ANGIOGRAM HEAD, CT ANGIOGRAM NECK Date of Exam: 11/7/2024 10:09 AM EST Indication: i65.22 i63.9. Comparison: None available. Technique: CTA of the head was performed after the uneventful intravenous administration of 95 mL Isovue-370. Reconstructed coronal and sagittal images were also obtained. In addition, a 3-D volume rendered image was created for interpretation. Automated  exposure control and iterative reconstruction methods were used. Findings: The lung apices are clear. Evaluation of the neck soft tissues demonstrates no pathologic cervical adenopathy or unexpected aerodigestive tract mass. The osseous structures demonstrate no evidence of acute fracture or aggressive osseous lesion, with multilevel spondylosis change present. Patent aortic arch with typical three-vessel branching. The visualized subclavian arteries are patent bilaterally. There is no significant atherosclerotic narrowing of the right ICA origin, 0% stenosis present by NASCET criteria bilaterally. There is complete occlusion of the left ICA from its origin through the entire cervical segment, consistent with provided history. The vertebral arteries appear normal in course and caliber bilaterally. Intracranially, the carotid siphons demonstrate calcific atherosclerosis with some  associated mild narrowing of the supraclinoid segment. The left ICA is occluded through the supraclinoid segment, with evidence of ECA to ICA collateralization ultimately via the ophthalmic artery. The anterior cerebral arteries appear normal in course and caliber. The right and left middle cerebral arteries demonstrate no evidence of high-grade stenosis or occlusion. The vertebral basilar system is patent. The posterior cerebral arteries are normal in course and caliber bilaterally.     Impression: Impression: Complete occlusion of the left ICA, appearing chronic, with evidence of ECA to ICA collateralization and reconstitution of the supraclinoid segment. There is no evidence of high-grade intracranial stenosis, large vessel occlusion or aneurysm. Electronically Signed: Sean Martin MD  11/7/2024 10:48 AM EST  Workstation ID: [id number]    CT Angiogram Neck    Result Date: 11/7/2024  Narrative: CT ANGIOGRAM HEAD, CT ANGIOGRAM NECK Date of Exam: 11/7/2024 10:09 AM EST Indication: i65.22 i63.9. Comparison: None available. Technique: CTA of the head was performed after the uneventful intravenous administration of 95 mL Isovue-370. Reconstructed coronal and sagittal images were also obtained. In addition, a 3-D volume rendered image was created for interpretation. Automated  exposure control and iterative reconstruction methods were used. Findings: The lung apices are clear. Evaluation of the neck soft tissues demonstrates no pathologic cervical adenopathy or unexpected aerodigestive tract mass. The osseous structures demonstrate no evidence of acute fracture or aggressive osseous lesion, with multilevel spondylosis change present. Patent aortic arch with typical three-vessel branching. The visualized subclavian arteries are patent bilaterally. There is no significant atherosclerotic narrowing of the right ICA origin, 0% stenosis present by NASCET criteria bilaterally. There is complete occlusion of the left ICA from  its origin through the entire cervical segment, consistent with provided history. The vertebral arteries appear normal in course and caliber bilaterally. Intracranially, the carotid siphons demonstrate calcific atherosclerosis with some associated mild narrowing of the supraclinoid segment. The left ICA is occluded through the supraclinoid segment, with evidence of ECA to ICA collateralization ultimately via the ophthalmic artery. The anterior cerebral arteries appear normal in course and caliber. The right and left middle cerebral arteries demonstrate no evidence of high-grade stenosis or occlusion. The vertebral basilar system is patent. The posterior cerebral arteries are normal in course and caliber bilaterally.     Impression: Impression: Complete occlusion of the left ICA, appearing chronic, with evidence of ECA to ICA collateralization and reconstitution of the supraclinoid segment. There is no evidence of high-grade intracranial stenosis, large vessel occlusion or aneurysm. Electronically Signed: Sean Martin MD  11/7/2024 10:48 AM EST  Workstation ID: [id number]     Assessment / Plan      Assessment/Plan:   1. Chronic ITP (idiopathic thrombocytopenia) (HCC) (Primary)  -Diagnosed about 6-7 years ago and was previously followed by Dr. Duggan in [place]  -Has previously been steroid responsive but is not been on any treatment over the past several years  -Most recent platelet count 34K in January 2023  -Platelet count 35K in June 2023  -Platelet count 44K in December 2023  -Platelet count 39K in June 2024  -Platelet count 42K in December 2024  -No indication for treatment at this time.  Should his platelet count drop below 30K, will consider pulse dose steroids  -Plan to repeat a CBC in 6 months.  Ordered today     2.  Iron deficiency anemia  -Iron studies stable on oral iron    3. Prostate cancer  -Initially presenting with a PSA of 6.8 in September 2024  -Biopsy at that time consistent with an  adenocarcinoma with a Elaine score 3+3 equal 6 in multiple cores  -Following with Dr. Campbell with plans for intervention in the near future    4. Cerebrovascular accident (CVA) due to occlusion of left middle cerebral artery  -Secondary to total left occluded cerebral artery  -Currently on aspirin and Plavix  -Schedule see neurology later this month         Follow Up:   Follow up in 6 months or sooner if needed     Garrick Cowan MD  Hematology and Oncology     Please note that portions of this note may have been completed with a voice recognition program. Efforts were made to edit the dictations, but occasionally words are mistranscribed.

## 2024-12-19 ENCOUNTER — OFFICE VISIT (OUTPATIENT)
Dept: NEUROLOGY | Facility: CLINIC | Age: 64
End: 2024-12-19
Payer: MEDICARE

## 2024-12-19 VITALS
OXYGEN SATURATION: 96 % | BODY MASS INDEX: 26.71 KG/M2 | DIASTOLIC BLOOD PRESSURE: 82 MMHG | WEIGHT: 170.19 LBS | HEART RATE: 99 BPM | HEIGHT: 67 IN | SYSTOLIC BLOOD PRESSURE: 126 MMHG

## 2024-12-19 DIAGNOSIS — I69.30 HISTORY OF CVA WITH RESIDUAL DEFICIT: Primary | ICD-10-CM

## 2024-12-19 DIAGNOSIS — R79.9 ABNORMAL FINDING OF BLOOD CHEMISTRY, UNSPECIFIED: ICD-10-CM

## 2024-12-19 DIAGNOSIS — R06.83 SNORING: ICD-10-CM

## 2024-12-19 NOTE — PROGRESS NOTES
Neuro Office Visit      Encounter Date: 2024   Patient Name: Huang Almonte  : 1960   MRN: [id number]   PCP:  Jeremías Brar MD     Chief Complaint:    Chief Complaint   Patient presents with    Numbness     RUE & RLE       History of Present Illness: Huang Almonte is a 64 y.o. male who is here today in Neurology for  numbness of RUE/RLE    PMH of anxiety, hypercholesterolemia, insomnia, HTN, stage 3a CKD, CVA, prostate cancer    MRI brain from 9/10/2024 showed areas of somewhat intermixed both acute and likely early subacute infarct within the left MCA territory involving the left insula and adjacent pre and postcentral regions.  Minimal localized edema, otherwise no evidence of hemorrhage or significant associated mass effect.  Carotid duplex performed on 10/30/2024  left internal carotid artery is occluded, right internal carotid artery demonstrates a less than 50% stenosis, antegrade right vertebral flow, antegrade left vertebral flow. CTA head and neck showed complete occlusion of left ICA, appearing chronic with evidence of ECA to ICA collateralization and reconstitution of the supraclinoid segment.  No evidence of high-grade intracranial stenosis, large vessel occlusion or aneurysm.  He underwent cardiac transthoracic echocardiogram on 10/30/2024 which showed ejection fraction of 51 to 55%, left ventricular diastolic function is consistent with grade 1 impaired relaxation, saline test results are negative, left atrium showed normal left atrial size and volume.  Lipid panel from 2024 showed total cholesterol 160, triglycerides 224, HDL 41, LDL 82.  He is currently taking atorvastatin 40 mg.  Patient was also seen by Becky PALACIOS with [facility] 2022 for for left carotid artery consultation at that time they ordered CT angiogram of head and neck to further assess extent of lesion and if intervention can be performed.  Plan was to follow-up with Dr. Hightower 1  week after to review results and see if patient would be a candidate for intervention. He was recommended to continue DAPT and statin at that time.   Of note patient also follows with Dr. Richard Cowan for chronic idiopathic thrombocytopenia, was diagnosed about 6 to 7 years ago, per review of his office visit notes he suffered CVA in September 2024 and was also diagnosed with prostate cancer around the same time.  He follows with Dr. Linn for prostate cancer with planed intervention in the near future.  He is currently on aspirin and Plavix and tolerating well, platelet count in December 2024 was 42K, should his platelet count drop below 30K will consider pulsed dose steroids, plan was to repeat CBC in 6 months.  Hematology is aware that patient is taking both aspirin and Plavix.    He reported new onset right hand numbness of weakness in September, he went to work that day, he is right handed, he also noted some coordination issues with the right hand, no weakness of that hand. Notes continued numbness of right arm/right leg along with right side of face that have improved some over time. No visual changes. He did have some expressive aphasia at that time which has improved. Speech has improved. No issues with swallowing or choking. No issues with gait, no falls.   Compliant with ASA and Plavix  No prior history of stroke, prior smoker, currently vapes, no known SYLVAIN - will sometimes wake up gasping for air and has been told before that he snores, no history of diabetes, rare alcohol use, no drug use  Notes some right arm tingling in addition to the numbness.      Subjective      Review of Systems   Constitutional: Negative.    HENT: Negative.     Eyes:  Negative for visual disturbance.   Respiratory:          Concern for potential SYLVAIN   Cardiovascular: Negative.    Gastrointestinal: Negative.    Skin: Negative.    Neurological:  Positive for speech difficulty and numbness. Negative for facial asymmetry and  weakness.   Psychiatric/Behavioral:  Positive for sleep disturbance.           Past Medical History:   Past Medical History:   Diagnosis Date    Cataract     Cerebrovascular accident (CVA) due to occlusion of left middle cerebral artery 10/08/2024    Some RUE dexterity issues      Dyspepsia     Essential hypertension 2020    Hemorrhoids     Hypercholesteremia     Prostate cancer 10/08/2024    Stage 3a chronic kidney disease 2024    Thrombocytopenia     TIA (transient ischemic attack) 10/14/2024       Past Surgical History:   Past Surgical History:   Procedure Laterality Date    NO PAST SURGERIES         Family History:   Family History   Problem Relation Age of Onset    Hypertension Mother     Heart attack Father        Social History:   Social History     Socioeconomic History    Marital status: Single   Tobacco Use    Smoking status: Former     Current packs/day: 0.00     Average packs/day: 1 pack/day for 40.0 years (40.0 ttl pk-yrs)     Types: Cigarettes     Start date: 1979     Quit date: 2019     Years since quittin.8     Passive exposure: Past    Smokeless tobacco: Never   Vaping Use    Vaping status: Every Day    Substances: Nicotine, Flavoring    Devices: Pre-filled or refillable cartridge    Passive vaping exposure: Yes   Substance and Sexual Activity    Alcohol use: Yes     Comment: trying to quit    Drug use: Never    Sexual activity: Defer     Comment: Single       Medications:     Current Outpatient Medications:     aspirin 81 MG EC tablet, Take 1 tablet by mouth Daily., Disp: 30 tablet, Rfl: 5    atorvastatin (LIPITOR) 40 MG tablet, Take 1 tablet by mouth Daily., Disp: 90 tablet, Rfl: 1    clopidogrel (Plavix) 75 MG tablet, Take 1 tablet by mouth Daily., Disp: 30 tablet, Rfl: 5    Ferrous Sulfate ER 50 MG tablet controlled-release, Take 1 tablet by mouth Daily., Disp: 90 tablet, Rfl: 1    gabapentin (NEURONTIN) 400 MG capsule, Gabapentin 400 MG Oral Capsule; Patient Sig:  "Gabapentin 400 MG Oral Capsule ; 90; 0; 07-May-2015; Active (Patient taking differently: Take 1 capsule by mouth 3 (Three) Times a Day.), Disp: , Rfl:     lidocaine (LIDODERM) 5 %, , Disp: , Rfl:     lisinopril-hydrochlorothiazide (Zestoretic) 10-12.5 MG per tablet, Take 1 tablet by mouth Daily., Disp: 90 tablet, Rfl: 1    multivitamin with minerals (MULTIVITAMIN ADULT PO), Take 1 tablet by mouth Daily., Disp: , Rfl:     Omega-3 Fatty Acids (Omega-3 Fish Oil) 500 MG capsule, Take 1 capsule by mouth Daily., Disp: , Rfl:     oxyCODONE-acetaminophen (PERCOCET) 7.5-325 MG per tablet, , Disp: , Rfl:     zolpidem CR (AMBIEN CR) 12.5 MG CR tablet, Take 1 tablet by mouth At Night As Needed for Sleep., Disp: 30 tablet, Rfl: 5    meclizine (ANTIVERT) 25 MG tablet, 1/2 to 1 pill as needed every 4 hours for dizziness (Patient not taking: Reported on 12/19/2024), Disp: 40 tablet, Rfl: 1    Allergies:   No Known Allergies      [facility], and patient is at MODERATE risk for falls. Assessment completed on:12/19/2024    Objective     Objective:    /82   Pulse 99   Ht 170.2 cm (67.01\")   Wt 77.2 kg (170 lb 3.1 oz)   SpO2 96%   BMI 26.65 kg/m²   Body mass index is 26.65 kg/m².    Physical Exam  Vitals reviewed.   Constitutional:       Appearance: Normal appearance.   HENT:      Head: Normocephalic and atraumatic.      Mouth/Throat:      Mouth: Mucous membranes are moist.      Pharynx: Oropharynx is clear.   Pulmonary:      Effort: Pulmonary effort is normal. No respiratory distress.   Skin:     General: Skin is warm and dry.   Neurological:      Mental Status: He is alert.          Neurology Exam:    General apperance: NAD.     Mental status: Alert, awake and oriented to time place and person.    Fund of knowledge:  Normal.     Language and Speech: No aphasia or dysarthria.    Naming , Repetition and Comprehension:  Can name objects, repeat a sentence and follow commands. Speech is clear and " fluent with good repetition, comprehension, and naming.    Cranial Nerves:   CN II: Visual fields are full. Intact. Pupils - PERRLA  CN III, IV and VI: Extraocular movements are intact. Normal saccades.   CN V: Facial sensation is intact.   CN VII: Muscles of facial expression reveal no asymmetry. Intact.   CN VIII: Hearing is intact.   CN IX and X: Palate elevates symmetrically. Intact  CN XI: Shoulder shrug is intact.   CN XII: Tongue is midline without evidence of atrophy or fasciculation.     Motor:  Right UE muscle strength 5/5. Normal tone.     Left UE muscle strength 5/5. Normal tone.      Right LE muscle strength 5/5. Normal tone.     Left LE muscle strength 5/5. Normal tone.      Sensory: Decreased sensation to light touch right side of face, right arm, right leg.     DTRs: 2+ bilaterally in upper and lower extremities.    Coordination: Normal finger-to-nose    Romberg: Negative.    Gait: Normal. No assistive device          Results:   Imaging:   CT Angiogram Head    Result Date: 11/7/2024  Impression: Complete occlusion of the left ICA, appearing chronic, with evidence of ECA to ICA collateralization and reconstitution of the supraclinoid segment. There is no evidence of high-grade intracranial stenosis, large vessel occlusion or aneurysm. Electronically Signed: Sean Martin MD  11/7/2024 10:48 AM EST  Workstation ID: [id number]    CT Angiogram Neck    Result Date: 11/7/2024  Impression: Complete occlusion of the left ICA, appearing chronic, with evidence of ECA to ICA collateralization and reconstitution of the supraclinoid segment. There is no evidence of high-grade intracranial stenosis, large vessel occlusion or aneurysm. Electronically Signed: Sean Martin MD  11/7/2024 10:48 AM EST  Workstation ID: [id number]    CT Chest Low Dose Cancer Screening WO    Result Date: 9/23/2024  Impression: Lung RADS 1, negative for malignancy. Yearly follow-up low-dose CT lung screening recommended. Electronically  Signed: Zulay Gore MD  9/23/2024 2:42 PM EDT  Workstation ID: [id number]    MRI Brain Without Contrast    Result Date: 9/10/2024  Impression: Areas of somewhat intermixed both acute and likely early subacute infarct are present within the left MCA territory, involving the left insula and adjacent pre and postcentral regions. There is minimal localized edema, otherwise without evidence of hemorrhage or significant associated mass effect. Electronically Signed: Sean Martin MD  9/10/2024 2:25 PM EDT  Workstation ID: [id number]       Labs:   Lab Results   Component Value Date    GLUCOSE 105 (H) 08/05/2024    BUN 19 08/05/2024    CREATININE 1.70 (H) 11/07/2024     08/05/2024    K 4.6 08/05/2024     08/05/2024    CALCIUM 9.6 08/05/2024    PROTEINTOT 7.4 02/24/2023    ALBUMIN 4.5 08/05/2024    ALT 22 08/05/2024    AST 22 08/05/2024    ALKPHOS 89 08/05/2024    BILITOT 0.4 08/05/2024    GLOB 2.7 02/24/2023    AGRATIO 1.7 02/24/2023    BCR 11.9 08/05/2024    ANIONGAP 11.0 02/24/2023    EGFR 53.7 (L) 02/24/2023     Lipid Panel          2/5/2024    12:43 8/5/2024    11:17   Lipid Panel   Total Cholesterol 160  160    Triglycerides 229  224    HDL Cholesterol 37  41    VLDL Cholesterol 38  37    LDL Cholesterol  85  82       Assessment / Plan      Assessment/Plan:   Diagnoses and all orders for this visit:    1. History of CVA with residual deficit (Primary)  -     Hemoglobin A1c; Future  -     Holter Monitor - 72 Hour Up To 15 Days; Future  -     Ambulatory Referral to Sleep Medicine  -     Ambulatory Referral to Neurology    2. Snoring  -     Ambulatory Referral to Sleep Medicine    3. Abnormal finding of blood chemistry, unspecified  -     Hemoglobin A1c; Future    Huang Almonte is in neurology clinic to establish care for left MCA territory CVA.  MRI brain from 9/10/2024 showed areas of somewhat intermixed both acute and likely early subacute infarct within the left MCA territory involving the left insula and  adjacent pre and postcentral regions.  Minimal localized edema, otherwise no evidence of hemorrhage or significant associated mass effect.  Carotid duplex performed on 10/30/2024  left internal carotid artery is occluded, right internal carotid artery demonstrates a less than 50% stenosis, antegrade right vertebral flow, antegrade left vertebral flow. CTA head and neck showed complete occlusion of left ICA, appearing chronic with evidence of ECA to ICA collateralization and reconstitution of the supraclinoid segment.  No evidence of high-grade intracranial stenosis, large vessel occlusion or aneurysm.  He underwent cardiac transthoracic echocardiogram on 10/30/2024 which showed ejection fraction of 51 to 55%, left ventricular diastolic function is consistent with grade 1 impaired relaxation, saline test results are negative, left atrium showed normal left atrial size and volume.  Lipid panel from 8/5/2024 showed total cholesterol 160, triglycerides 224, HDL 41, LDL 82. Will further perform stroke workup by checking hemoglobin A1c, holter monitor, and sleep study. Recommend continuation of Aspirin 81 mg daily and Plavix 75 mg daily plus Atorvastatin 40 mg daily for secondary stroke prevention. Recommended mediterranean diet and vaping cessation. In discussion with Dr. Gonzalez felt that etiology was likely from left carotid occlusion, patient was evaluated by vascular surgery and he was not deemed a candidate for intervention. We reviewed signs and symptoms of stroke and the importance of seeking emergent care for any stroke like symptoms. Goal A1c <7, goal LDL <70. I have also referred to stroke clinic for ongoing management. I have also discussed with Dr. Gonzalez who is in agreement with plan.     Patient Education:       Reviewed medications, potential side effects and signs and symptoms to report. Discussed risk versus benefits of treatment plan with patient and/or family-including medications, labs and radiology that  may be ordered. Addressed questions and concerns during visit. Patient and/or family verbalized understanding and agree with plan. Instructed to call the office with any questions and report to ER with any life-threatening symptoms.     Follow Up:   Referred to stroke clinic for ongoing management.     I spent  54  minutes in the care of this patient. I personally spent 50 percent of this time counseling and discussing diagnosis, diagnostic testing, evaluation, treatment options, and management .       During this visit the following were done:  Labs Reviewed [x]    Labs Ordered [x]    Radiology Reports Reviewed [x]    Radiology Ordered []    PCP Records Reviewed [x]    Referring Provider Records Reviewed []    ER Records Reviewed []    Hospital Records Reviewed []    History Obtained From Family []    Radiology Images Reviewed [x]    Other Reviewed []    Records Requested []      ANGELIKA Hardwick  [facility] [place] [facility]  [address] [address] [phone]

## 2025-01-08 ENCOUNTER — OFFICE VISIT (OUTPATIENT)
Dept: NEUROLOGY | Facility: CLINIC | Age: 65
End: 2025-01-08
Payer: MEDICARE

## 2025-01-08 VITALS
HEIGHT: 67 IN | HEART RATE: 112 BPM | OXYGEN SATURATION: 96 % | TEMPERATURE: 98.6 F | DIASTOLIC BLOOD PRESSURE: 78 MMHG | WEIGHT: 165 LBS | BODY MASS INDEX: 25.9 KG/M2 | SYSTOLIC BLOOD PRESSURE: 116 MMHG

## 2025-01-08 DIAGNOSIS — E78.00 HYPERCHOLESTEREMIA: ICD-10-CM

## 2025-01-08 DIAGNOSIS — I10 ESSENTIAL HYPERTENSION: ICD-10-CM

## 2025-01-08 DIAGNOSIS — I63.512 CEREBROVASCULAR ACCIDENT (CVA) DUE TO OCCLUSION OF LEFT MIDDLE CEREBRAL ARTERY: Primary | ICD-10-CM

## 2025-01-08 NOTE — PROGRESS NOTES
New Patient Office Visit      Encounter Date: 2025   Patient Name: Huang Almonte  : 1960   MRN: [id number]   PCP: Jeremías Brar MD    Referring Provider: ANGELIKA Hardwick     Chief Complaint:    Chief Complaint   Patient presents with    Establish Care       History of Present Illness: Huang Almonte is a 64 y.o. male who was referred to stroke clinic per Afua CARNEY from general neurology after her work up for numbness of RUE/RLE led to discovery of acute to subacute infarcts within the left MCA territory and subsequently found that patient had a chronic appearing left ICA occlusion. Please see Afua Mccurdy note below from initial work up:     PMH of anxiety, hypercholesterolemia, insomnia, HTN, stage 3a CKD, CVA, prostate cancer  MRI brain from 9/10/2024 showed areas of somewhat intermixed both acute and likely early subacute infarct within the left MCA territory involving the left insula and adjacent pre and postcentral regions.  Minimal localized edema, otherwise no evidence of hemorrhage or significant associated mass effect.  Carotid duplex performed on 10/30/2024  left internal carotid artery is occluded, right internal carotid artery demonstrates a less than 50% stenosis, antegrade right vertebral flow, antegrade left vertebral flow. CTA head and neck showed complete occlusion of left ICA, appearing chronic with evidence of ECA to ICA collateralization and reconstitution of the supraclinoid segment.  No evidence of high-grade intracranial stenosis, large vessel occlusion or aneurysm.  He underwent cardiac transthoracic echocardiogram on 10/30/2024 which showed ejection fraction of 51 to 55%, left ventricular diastolic function is consistent with grade 1 impaired relaxation, saline test results are negative, left atrium showed normal left atrial size and volume.  Lipid panel from 2024 showed total cholesterol 160, triglycerides 224, HDL 41, LDL 82.  He is currently taking  atorvastatin 40 mg.  Patient was also seen by Becky PALACIOS with [facility] 11/6/2022 for for left carotid artery consultation at that time they ordered CT angiogram of head and neck to further assess extent of lesion and if intervention can be performed.  Plan was to follow-up with Dr. Hightower 1 week after to review results and see if patient would be a candidate for intervention. He was recommended to continue DAPT and statin at that time.   Of note patient also follows with Dr. Richard Cowan for chronic idiopathic thrombocytopenia, was diagnosed about 6 to 7 years ago, per review of his office visit notes he suffered CVA in September 2024 and was also diagnosed with prostate cancer around the same time.  He follows with Dr. Linn for prostate cancer with planed intervention in the near future.  He is currently on aspirin and Plavix and tolerating well, platelet count in December 2024 was 42K, should his platelet count drop below 30K will consider pulsed dose steroids, plan was to repeat CBC in 6 months.  Hematology is aware that patient is taking both aspirin and Plavix.       Clinic visit 1/8/2025: Patient denies any new or worsening stroke like symptoms. He has been compliant with ASA and Plavix as well as with his statin. He has been seen by vascular surgery for evaluation of his left carotid occlusion and was not deemed to be an interventional candidate. He will be managed with maximal medical therapy. He will follow up with Dr. Hightower every 6 months with serial carotid U/S. The stroke clinic will take over in his secondary stroke preventative care and appreciate the work done by general neurology to intiate his work up. No changes will be made today to his plan of care. He will be tested for SYLVAIN in March and has been encouraged to wear a CPAP if needed. I have also encouraged him to follow through with the previously ordered Holter monitor. He is seeing cardiology today. No other  questions or concerns.       Stroke Risk Factors: carotid stenosis, hyperlipidemia, hypertension, and smoking      Subjective      Review of Systems:   Review of Systems   Constitutional:  Positive for fatigue. Negative for activity change, appetite change, chills, diaphoresis, fever, unexpected weight gain and unexpected weight loss.   Eyes:  Negative for blurred vision, double vision, photophobia and visual disturbance.   Respiratory:  Negative for shortness of breath.    Cardiovascular:  Negative for chest pain, palpitations and leg swelling.   Gastrointestinal:  Negative for blood in stool, nausea and vomiting.   Musculoskeletal:  Negative for gait problem.   Neurological:  Positive for speech difficulty and numbness. Negative for dizziness, tremors, syncope, facial asymmetry, weakness, light-headedness, headache and memory problem.       Past Medical History:   Past Medical History:   Diagnosis Date    Cataract     Cerebrovascular accident (CVA) due to occlusion of left middle cerebral artery 10/08/2024    Some RUE dexterity issues      Dyspepsia     Essential hypertension 2020    Hemorrhoids     Hypercholesteremia     Prostate cancer 10/08/2024    Stage 3a chronic kidney disease 2024    Thrombocytopenia     TIA (transient ischemic attack) 10/14/2024       Past Surgical History:   Past Surgical History:   Procedure Laterality Date    NO PAST SURGERIES         Family History:   Family History   Problem Relation Age of Onset    Hypertension Mother     Heart attack Father        Social History:   Social History     Socioeconomic History    Marital status: Single   Tobacco Use    Smoking status: Former     Current packs/day: 0.00     Average packs/day: 1 pack/day for 40.0 years (40.0 ttl pk-yrs)     Types: Cigarettes     Start date: 1979     Quit date: 2019     Years since quittin.9     Passive exposure: Past    Smokeless tobacco: Never   Vaping Use    Vaping status: Every Day    Substances:  "Nicotine, Flavoring    Devices: Disposable, Pre-filled or refillable cartridge    Passive vaping exposure: Yes   Substance and Sexual Activity    Alcohol use: Yes     Comment: trying to quit    Drug use: Never    Sexual activity: Defer     Comment: Single       Medications:     Current Outpatient Medications:     aspirin 81 MG EC tablet, Take 1 tablet by mouth Daily., Disp: 30 tablet, Rfl: 5    atorvastatin (LIPITOR) 40 MG tablet, Take 1 tablet by mouth Daily., Disp: 90 tablet, Rfl: 1    clopidogrel (Plavix) 75 MG tablet, Take 1 tablet by mouth Daily., Disp: 30 tablet, Rfl: 5    Ferrous Sulfate ER 50 MG tablet controlled-release, Take 1 tablet by mouth Daily., Disp: 90 tablet, Rfl: 1    gabapentin (NEURONTIN) 400 MG capsule, Gabapentin 400 MG Oral Capsule; Patient Sig: Gabapentin 400 MG Oral Capsule ; 90; 0; 07-May-2015; Active (Patient taking differently: Take 1 capsule by mouth 3 (Three) Times a Day.), Disp: , Rfl:     lidocaine (LIDODERM) 5 %, , Disp: , Rfl:     lisinopril-hydrochlorothiazide (Zestoretic) 10-12.5 MG per tablet, Take 1 tablet by mouth Daily., Disp: 90 tablet, Rfl: 1    multivitamin with minerals (MULTIVITAMIN ADULT PO), Take 1 tablet by mouth Daily., Disp: , Rfl:     Omega-3 Fatty Acids (Omega-3 Fish Oil) 500 MG capsule, Take 1 capsule by mouth Daily., Disp: , Rfl:     oxyCODONE-acetaminophen (PERCOCET) 7.5-325 MG per tablet, , Disp: , Rfl:     zolpidem CR (AMBIEN CR) 12.5 MG CR tablet, Take 1 tablet by mouth At Night As Needed for Sleep., Disp: 30 tablet, Rfl: 5    meclizine (ANTIVERT) 25 MG tablet, 1/2 to 1 pill as needed every 4 hours for dizziness (Patient not taking: Reported on 12/19/2024), Disp: 40 tablet, Rfl: 1    Allergies:   No Known Allergies    Objective     Physical Exam:  Vital Signs:   Vitals:    01/08/25 1019   BP: 116/78   Pulse: 112   Temp: 98.6 °F (37 °C)   SpO2: 96%   Weight: 74.8 kg (165 lb)   Height: 170.2 cm (67.01\")     Body mass index is 25.84 kg/m².     Physical " Exam  Vitals and nursing note reviewed.   Constitutional:       General: He is awake. He is not in acute distress.     Appearance: Normal appearance. He is normal weight. He is not ill-appearing.      Comments:  male    HENT:      Head: Normocephalic and atraumatic.      Nose: Nose normal.      Mouth/Throat:      Mouth: Mucous membranes are moist.   Eyes:      General: Lids are normal.      Extraocular Movements: Extraocular movements intact.      Pupils: Pupils are equal, round, and reactive to light.   Cardiovascular:      Rate and Rhythm: Normal rate and regular rhythm.      Pulses: Normal pulses.   Pulmonary:      Effort: Pulmonary effort is normal. No respiratory distress.   Skin:     General: Skin is warm and dry.   Neurological:      Mental Status: He is alert and oriented to person, place, and time.      Cranial Nerves: No cranial nerve deficit.      Sensory: Sensory deficit present.      Motor: Motor strength is normal.No weakness.      Coordination: Coordination normal.      Gait: Gait normal.   Psychiatric:         Mood and Affect: Mood normal.         Speech: Speech normal.         Behavior: Behavior normal.       Neurological Exam  Mental Status  Awake and alert. Oriented to person, place, time and situation. Oriented to person, place, and time. Speech is normal. Expressive aphasia present. Language: Occasional word finding issues . Attention and concentration are normal. Fund of knowledge is appropriate for level of education.    Cranial Nerves  CN II: Right visual acuity: Counts fingers. Left visual acuity: Counts fingers. Visual fields full to confrontation.  CN III, IV, VI: Extraocular movements intact bilaterally. Normal lids and orbits bilaterally. Pupils equal round and reactive to light bilaterally.  CN V:  Right: Diminished sensation of the entire right side of the face.  Left: Facial sensation is normal on the left.  CN VII: Full and symmetric facial movement.  CN VIII: Hearing is  normal to speech .  CN XI: Shoulder shrug strength is normal.  CN XII: Tongue midline without atrophy or fasciculations.    Motor  Normal muscle bulk throughout. No fasciculations present. Normal muscle tone. Strength is 5/5 throughout all four extremities.    Sensory  Light touch abnormality: Diminished sensation on the right face, arm and leg .     Coordination  Right: Finger-to-nose normal.  No obvious dysmetria .    Gait   Normal gait.Casual gait is normal including stance, stride, and arm swing.     NIH 1- sensory     Modified Tamra Score: 1        0  No Symptoms    1 No significant disability. Able to carry out all usual activities, despite some symptoms.    2 Slight disability. Able to look after own affairs without assistance, but unable to carry out all previous activities.    3 Moderate disability. Requires some help, but able to walk unassisted.    4 Moderately severe disability. Unable to attend to own bodily needs without assistance, and unable to walk unassisted.    5 Severe disability. Requires constant nursing care and attention, bedridden, incontinent.    6 Dead       PHQ-9 Depression Screening  Little interest or pleasure in doing things? Not at all   Feeling down, depressed, or hopeless? Several days   PHQ-2 Total Score 1   Trouble falling or staying asleep, or sleeping too much?     Feeling tired or having little energy?     Poor appetite or overeating?     Feeling bad about yourself - or that you are a failure or have let yourself or your family down?     Trouble concentrating on things, such as reading the newspaper or watching television?     Moving or speaking so slowly that other people could have noticed? Or the opposite - being so fidgety or restless that you have been moving around a lot more than usual?     Thoughts that you would be better off dead, or of hurting yourself in some way?     PHQ-9 Total Score     If you checked off any problems, how difficult have these problems made it  "for you to do your work, take care of things at home, or get along with other people?         STOP-Bang Score  Have you been diagnosed with Sleep Apnea?: no  Snoring?: yes  Tired?: no  Observed?: yes  Pressure?: yes  Stop Score: 3  Body Mass Index more than 35 kg/m2?: no  Age older than 50 year old?: yes  Neck large? \">17\"/43cm-M, >16\"/41cm-F: no  Gender=Male?: yes  Total Stop-Bang Score: 5       STEADI Fall Risk Clinician Key Questions   Have you fallen in the past year?: Yes (pt states 2 times)      Imaging Reviewed:   CT Angiogram Head    Result Date: 11/7/2024  Impression: Complete occlusion of the left ICA, appearing chronic, with evidence of ECA to ICA collateralization and reconstitution of the supraclinoid segment. There is no evidence of high-grade intracranial stenosis, large vessel occlusion or aneurysm. Electronically Signed: Sean Martin MD  11/7/2024 10:48 AM EST  Workstation ID: [id number]    CT Angiogram Neck    Result Date: 11/7/2024  Impression: Complete occlusion of the left ICA, appearing chronic, with evidence of ECA to ICA collateralization and reconstitution of the supraclinoid segment. There is no evidence of high-grade intracranial stenosis, large vessel occlusion or aneurysm. Electronically Signed: Sean Martin MD  11/7/2024 10:48 AM EST  Workstation ID: [id number]      Laboratory Results:   Hemoglobin   Date Value Ref Range Status   12/06/2024 13.5 13.0 - 17.7 g/dL Final     Hematocrit   Date Value Ref Range Status   12/06/2024 39.1 37.5 - 51.0 % Final     Platelets   Date Value Ref Range Status   12/06/2024 42 (L) 140 - 450 10*3/mm3 Final     LDL Chol Calc (NIH)   Date Value Ref Range Status   08/05/2024 82 0 - 100 mg/dL Final     AST (SGOT)   Date Value Ref Range Status   08/05/2024 22 1 - 40 U/L Final   02/24/2023 23 1 - 40 U/L Final     ALT (SGPT)   Date Value Ref Range Status   08/05/2024 22 1 - 41 U/L Final   02/24/2023 20 1 - 41 U/L Final             Assessment / Plan  "     Assessment/Plan:   Diagnoses and all orders for this visit:    1. Cerebrovascular accident (CVA) due to occlusion of left middle cerebral artery (Primary)  -Continue dual antiplatelet therapy with aspirin 81 mg and Plavix 75 mg daily  -Continue high intensity statin  -Normal blood pressure goals  -Continue to follow-up with vascular surgery, Dr. Hightower, every 6 months for repeat carotid ultrasounds as previously scheduled.  Patient has been told he is not interventional candidate and will be maximally medically managed.  -Holter monitor has been ordered but patient has not arranged to wear this yet.  He is meeting with cardiology today.  I encouraged him to at least complete a 14-day monitor.  He is in agreement.  -Patient is scheduled for obstructive sleep apnea testing in March.  I have encouraged him to be compliant with his CPAP if deemed necessary.  -Encouraged patient to make lifestyle modifications with heart healthy diet and increased activity  -Reviewed signs and symptoms of stroke and when to call 911  -Follow-up in the stroke clinic in approximately 6 months.  If stable at that time can transition to annual visits.    2. Essential hypertension  -BP goals, <130/80  -Avoid hypotension  -Today's /78    3. HLD  -LDL 82  -Goal less than 70   -continue Lipitor 40 mg nightly       Discussed the importance of medication compliance and lifestyle modifications (adequate blood pressure control, adequate control of hyperlipidemia, adequate glycemic control, increase physical activity, and healthy diet) to help reduce the risk of future cerebrovascular events.  Also discussed the signs symptoms that would warrant the patient return back to the emergency department including unilateral weakness, unilateral numbness, visual disturbances, loss of balance, speech difficulties, and/or a sudden severe headache.      Follow Up:   Return in about 6 months (around 7/8/2025).    ANGELIKA Carnes  [facility]  Stroke

## 2025-01-09 ENCOUNTER — HOSPITAL ENCOUNTER (OUTPATIENT)
Dept: CARDIOLOGY | Facility: HOSPITAL | Age: 65
Discharge: HOME | End: 2025-01-09
Payer: MEDICARE

## 2025-01-09 ENCOUNTER — OFFICE VISIT (OUTPATIENT)
Dept: CARDIOLOGY | Facility: HOSPITAL | Age: 65
End: 2025-01-09
Payer: MEDICARE

## 2025-01-09 ENCOUNTER — TELEPHONE (OUTPATIENT)
Dept: CARDIOLOGY | Facility: HOSPITAL | Age: 65
End: 2025-01-09

## 2025-01-09 VITALS
HEIGHT: 67 IN | BODY MASS INDEX: 25.61 KG/M2 | RESPIRATION RATE: 18 BRPM | HEART RATE: 92 BPM | WEIGHT: 163.19 LBS | SYSTOLIC BLOOD PRESSURE: 128 MMHG | DIASTOLIC BLOOD PRESSURE: 78 MMHG | OXYGEN SATURATION: 98 %

## 2025-01-09 DIAGNOSIS — I10 PRIMARY HYPERTENSION: ICD-10-CM

## 2025-01-09 DIAGNOSIS — Z86.73 HISTORY OF CVA (CEREBROVASCULAR ACCIDENT): ICD-10-CM

## 2025-01-09 DIAGNOSIS — E78.5 HYPERLIPIDEMIA, UNSPECIFIED HYPERLIPIDEMIA TYPE: ICD-10-CM

## 2025-01-09 DIAGNOSIS — I77.9 BILATERAL CAROTID ARTERY DISEASE, UNSPECIFIED TYPE: ICD-10-CM

## 2025-01-09 DIAGNOSIS — R00.2 PALPITATIONS: Primary | ICD-10-CM

## 2025-01-09 DIAGNOSIS — R00.2 PALPITATIONS: ICD-10-CM

## 2025-01-09 DIAGNOSIS — Z91.89 AT RISK FOR SLEEP APNEA: ICD-10-CM

## 2025-01-09 NOTE — PROGRESS NOTES
[facility] Heart Monitor Documentation    Huang Almonte  1960  [id number]  01/09/25      [] ZIO XT Patch  Model [id number] Prescribed for  Days    Serial Number: (N + 9 Digits) N   Apply-By Date on Box:   USPS Tracking Number:   USPS Tracking        [x] [employer] MINI PLUS Mobile Cardiac Telemetry  Model BGMINIPLUS Prescribed for 30 Days    Serial Number: (BGM + 7 Digits) [id number]  Shipped-By Date on Box: 12/3/24  UPS Tracking Number: 1Z  UPS Tracking      [] [employer] MINI Holter Monitor  Model BGMINIEL Prescribed for  Days    Serial Number: (7 Digits)   Shipped-By Date on Box:   UPS Tracking Number: 1Z  UPS Tracking        This monitor was applied to the patient's chest and checked for proper functioning.  Mr. Huang Almonte was instructed in the proper use of this monitor.  He was given the opportunity to ask questions and left the office with the device 's instruction manual.    Mary Ramon MA, 09:33 EST, 01/09/25                  [facility]

## 2025-01-09 NOTE — PROGRESS NOTES
"[facility] and V[facility]    Chief Complaint  Stroke    Subjective    History of Present Illness {  Problem List  Visit  Diagnosis   Encounters  Notes  Medications  Labs  Result Review Imaging  Media :23}     Huang Almonte presents to [facility] CARDIOLOGY for   History of Present Illness     64-year-old male with a history of anxiety, hypercholesterolemia, insomnia/sleep disturbance, hypertension, stage III chronic kidney disease, prostate CA, history of CVA, carotid artery disease, chronic idiopathic thrombocytopenia.    CVA identified on MRI of the brain, 9/10/2024:    Carotid duplex 10/30/2024: LICA occluded, R ICA less than 50% stenosis.  CTA of the head and neck showed occlusion of left ICA appearing chronic with evidence of ECA to ICA collateralization and reconstruction of the supraclinoid segment.    Probable etiology for CVA related to carotid disease.    Echocardiogram 10/30/2024: EF 51 to 55%, grade 1 diastolic dysfunction, saline test negative, LA showed normal left atrial size and volume.    With history of sleep disturbances sleep referral has been completed.    Rare palpitations.  Occasional racing.      NO CP or pressure, dyspnea.  Occasional dizziness, no near syncope, syncope.  No edema.     Will be having urology procedure in 1 weeks.     Pt under increased stress due to health concerns, limited resources, ect.    Continued right sidedness numbness but improved.     Objective     Vital Signs:   Vitals:    01/09/25 0859 01/09/25 0900 01/09/25 0926   BP: 139/87 147/83 128/78   BP Location: Left arm Left arm    Patient Position: Standing Sitting    Cuff Size: Adult Adult    Pulse: 91 92    Resp:  18    SpO2: 99% 98%    Weight:  74 kg (163 lb 3 oz)    Height:  170.2 cm (67.01\")      Body mass index is 25.55 kg/m².  Physical Exam  Vitals reviewed.   Constitutional:       General: He is not in acute distress.  Cardiovascular:      " Rate and Rhythm: Normal rate and regular rhythm.      Heart sounds: No murmur heard.  Pulmonary:      Effort: Pulmonary effort is normal.      Breath sounds: Normal breath sounds.   Musculoskeletal:      Right lower leg: No edema.      Left lower leg: No edema.   Skin:     Coloration: Skin is not pale.   Neurological:      Mental Status: He is alert.   Psychiatric:         Mood and Affect: Mood normal.         Behavior: Behavior normal. Behavior is cooperative.              Result Review  Data Reviewed:{ Labs  Result Review  Imaging  Med Tab  Media :23}   Echocardiogram 10/30/2024: EF 51 to 55%, grade 1 diastolic dysfunction, saline test negative    Carotid duplex 10/30/2024: LICA occluded, R ICA less than 50%    Lab Results   Component Value Date    WBC 7.54 12/06/2024    HGB 13.5 12/06/2024    HCT 39.1 12/06/2024    MCV 88.7 12/06/2024    PLT 42 (L) 12/06/2024     Lab Results   Component Value Date    GLUCOSE 105 (H) 08/05/2024    BUN 19 08/05/2024    CREATININE 1.70 (H) 11/07/2024     08/05/2024    K 4.6 08/05/2024     08/05/2024    CALCIUM 9.6 08/05/2024    PROTEINTOT 7.4 02/24/2023    ALBUMIN 4.5 08/05/2024    ALT 22 08/05/2024    AST 22 08/05/2024    ALKPHOS 89 08/05/2024    BILITOT 0.4 08/05/2024    GLOB 2.7 02/24/2023    AGRATIO 1.7 02/24/2023    BCR 11.9 08/05/2024    ANIONGAP 11.0 02/24/2023    EGFR 53.7 (L) 02/24/2023     Lab Results   Component Value Date    CHLPL 160 08/05/2024    CHLPL 160 02/05/2024    CHLPL 177 07/25/2023     Lab Results   Component Value Date    TRIG 224 (H) 08/05/2024    TRIG 229 (H) 02/05/2024    TRIG 263 (H) 07/25/2023     Lab Results   Component Value Date    HDL 41 08/05/2024    HDL 37 (L) 02/05/2024    HDL 41 07/25/2023     Lab Results   Component Value Date    LDL 82 08/05/2024    LDL 85 02/05/2024    LDL 92 07/25/2023                   Assessment and Plan {CC Problem List  Visit Diagnosis  ROS  Review (Popup)  Health Maintenance  Quality  BestPractice   Medications  SmartSets  SnapShot Encounters  Media :23}   1. Palpitations  No history of arrhythmias.  Rare palpitations reported    - Cardiac Event Monitor (DYLAN) or Mobile Cardiac Outpatient Telemetry (MCT); Future    2. Primary hypertension  History of dizziness and near syncope that improved with changing hypertensive medications.    Blood pressure controlled today.  Currently on lisinopril hydrochlorothiazide.    3. Hyperlipidemia, unspecified hyperlipidemia type  Statin    4. Bilateral carotid artery disease, unspecified type  LICA occlusion with collaterals, R ICA less than 50%  Statin, aspirin    5. History of CVA (cerebrovascular accident)  Per neurology note.  Etiology felt to be related to carotid disease  Aspirin, statin, Plavix    Will check heart monitor to rule out A-fib as a contributing factor  - Cardiac Event Monitor (DYLAN) or Mobile Cardiac Outpatient Telemetry (MCT); Future    6. At risk for sleep apnea  Patient with history of snoring, poor sleep quality, history of a.m. headaches  Has been referred for sleep study.          Follow Up {Instructions Charge Capture  Follow-up Communications :23}   Return in about 6 weeks (around 2/20/2025), or if symptoms worsen or fail to improve, for palpitations, monitor results, hx CVA (cardotid disease).    Patient was given instructions and counseling regarding his condition or for health maintenance advice. Please see specific information pulled into the AVS if appropriate.  Patient was instructed to call the [facility] with any questions, concerns, or worsening symptoms.

## 2025-01-09 NOTE — TELEPHONE ENCOUNTER
Patient called office, having a urology procedure next Friday 1/17/25, needs to know how many days prior to stop Plavix and 81 mg aspirin. Urology advised him 5 days and he wanted to confirm.

## 2025-01-13 ENCOUNTER — TELEPHONE (OUTPATIENT)
Dept: NEUROLOGY | Facility: CLINIC | Age: 65
End: 2025-01-13
Payer: MEDICARE

## 2025-01-13 NOTE — TELEPHONE ENCOUNTER
Caller: Huang Almonte    Relationship: Self    Best call back number: [phone]    What is the best time to reach you:     Who are you requesting to speak with (clinical staff, provider,  specific staff member): STROKE CLINIC    Do you know the name of the person who called:     What was the call regarding:   ABOUT A WEEK OR SO AGO, THE PT MISSED A CALL FROM THIS OFFICE.    PLEASE CALL PT BACK IF STILL NEEDED.

## 2025-02-03 NOTE — PROGRESS NOTES
The ABCs of the Annual Wellness Visit  Subsequent Medicare Wellness Visit    Chief Complaint   Patient presents with   • Hypertension   • Med Refill       Subjective   History of Present Illness:  Huang Almonte is a 60 y.o. male who presents for a Subsequent Medicare Wellness Visit.    HEALTH RISK ASSESSMENT    Recent Hospitalizations:  No hospitalization(s) within the last year.    Current Medical Providers:  Patient Care Team:  Jeremías Brar MD as PCP - General    Smoking Status:  Social History     Tobacco Use   Smoking Status Former Smoker   • Packs/day: 1.00   • Years: 40.00   • Pack years: 40.00   • Types: Cigarettes   • Quit date: 2019   • Years since quittin.5   Smokeless Tobacco Never Used       Alcohol Consumption:  Social History     Substance and Sexual Activity   Alcohol Use Yes    Comment: trying to quit       Depression Screen:   PHQ-2/PHQ-9 Depression Screening 2021   Little interest or pleasure in doing things 0   Feeling down, depressed, or hopeless 1   Trouble falling or staying asleep, or sleeping too much -   Feeling tired or having little energy -   Poor appetite or overeating -   Feeling bad about yourself - or that you are a failure or have let yourself or your family down -   Trouble concentrating on things, such as reading the newspaper or watching television -   Moving or speaking so slowly that other people could have noticed. Or the opposite - being so fidgety or restless that you have been moving around a lot more than usual -   Thoughts that you would be better off dead, or of hurting yourself in some way -   Total Score 1   If you checked off any problems, how difficult have these problems made it for you to do your work, take care of things at home, or get along with other people? -       Fall Risk Screen:  STEADI Fall Risk Assessment has not been completed.    Health Habits and Functional and Cognitive Screening:  Functional & Cognitive Status 2021   Do you have  difficulty preparing food and eating? No   Do you have difficulty bathing yourself, getting dressed or grooming yourself? Yes   Do you have difficulty using the toilet? No   Do you have difficulty moving around from place to place? No   Do you have trouble with steps or getting out of a bed or a chair? Yes   Current Diet Unhealthy Diet   Dental Exam Up to date   Eye Exam Not up to date   Exercise (times per week) 0 times per week   Current Exercises Include No Regular Exercise   Current Exercise Activities Include -   Do you need help using the phone?  No   Are you deaf or do you have serious difficulty hearing?  No   Do you need help with transportation? No   Do you need help shopping? No   Do you need help preparing meals?  No   Do you need help with housework?  Yes   Do you need help with laundry? Yes   Do you need help taking your medications? No   Do you need help managing money? No   Do you ever drive or ride in a car without wearing a seat belt? No   Have you felt unusual stress, anger or loneliness in the last month? No   Who do you live with? Alone   If you need help, do you have trouble finding someone available to you? No   Have you been bothered in the last four weeks by sexual problems? No   Do you have difficulty concentrating, remembering or making decisions? No         Does the patient have evidence of cognitive impairment? No      Age-appropriate Screening Schedule:  Refer to the list below for future screening recommendations based on patient's age, sex and/or medical conditions. Orders for these recommended tests are listed in the plan section. The patient has been provided with a written plan.    Health Maintenance   Topic Date Due   • ZOSTER VACCINE (1 of 2) Never done   • INFLUENZA VACCINE  10/01/2021   • LIPID PANEL  06/22/2022   • TDAP/TD VACCINES (2 - Td or Tdap) 10/16/2028          The following portions of the patient's history were reviewed and updated as appropriate: allergies, current  medications, past family history, past medical history, past social history, past surgical history and problem list.    Outpatient Medications Prior to Visit   Medication Sig Dispense Refill   • aspirin 81 MG EC tablet Take 1 tablet by mouth Daily. 30 tablet 11   • desvenlafaxine (Pristiq) 50 MG 24 hr tablet Take 1 tablet by mouth Daily. 30 tablet 5   • fexofenadine (Allegra Allergy) 180 MG tablet Take 1 tablet by mouth Daily. 30 tablet 3   • gabapentin (NEURONTIN) 400 MG capsule Gabapentin 400 MG Oral Capsule; Patient Sig: Gabapentin 400 MG Oral Capsule ; 90; 0; 07-May-2015; Active     • lisinopril-hydrochlorothiazide (PRINZIDE,ZESTORETIC) 20-12.5 MG per tablet TAKE ONE TABLET BY MOUTH DAILY 30 tablet 0   • Omega-3 Fatty Acids (Omega-3 Fish Oil) 500 MG capsule Take 500 mg by mouth Daily.     • oxyCODONE-acetaminophen (PERCOCET) 7.5-325 MG per tablet      • zolpidem CR (AMBIEN CR) 12.5 MG CR tablet Take 1 tablet by mouth At Night As Needed for Sleep. 30 tablet 5   • atenolol (Tenormin) 25 MG tablet Take 1 tablet by mouth Daily. 30 tablet 2     No facility-administered medications prior to visit.       Patient Active Problem List   Diagnosis   • Back pain   • Generalized anxiety disorder   • Essential hypertriglyceridemia   • Current every day smoker   • Insomnia   • Cataract   • Chronic ITP (idiopathic thrombocytopenia) (CMS/HCC)   • Hypogonadism in male   • Essential hypertension   • Post-nasal discharge       Advanced Care Planning:  ACP discussion was held with the patient during this visit. Patient does not have an advance directive, information provided.    Review of Systems   Constitutional: Negative.    HENT: Negative.    Eyes: Negative.    Respiratory: Negative.  Negative for shortness of breath.    Cardiovascular: Negative.  Negative for chest pain.   Gastrointestinal: Negative.    Musculoskeletal: Negative.    Skin: Negative.    Neurological: Negative.    Psychiatric/Behavioral: Positive for dysphoric  "mood. The patient is not nervous/anxious.    All other systems reviewed and are negative.      Compared to one year ago, the patient feels his physical health is the same.  Compared to one year ago, the patient feels his mental health is the same.    Reviewed chart for potential of high risk medication in the elderly: yes  Reviewed chart for potential of harmful drug interactions in the elderly:yes    Objective         Vitals:    08/23/21 0845   BP: 138/84   Pulse: 76   Resp: 18   Temp: 97.8 °F (36.6 °C)   Weight: 78.5 kg (173 lb)   Height: 175.3 cm (69\")       Body mass index is 25.55 kg/m².  Discussed the patient's BMI with him. The BMI is in the acceptable range.    Physical Exam  Vitals and nursing note reviewed.   Constitutional:       General: He is not in acute distress.     Appearance: Normal appearance. He is well-developed.   HENT:      Head: Normocephalic and atraumatic.   Eyes:      Extraocular Movements: Extraocular movements intact.      Conjunctiva/sclera: Conjunctivae normal.   Cardiovascular:      Rate and Rhythm: Normal rate and regular rhythm.      Heart sounds: Normal heart sounds.   Pulmonary:      Effort: Pulmonary effort is normal.      Breath sounds: Normal breath sounds.   Abdominal:      General: Abdomen is flat. Bowel sounds are normal. There is no distension.      Palpations: Abdomen is soft.      Tenderness: There is no abdominal tenderness. There is no guarding or rebound.   Neurological:      Mental Status: He is alert and oriented to person, place, and time.   Psychiatric:         Mood and Affect: Mood normal.         Behavior: Behavior normal.         Thought Content: Thought content normal.         Judgment: Judgment normal.         Lab Results   Component Value Date    GLU 93 06/22/2021    CHLPL 218 (H) 06/22/2021    TRIG 341 (H) 06/22/2021    HDL 40 06/22/2021     (H) 06/22/2021    VLDL 60 (H) 06/22/2021        Assessment/Plan   Medicare Risks and Personalized Health " Plan  CMS Preventative Services Quick Reference  Advance Directive Discussion  Chronic Pain   Depression/Dysphoria  Fall Risk  Inactivity/Sedentary    The above risks/problems have been discussed with the patient.  Pertinent information has been shared with the patient in the After Visit Summary.  Follow up plans and orders are seen below in the Assessment/Plan Section.    Diagnoses and all orders for this visit:    1. Medicare annual wellness visit, subsequent (Primary)    2. Essential hypertension  -     atenolol (Tenormin) 50 MG tablet; Take 1 tablet by mouth Daily.  Dispense: 30 tablet; Refill: 5    3. Generalized anxiety disorder      Counseled about well adult care including diet and exercise as well as above health issues    BP too high, will increase atenolol from 25 to 50 and he will call back with BP in future    Follow Up:  Return in about 19 weeks (around 1/3/2022).     An After Visit Summary and PPPS were given to the patient.                Denise Guzman RN to Denise Guzman RN to CHELY Sandoval RN

## 2025-02-05 ENCOUNTER — OFFICE VISIT (OUTPATIENT)
Dept: FAMILY MEDICINE CLINIC | Facility: CLINIC | Age: 65
End: 2025-02-05
Payer: MEDICARE

## 2025-02-05 VITALS
BODY MASS INDEX: 25.43 KG/M2 | OXYGEN SATURATION: 98 % | HEART RATE: 80 BPM | DIASTOLIC BLOOD PRESSURE: 70 MMHG | HEIGHT: 67 IN | WEIGHT: 162 LBS | TEMPERATURE: 97.9 F | RESPIRATION RATE: 16 BRPM | SYSTOLIC BLOOD PRESSURE: 130 MMHG

## 2025-02-05 DIAGNOSIS — F17.200 CURRENT EVERY DAY SMOKER: ICD-10-CM

## 2025-02-05 DIAGNOSIS — C61 PROSTATE CANCER: ICD-10-CM

## 2025-02-05 DIAGNOSIS — G47.09 OTHER INSOMNIA: ICD-10-CM

## 2025-02-05 DIAGNOSIS — Z00.00 MEDICARE ANNUAL WELLNESS VISIT, SUBSEQUENT: Primary | ICD-10-CM

## 2025-02-05 DIAGNOSIS — D50.9 IRON DEFICIENCY ANEMIA, UNSPECIFIED IRON DEFICIENCY ANEMIA TYPE: ICD-10-CM

## 2025-02-05 DIAGNOSIS — I63.512 CEREBROVASCULAR ACCIDENT (CVA) DUE TO OCCLUSION OF LEFT MIDDLE CEREBRAL ARTERY: ICD-10-CM

## 2025-02-05 DIAGNOSIS — I10 ESSENTIAL HYPERTENSION: ICD-10-CM

## 2025-02-05 DIAGNOSIS — E78.00 HYPERCHOLESTEREMIA: ICD-10-CM

## 2025-02-05 DIAGNOSIS — N18.31 STAGE 3A CHRONIC KIDNEY DISEASE: ICD-10-CM

## 2025-02-05 PROCEDURE — 3075F SYST BP GE 130 - 139MM HG: CPT | Performed by: FAMILY MEDICINE

## 2025-02-05 PROCEDURE — 1159F MED LIST DOCD IN RCRD: CPT | Performed by: FAMILY MEDICINE

## 2025-02-05 PROCEDURE — 1126F AMNT PAIN NOTED NONE PRSNT: CPT | Performed by: FAMILY MEDICINE

## 2025-02-05 PROCEDURE — 99214 OFFICE O/P EST MOD 30 MIN: CPT | Performed by: FAMILY MEDICINE

## 2025-02-05 PROCEDURE — 1160F RVW MEDS BY RX/DR IN RCRD: CPT | Performed by: FAMILY MEDICINE

## 2025-02-05 PROCEDURE — G0439 PPPS, SUBSEQ VISIT: HCPCS | Performed by: FAMILY MEDICINE

## 2025-02-05 PROCEDURE — 3078F DIAST BP <80 MM HG: CPT | Performed by: FAMILY MEDICINE

## 2025-02-05 PROCEDURE — 1170F FXNL STATUS ASSESSED: CPT | Performed by: FAMILY MEDICINE

## 2025-02-05 RX ORDER — ZOLPIDEM TARTRATE 12.5 MG/1
12.5 TABLET, FILM COATED, EXTENDED RELEASE ORAL NIGHTLY PRN
Qty: 30 TABLET | Refills: 5 | Status: SHIPPED | OUTPATIENT
Start: 2025-02-05

## 2025-02-05 RX ORDER — CLOPIDOGREL BISULFATE 75 MG/1
75 TABLET ORAL DAILY
Qty: 30 TABLET | Refills: 5 | Status: SHIPPED | OUTPATIENT
Start: 2025-02-05

## 2025-02-05 RX ORDER — LISINOPRIL AND HYDROCHLOROTHIAZIDE 10; 12.5 MG/1; MG/1
1 TABLET ORAL DAILY
Qty: 90 TABLET | Refills: 1 | Status: SHIPPED | OUTPATIENT
Start: 2025-02-05

## 2025-02-05 RX ORDER — ATORVASTATIN CALCIUM 40 MG/1
40 TABLET, FILM COATED ORAL DAILY
Qty: 90 TABLET | Refills: 1 | Status: SHIPPED | OUTPATIENT
Start: 2025-02-05

## 2025-02-05 NOTE — PROGRESS NOTES
Subjective   The ABCs of the Annual Wellness Visit  Medicare Wellness Visit      Huang Almonte is a 64 y.o. patient who presents for a Medicare Wellness Visit.    The following portions of the patient's history were reviewed and   updated as appropriate: allergies, current medications, past family history, past medical history, past social history, past surgical history, and problem list.    Compared to one year ago, the patient's physical   health is worse.  He had a stroke last summer  Compared to one year ago, the patient's mental   health is worse.    Recent Hospitalizations:  He was admitted within the past 365 days at outside hospital.     Current Medical Providers:  Patient Care Team:  Jeremías Brar MD as PCP - General    Outpatient Medications Prior to Visit   Medication Sig Dispense Refill    aspirin 81 MG EC tablet Take 1 tablet by mouth Daily. 30 tablet 5    atorvastatin (LIPITOR) 40 MG tablet Take 1 tablet by mouth Daily. 90 tablet 1    clopidogrel (Plavix) 75 MG tablet Take 1 tablet by mouth Daily. 30 tablet 5    Ferrous Sulfate ER 50 MG tablet controlled-release Take 1 tablet by mouth Daily. 90 tablet 1    gabapentin (NEURONTIN) 400 MG capsule Gabapentin 400 MG Oral Capsule; Patient Sig: Gabapentin 400 MG Oral Capsule ; 90; 0; 07-May-2015; Active (Patient taking differently: Take 1 capsule by mouth 3 (Three) Times a Day.)      lidocaine (LIDODERM) 5 %       lisinopril-hydrochlorothiazide (Zestoretic) 10-12.5 MG per tablet Take 1 tablet by mouth Daily. 90 tablet 1    meclizine (ANTIVERT) 25 MG tablet 1/2 to 1 pill as needed every 4 hours for dizziness 40 tablet 1    multivitamin with minerals (MULTIVITAMIN ADULT PO) Take 1 tablet by mouth Daily.      Omega-3 Fatty Acids (Omega-3 Fish Oil) 500 MG capsule Take 1 capsule by mouth Daily.      oxyCODONE-acetaminophen (PERCOCET) 7.5-325 MG per tablet       zolpidem CR (AMBIEN CR) 12.5 MG CR tablet Take 1 tablet by mouth At Night As Needed for Sleep. 30  "tablet 5     No facility-administered medications prior to visit.     Opioid medication/s are on active medication list.  and I have evaluated his active treatment plan and pain score trends (see table).  There were no vitals filed for this visit.  I have reviewed the chart for potential of high risk medication and harmful drug interactions in the elderly.              Patient Active Problem List   Diagnosis    Back pain    Generalized anxiety disorder    Hypercholesteremia    Current every day smoker    Insomnia    Chronic ITP (idiopathic thrombocytopenia)    Hypogonadism in male    Essential hypertension    Stage 3a chronic kidney disease    Cerebrovascular accident (CVA) due to occlusion of left middle cerebral artery    Prostate cancer     Advance Care Planning Advance Directive is not on file.  ACP discussion was held with the patient during this visit. Patient does not have an advance directive, information provided.            Objective   Vitals:    25 1006   BP: 130/70   Pulse: 80   Resp: 16   Temp: 97.9 °F (36.6 °C)   SpO2: 98%   Weight: 73.5 kg (162 lb)   Height: 170.2 cm (67.01\")       Estimated body mass index is 25.37 kg/m² as calculated from the following:    Height as of this encounter: 170.2 cm (67.01\").    Weight as of this encounter: 73.5 kg (162 lb).                Does the patient have evidence of cognitive impairment? No                                                                                                Health  Risk Assessment    Smoking Status:  Social History     Tobacco Use   Smoking Status Former    Current packs/day: 0.00    Average packs/day: 1 pack/day for 40.0 years (40.0 ttl pk-yrs)    Types: Cigarettes    Start date: 1979    Quit date: 2019    Years since quittin.0    Passive exposure: Past   Smokeless Tobacco Never     Alcohol Consumption:  Social History     Substance and Sexual Activity   Alcohol Use Yes    Comment: trying to quit       Fall Risk " Screen  STEADI Fall Risk Assessment was completed, and patient is at HIGH risk for falls. Assessment completed on:2025    Depression Screening   The PHQ has not been completed during this encounter.     Health Habits and Functional and Cognitive Screenin/5/2024    12:00 PM   Functional & Cognitive Status   Do you have difficulty preparing food and eating? No   Do you have difficulty bathing yourself, getting dressed or grooming yourself? No   Do you have difficulty using the toilet? No   Do you have difficulty moving around from place to place? No   Do you have trouble with steps or getting out of a bed or a chair? No   Current Diet Well Balanced Diet   Dental Exam Up to date   Eye Exam Not up to date   Exercise (times per week) 5 times per week   Current Exercises Include Walking   Do you need help using the phone?  No   Are you deaf or do you have serious difficulty hearing?  No   Do you need help to go to places out of walking distance? No   Do you need help shopping? No   Do you need help preparing meals?  No   Do you need help with housework?  No   Do you need help with laundry? No   Do you need help taking your medications? No   Do you need help managing money? No   Do you ever drive or ride in a car without wearing a seat belt? No   Have you felt unusual stress, anger or loneliness in the last month? No   Who do you live with? Alone   If you need help, do you have trouble finding someone available to you? No   Have you been bothered in the last four weeks by sexual problems? No   Do you have difficulty concentrating, remembering or making decisions? No           Age-appropriate Screening Schedule:  Refer to the list below for future screening recommendations based on patient's age, sex and/or medical conditions. Orders for these recommended tests are listed in the plan section. The patient has been provided with a written plan.    Health Maintenance List  Health Maintenance   Topic Date Due     ZOSTER VACCINE (1 of 2) Never done    COVID-19 Vaccine (3 - 2024-25 season) 09/01/2024    AAA SCREEN ONCE  Never done    ANNUAL WELLNESS VISIT  02/05/2025    LIPID PANEL  08/05/2025    LUNG CANCER SCREENING  09/23/2025    BMI FOLLOWUP  10/08/2025    COLORECTAL CANCER SCREENING  06/22/2026    TDAP/TD VACCINES (2 - Td or Tdap) 10/16/2028    HEPATITIS C SCREENING  Completed    INFLUENZA VACCINE  Completed    Pneumococcal Vaccine 65+  Completed                                                                                                                                                CMS Preventative Services Quick Reference  Risk Factors Identified During Encounter  Fall Risk-High or Moderate: Discussed Fall Prevention in the home  Inactivity/Sedentary: Patient was advised to exercise at least 150 minutes a week per CDC recommendations.  Polypharmacy: Medication List reviewed    The above risks/problems have been discussed with the patient.  Pertinent information has been shared with the patient in the After Visit Summary.  An After Visit Summary and PPPS were made available to the patient.    Follow Up:   Next Medicare Wellness visit to be scheduled in 1 year.         Additional E&M Note during same encounter follows:  Patient has additional, significant, and separately identifiable condition(s)/problem(s) that require work above and beyond the Medicare Wellness Visit     Chief Complaint  Hypertension and Cerebrovascular accident (CVA) due to occlusion of left mid    Subjective   HPI  Huang is also being seen today for additional medical problem/s.    He continues to deal with RUE and RLE deficit form his CVA  Numbness and some weakness still  Hard to write, use the right hand for fine movements  He does decline PT still      Huang Almonte  is here for follow-up of hypertension of several years duration. He is not exercising and is not adherent to a low-salt diet. Patient does not check his blood pressure.   He is  "compliant with meds.        Huang Almonte returns today for follow up of Hyperlipidemia  Huang indicates his exercise level as not at all.  Diet: unchanged  Patient is compliant with medications   Any side effects to medications:   chest pain No myalgia No memory change No  Pt is due for labs          Review of Systems   Respiratory: Negative.     Cardiovascular: Negative.    Neurological:  Positive for weakness.   Psychiatric/Behavioral: Negative.                Objective   Vital Signs:  /70   Pulse 80   Temp 97.9 °F (36.6 °C)   Resp 16   Ht 170.2 cm (67.01\")   Wt 73.5 kg (162 lb)   SpO2 98%   BMI 25.37 kg/m²   Physical Exam  Vitals and nursing note reviewed.   Constitutional:       General: He is not in acute distress.     Appearance: Normal appearance. He is well-developed.   Cardiovascular:      Rate and Rhythm: Normal rate and regular rhythm.      Heart sounds: Normal heart sounds.   Pulmonary:      Effort: Pulmonary effort is normal.      Breath sounds: Normal breath sounds.   Musculoskeletal:      Comments: Normal appearing gait, minimal R leg slowness   Neurological:      Mental Status: He is alert and oriented to person, place, and time.   Psychiatric:         Mood and Affect: Mood normal.         Behavior: Behavior normal.         Thought Content: Thought content normal.         Judgment: Judgment normal.                   Assessment and Plan     Diagnoses and all orders for this visit:    1. Medicare annual wellness visit, subsequent (Primary)    2. Essential hypertension  -     CBC & Differential  -     Comprehensive Metabolic Panel  -     lisinopril-hydrochlorothiazide (Zestoretic) 10-12.5 MG per tablet; Take 1 tablet by mouth Daily.  Dispense: 90 tablet; Refill: 1    3. Hypercholesteremia  -     Comprehensive Metabolic Panel  -     Lipid Panel  -     atorvastatin (LIPITOR) 40 MG tablet; Take 1 tablet by mouth Daily.  Dispense: 90 tablet; Refill: 1    4. Stage 3a chronic kidney disease  -     " Comprehensive Metabolic Panel    5. Prostate cancer  -     PSA DIAGNOSTIC    6. Cerebrovascular accident (CVA) due to occlusion of left middle cerebral artery  -     clopidogrel (Plavix) 75 MG tablet; Take 1 tablet by mouth Daily.  Dispense: 30 tablet; Refill: 5    7. Current every day smoker    8. Iron deficiency anemia, unspecified iron deficiency anemia type  -     Ferrous Sulfate ER 50 MG tablet controlled-release; Take 1 tablet by mouth Daily.  Dispense: 90 tablet; Refill: 1    9. Other insomnia  -     zolpidem CR (AMBIEN CR) 12.5 MG CR tablet; Take 1 tablet by mouth At Night As Needed for Sleep.  Dispense: 30 tablet; Refill: 5      Medicare wellness completed.  Diet and exercise encouraged.  He has quit smoking but still vaping    Continue prinzide for BP< no changes  Continue lipitor and check lipids  I did recommend PT for ongoing issues with RUE and RLE from CVA but he declines, continue plavix  Ok ambien for sleep, still helping, YESY reviewed

## 2025-02-06 ENCOUNTER — NURSE TRIAGE (OUTPATIENT)
Dept: CALL CENTER | Facility: HOSPITAL | Age: 65
End: 2025-02-06
Payer: MEDICARE

## 2025-02-06 ENCOUNTER — TELEPHONE (OUTPATIENT)
Dept: FAMILY MEDICINE CLINIC | Facility: CLINIC | Age: 65
End: 2025-02-06
Payer: MEDICARE

## 2025-02-06 LAB
ALBUMIN SERPL-MCNC: 4.5 G/DL (ref 3.5–5.2)
ALBUMIN/GLOB SERPL: 1.7 G/DL
ALP SERPL-CCNC: 90 U/L (ref 39–117)
ALT SERPL-CCNC: 17 U/L (ref 1–41)
AST SERPL-CCNC: 17 U/L (ref 1–40)
BASOPHILS # BLD AUTO: 0.07 10*3/MM3 (ref 0–0.2)
BASOPHILS NFR BLD AUTO: 0.7 % (ref 0–1.5)
BILIRUB SERPL-MCNC: 0.5 MG/DL (ref 0–1.2)
BUN SERPL-MCNC: 27 MG/DL (ref 8–23)
BUN/CREAT SERPL: 16.9 (ref 7–25)
CALCIUM SERPL-MCNC: 9.6 MG/DL (ref 8.6–10.5)
CHLORIDE SERPL-SCNC: 97 MMOL/L (ref 98–107)
CHOLEST SERPL-MCNC: 175 MG/DL (ref 0–200)
CO2 SERPL-SCNC: 24.7 MMOL/L (ref 22–29)
CREAT SERPL-MCNC: 1.6 MG/DL (ref 0.76–1.27)
EGFRCR SERPLBLD CKD-EPI 2021: 47.5 ML/MIN/1.73
EOSINOPHIL # BLD AUTO: 0.06 10*3/MM3 (ref 0–0.4)
EOSINOPHIL NFR BLD AUTO: 0.6 % (ref 0.3–6.2)
ERYTHROCYTE [DISTWIDTH] IN BLOOD BY AUTOMATED COUNT: 12.5 % (ref 12.3–15.4)
GLOBULIN SER CALC-MCNC: 2.7 GM/DL
GLUCOSE SERPL-MCNC: 112 MG/DL (ref 65–99)
HCT VFR BLD AUTO: 41.4 % (ref 37.5–51)
HDLC SERPL-MCNC: 42 MG/DL (ref 40–60)
HGB BLD-MCNC: 13.7 G/DL (ref 13–17.7)
IMM GRANULOCYTES # BLD AUTO: 0.03 10*3/MM3 (ref 0–0.05)
IMM GRANULOCYTES NFR BLD AUTO: 0.3 % (ref 0–0.5)
LDLC SERPL CALC-MCNC: 97 MG/DL (ref 0–100)
LYMPHOCYTES # BLD AUTO: 1.72 10*3/MM3 (ref 0.7–3.1)
LYMPHOCYTES NFR BLD AUTO: 16.7 % (ref 19.6–45.3)
MCH RBC QN AUTO: 30 PG (ref 26.6–33)
MCHC RBC AUTO-ENTMCNC: 33.1 G/DL (ref 31.5–35.7)
MCV RBC AUTO: 90.8 FL (ref 79–97)
MONOCYTES # BLD AUTO: 0.96 10*3/MM3 (ref 0.1–0.9)
MONOCYTES NFR BLD AUTO: 9.3 % (ref 5–12)
NEUTROPHILS # BLD AUTO: 7.44 10*3/MM3 (ref 1.7–7)
NEUTROPHILS NFR BLD AUTO: 72.4 % (ref 42.7–76)
PLATELET # BLD AUTO: 38 10*3/MM3 (ref 140–450)
POTASSIUM SERPL-SCNC: 4.3 MMOL/L (ref 3.5–5.2)
PROT SERPL-MCNC: 7.2 G/DL (ref 6–8.5)
PSA SERPL-MCNC: 5.88 NG/ML (ref 0–4)
RBC # BLD AUTO: 4.56 10*6/MM3 (ref 4.14–5.8)
SODIUM SERPL-SCNC: 135 MMOL/L (ref 136–145)
TRIGL SERPL-MCNC: 211 MG/DL (ref 0–150)
VLDLC SERPL CALC-MCNC: 36 MG/DL (ref 5–40)
WBC # BLD AUTO: 10.28 10*3/MM3 (ref 3.4–10.8)

## 2025-02-06 NOTE — TELEPHONE ENCOUNTER
Caller: [facility] [phone] - [place]  [address] [facility] - [phone] [facility] [phone]     Relationship: Pharmacy    Best call back number: [phone]     Which medication are you concerned about: lisinopril-hydrochlorothiazide (Zestoretic) 10-12.5 MG per tablet     What are your concerns: PHARMACY IS CALLING AND STATES THE DOSE ON THIS MEDICATION HAS BEEN DECREASED AND THE PATIENT WAS UNAWARE OF THIS CHANGE. THEY WOULD LIKE TO CLARIFY WHAT'S THE CORRECT DOSE FOR THIS MEDICATION.

## 2025-02-06 NOTE — TELEPHONE ENCOUNTER
Plt 38 from blood collected 2/5/25 @ 1559    Per patient's chart:  12/6/24 plt 42; 8/5/24 plt 46; 7/3/24 plt 44; 6/7/24 plt 39; 2/5/24 plt 33    Sent secure chat message, will call, too

## 2025-02-06 NOTE — TELEPHONE ENCOUNTER
Called and verified with pt. He has a bottle currently at home that was filled on 12-23-24 by you that is Lisinopril/HCT 20/12.5mg  1 po qd. He is not sure what he should be taking.

## 2025-02-12 DIAGNOSIS — G47.09 OTHER INSOMNIA: ICD-10-CM

## 2025-02-12 RX ORDER — ZOLPIDEM TARTRATE 12.5 MG/1
12.5 TABLET, FILM COATED, EXTENDED RELEASE ORAL NIGHTLY PRN
Qty: 30 TABLET | Refills: 5 | Status: CANCELLED | OUTPATIENT
Start: 2025-02-12

## 2025-02-12 RX ORDER — ZOLPIDEM TARTRATE 10 MG/1
10 TABLET ORAL NIGHTLY PRN
Qty: 30 TABLET | Refills: 5 | Status: SHIPPED | OUTPATIENT
Start: 2025-02-12 | End: 2025-02-16 | Stop reason: SDUPTHER

## 2025-02-12 NOTE — TELEPHONE ENCOUNTER
Caller: Porfirio Almonte    Relationship: Self    Best call back number:      Requested Prescriptions:   Requested Prescriptions     Pending Prescriptions Disp Refills    zolpidem CR (AMBIEN CR) 12.5 MG CR tablet 30 tablet 5     Sig: Take 1 tablet by mouth At Night As Needed for Sleep.        Pharmacy where request should be sent: [facility] [id number] [address] [phone] [facility] [phone] [facility]     Last office visit with prescribing clinician: 2/5/2025   Last telemedicine visit with prescribing clinician: Visit date not found   Next office visit with prescribing clinician: 8/5/2025     Additional details provided by patient: PORFIRIO HAS 2 PILLS LEFT.  HE IS ASKING FOR 90 DAY SUPPLY.      PORFIRIO ALSO SAID HE IS CHANGING INSURANCE [id number], HE WAS TOLD THEY DO NOT COVER THE CR VERSION.      Does the patient have less than a 3 day supply:  [x] Yes  [] No    Missy Guillermo Rep   02/12/25 08:29 EST

## 2025-02-13 ENCOUNTER — TELEPHONE (OUTPATIENT)
Dept: CARDIOLOGY | Facility: HOSPITAL | Age: 65
End: 2025-02-13
Payer: MEDICARE

## 2025-02-13 NOTE — TELEPHONE ENCOUNTER
----- Message from Liss Rainey sent at 2/12/2025  1:17 PM EST -----  Call patient:    Patient's heart monitor results have been reviewed.  No concerning heart arrhythmias were identified.

## 2025-02-14 NOTE — TELEPHONE ENCOUNTER
Caller: Huang Almonte    Relationship: Self    Best call back number: [phone]     What was the call regarding: PATIENT IS CALLING BACK AND STATES HE RECEIVED A REFILL ON THIS MEDICATION BUT IT WAS ONLY A MONTH SUPPLY. HE WOULD LIKE TO KNOW IF HE CAN GET A 3 MONTH SUPPLY INSTEAD.

## 2025-02-14 NOTE — TELEPHONE ENCOUNTER
Spoke with patient> stated insurance changes next month and new may not cover this. Would like to know 90 supply could be sent in this time so he can get it through the insurance now. Has not  yet

## 2025-02-16 DIAGNOSIS — G47.09 OTHER INSOMNIA: ICD-10-CM

## 2025-02-16 RX ORDER — ZOLPIDEM TARTRATE 10 MG/1
10 TABLET ORAL NIGHTLY PRN
Qty: 90 TABLET | Refills: 0 | Status: SHIPPED | OUTPATIENT
Start: 2025-02-16

## 2025-02-18 ENCOUNTER — TELEPHONE (OUTPATIENT)
Dept: NEUROLOGY | Facility: CLINIC | Age: 65
End: 2025-02-18

## 2025-02-18 NOTE — TELEPHONE ENCOUNTER
PATIENT SAYS HE KNOWS NOTHING ABOUT OPEN ORDERS FOR LAB AND HOLTER MONITOR.    HE RECEIVED A LETTER IN THE MAIL TO COMPLETE THESE ITEMS    HE WOULD LIKE TO SPEAK WITH SOMEONE ABOUT WHAT THEY ARE AND WHY HE NEEDS THEM    PLEASE ADVISE PATIENT

## 2025-02-19 ENCOUNTER — OFFICE VISIT (OUTPATIENT)
Dept: CARDIOLOGY | Facility: HOSPITAL | Age: 65
End: 2025-02-19
Payer: MEDICARE

## 2025-02-19 VITALS
DIASTOLIC BLOOD PRESSURE: 78 MMHG | WEIGHT: 169.8 LBS | BODY MASS INDEX: 26.65 KG/M2 | SYSTOLIC BLOOD PRESSURE: 126 MMHG | OXYGEN SATURATION: 96 % | RESPIRATION RATE: 18 BRPM | HEART RATE: 88 BPM | HEIGHT: 67 IN

## 2025-02-19 DIAGNOSIS — I10 PRIMARY HYPERTENSION: ICD-10-CM

## 2025-02-19 DIAGNOSIS — R00.2 PALPITATIONS: Primary | ICD-10-CM

## 2025-02-19 DIAGNOSIS — I77.9 BILATERAL CAROTID ARTERY DISEASE, UNSPECIFIED TYPE: ICD-10-CM

## 2025-02-19 DIAGNOSIS — R73.09 ELEVATED GLUCOSE: ICD-10-CM

## 2025-02-19 DIAGNOSIS — Z86.73 HISTORY OF CVA (CEREBROVASCULAR ACCIDENT): ICD-10-CM

## 2025-02-19 LAB — HBA1C MFR BLD: 5.7 % (ref 4.8–5.6)

## 2025-02-19 PROCEDURE — 83036 HEMOGLOBIN GLYCOSYLATED A1C: CPT | Performed by: NURSE PRACTITIONER

## 2025-02-19 NOTE — TELEPHONE ENCOUNTER
Spoke with Huang and told him the reason Afua was wanting orders done were for secondary stroke prevention.  He stated that he already had holter monitor done with cardiology and results have been given to him.  He sees cardiology today with [facility] and will try and get lab for A1C done today.

## 2025-02-19 NOTE — PROGRESS NOTES
"[facility], [facility]    Chief Complaint  Palpitations (MONITOR RESULTS)    Subjective    History of Present Illness {CC  Problem List  Visit  Diagnosis   Encounters  Notes  Medications  Labs  Result Review Imaging  Media :23}     Huang Almonte presents to [facility] for   History of Present Illness     64-year-old male with a history of anxiety, hypercholesterolemia, insomnia/sleep disturbance, hypertension, stage III chronic kidney disease, prostate CA, history of CVA, carotid artery disease, chronic idiopathic thrombocytopenia.     CVA identified on MRI of the brain, 9/10/2024:     Carotid duplex 10/30/2024: LICA occluded, R ICA less than 50% stenosis.  CTA of the head and neck showed occlusion of left ICA appearing chronic with evidence of ECA to ICA collateralization and reconstruction of the supraclinoid segment.     Probable etiology for CVA related to carotid disease.  Patient has been evaluated by vascular surgery who did not feel interventions warranted.     Echocardiogram 10/30/2024: EF 51 to 55%, grade 1 diastolic dysfunction, saline test negative, LA showed normal left atrial size and volume.    Patient with history of sleep disturbance with referral to sleep medicine.    No CP or pressure.  Mild dizziness with change in positions, occasions.  No near syncope, syncope, edema. Continued numbness of the right side. NO palpitations.         Objective     Vital Signs:   Vitals:    02/19/25 1422 02/19/25 1446   BP: 141/73 126/78   BP Location: Left arm    Patient Position: Sitting    Cuff Size: Adult    Pulse: 105 88   Resp: 18    SpO2: 96%    Weight: 77 kg (169 lb 12.8 oz)    Height: 170.2 cm (67\")      Body mass index is 26.59 kg/m².  Physical Exam  Vitals reviewed.   Constitutional:       General: He is not in acute distress.  Cardiovascular:      Rate and Rhythm: Normal rate and regular rhythm.      Heart sounds: No " murmur heard.  Pulmonary:      Effort: Pulmonary effort is normal.      Breath sounds: Normal breath sounds.   Musculoskeletal:      Right lower leg: No edema.      Left lower leg: No edema.   Skin:     Coloration: Skin is not pale.   Neurological:      Mental Status: He is alert.   Psychiatric:         Mood and Affect: Mood normal.         Behavior: Behavior normal. Behavior is cooperative.              Result Review  Data Reviewed:{ Labs  Result Review  Imaging  Med Tab  Media :23}   Echocardiogram 10/30/2024: EF 51 to 55%, grade 1 diastolic dysfunction, saline test negative     Carotid duplex 10/30/2024: LICA occluded, R ICA less than 50%    Mobile cardiac telemetry 30-day monitor 1/9/2025: Average heart rate 83 bpm.  Controlled rate 86%.  PACs and PVCs less than 1%.  No significant arrhythmias.              Assessment and Plan {CC Problem List  Visit Diagnosis  ROS  Review (Popup)  Health Maintenance  Quality  BestPractice  Medications  SmartSets  SnapShot Encounters  Media :23}   1. Palpitations  No recent palpitations    Normal 30 day monitor.  NO Afib noted    2. Primary hypertension  Repeat BP improved and at goal    3. Bilateral carotid artery disease, unspecified type  Plan to f/u with vascular SX and neurology    - Hemoglobin A1c    4. History of CVA (cerebrovascular accident)  Continue asa, statin, plavix    Per neurology note. Etiology felt to be related to carotid disease     Negative bubble study on echo  No afib on 30 day    With a etiology probable carotid artery disease will not pursue loop recorder unless neurology feels indicated.      - Hemoglobin A1c    5. Elevated glucose    - Hemoglobin A1c    Patient will follow-up with neurology and primary care provider as ordered.  Follow-up in the heart valve center as needed.      Follow Up {Instructions Charge Capture  Follow-up Communications :23}   Return if symptoms worsen or fail to improve.    Patient was given instructions and  counseling regarding his condition or for health maintenance advice. Please see specific information pulled into the AVS if appropriate.  Patient was instructed to call the [facility] Center with any questions, concerns, or worsening symptoms.

## 2025-03-13 NOTE — PROGRESS NOTES
Chief Complaint  Sleeping Problem, Snoring, Frequent Awakenings, Non-restorative Sleep, Unable To Fall Asleep, and Unable To Stay Asleep    Subjective     History of Present Illness:  Huang Almonte is a 64 y.o. male with CVA, thrombocytopenia, CKD stage IIIa, hypercholesterolemia, hypertension, and cataracts.  The patient is referred by ANGELIKA Obregon with neurology.  Patient has had difficulty with snoring.  Review of self-reported questionnaire notes symptoms including frequent urination.  Patient reports symptoms have been ongoing for more than 5 years.  He typically goes to bed 11:30 PM waking at 7 AM on weekdays and weekends.  He estimates an average of 4-5 hours of sleep per night and it takes 5 to 10 minutes for him to get to sleep.  Patient takes 30-minute naps.  Patient uses e-cigarettes, he drinks alcohol monthly or less, and has used recreational substances.  He drinks 2 cups regular coffee, 2 cups regular tea, and 1 regular cola daily    Further details are as follows:    Cordova Scale is (out of 24): Total score: 2     Estimated average amount of sleep per night: 4 to 5 hours  Number of times he wakes up at night: 2 times  Difficulty falling back asleep: not usually  It usually takes 5-10 minutes to go to sleep.  He feels sleepy upon waking up: not usually  Rotating or night shift work: no    Drowsiness/Sleepiness:  He exhibits the following:  excessive daytime sleepiness  excessive daytime fatigue  falls asleep watching TV  Occasional naps    Snoring/Breathing:  He exhibits the following:  awakens gasping for breath and catches himself snoring and gasping    Head Injury:  He exhibits the following:  No    Reflux:  He describes the following:  Not waking with     Narcolepsy:  He exhibits the following:  none    RLS/PLMs:  He describes the following:  Tingling in legs    Insomnia:  He describes the following:  problems initiating sleep at night- if not using ambien    Parasomnia:  He exhibits the  following:  sleep talks    Weight:       03/18/25  1008   Weight: 75.4 kg (166 lb 4.8 oz)      Weight change in the last year:  gain: 0 lbs    The patient's relevant past medical, surgical, family, and social history reviewed and updated in Epic as appropriate.    Review of Systems   Constitutional: Negative.    HENT: Negative.  Positive for congestion, dental problem and sinus pressure.    Eyes: Negative.  Positive for itching.   Respiratory: Negative.     Cardiovascular: Negative.    Gastrointestinal: Negative.    Endocrine: Negative.    Genitourinary: Negative.    Musculoskeletal: Negative.  Positive for back pain.   Skin: Negative.    Allergic/Immunologic: Negative.  Positive for environmental allergies.   Neurological: Negative.  Positive for dizziness and numbness.   Hematological: Negative.    Psychiatric/Behavioral: Negative.  The patient is nervous/anxious.    All other systems reviewed and are negative.      PMH:    Past Medical History:   Diagnosis Date    Cataract     Cerebrovascular accident (CVA) due to occlusion of left middle cerebral artery 10/08/2024    Some RUE dexterity issues      Dyspepsia     Essential hypertension 07/14/2020    Hemorrhoids     Hypercholesteremia     Prostate cancer 10/08/2024    Stage 3a chronic kidney disease 08/06/2024    Thrombocytopenia     TIA (transient ischemic attack) 10/14/2024     Past Surgical History:   Procedure Laterality Date    NO PAST SURGERIES         No Known Allergies    MEDS:  Prior to Admission medications    Medication Sig Start Date End Date Taking? Authorizing Provider   aspirin 81 MG EC tablet Take 1 tablet by mouth Daily. 9/10/24   Jeremías Brar MD   atorvastatin (LIPITOR) 40 MG tablet Take 1 tablet by mouth Daily. 2/5/25   Jeremías Brar MD   clopidogrel (Plavix) 75 MG tablet Take 1 tablet by mouth Daily. 2/5/25   Jeremías Brar MD   Ferrous Sulfate ER 50 MG tablet controlled-release Take 1 tablet by mouth Daily. 2/5/25   Jeremías Brar MD  "  gabapentin (NEURONTIN) 400 MG capsule Gabapentin 400 MG Oral Capsule; Patient Sig: Gabapentin 400 MG Oral Capsule ; 90; 0; 07-May-2015; Active  Patient taking differently: Take 600 mg by mouth 3 (Three) Times a Day. 5/7/15   Fantasma Wolfe MD   lidocaine (LIDODERM) 5 %  7/23/24   Greta Gil MD   lisinopril-hydrochlorothiazide (Zestoretic) 10-12.5 MG per tablet Take 1 tablet by mouth Daily. 2/5/25   Jeremías Brar MD   meclizine (ANTIVERT) 25 MG tablet 1/2 to 1 pill as needed every 4 hours for dizziness  Patient not taking: Reported on 2/19/2025 8/5/24   Jeremías Brar MD   multivitamin with minerals (MULTIVITAMIN ADULT PO) Take 1 tablet by mouth Daily.    Greta Gil MD   Omega-3 Fatty Acids (Omega-3 Fish Oil) 500 MG capsule Take 1 capsule by mouth Daily.    Greta Gil MD   oxyCODONE-acetaminophen (PERCOCET) 7.5-325 MG per tablet  3/5/21   Greta Gil MD   zolpidem (AMBIEN) 10 MG tablet Take 1 tablet by mouth At Night As Needed for Sleep. 2/16/25   Jeremías Brar MD       FH:  Family History   Problem Relation Age of Onset    Hypertension Mother     Heart attack Father        Objective   Vital Signs:  /70 (BP Location: Right arm, Patient Position: Sitting, Cuff Size: Adult)   Pulse 87   Temp 97.1 °F (36.2 °C) (Temporal)   Ht 170.2 cm (67.01\")   Wt 75.4 kg (166 lb 4.8 oz)   SpO2 98%   BMI 26.04 kg/m²     Patient's (Body mass index is 26.04 kg/m².) indicates that they are overweight (BMI 25-29.9) with health related conditions that include obstructive sleep apnea, hypertension, coronary heart disease, and diabetes mellitus . Weight is unchanged. BMI is is above average; BMI management plan is completed. We discussed portion control and increasing exercise.            Physical Exam  Vitals reviewed.   Constitutional:       Appearance: Normal appearance.   HENT:      Head: Normocephalic and atraumatic.      Nose: Nose normal.      Mouth/Throat:      Mouth: " Mucous membranes are moist.   Cardiovascular:      Rate and Rhythm: Normal rate and regular rhythm.      Heart sounds: No murmur heard.     No friction rub. No gallop.   Pulmonary:      Effort: Pulmonary effort is normal. No respiratory distress.      Breath sounds: Normal breath sounds. No wheezing or rhonchi.   Neurological:      Mental Status: He is alert and oriented to person, place, and time.   Psychiatric:         Behavior: Behavior normal.       Mallampati Score: IV (only hard palate visible)    Result Review :              Assessment and Plan  Huang Almonte is a 64 y.o. male with CVA, thrombocytopenia, CKD stage IIIa, hypercholesterolemia, hypertension, and cataracts who presents for further evaluation of excessive daytime sleepiness and fatigue, nonrestorative sleep, and concerns for sleep disordered breathing and obstructive sleep apnea. The patient's symptoms, particularly snoring, are concerning for significant sleep disordered breathing and obstructive sleep apnea. We will obtain a home sleep test for further evaluation.  The patient will return for follow-up and recommendations after test.  I have discussed weight loss as it pertains to obstructive sleep apnea.    Diagnoses and all orders for this visit:    1. Sleep apnea, unspecified type (Primary)  -     Home Sleep Study; Future    2. Snoring  -     Home Sleep Study; Future    3. Excessive daytime sleepiness  -     Home Sleep Study; Future    4. Overweight with body mass index (BMI) 25.0-29.9                 I discussed the consequences of uncontrolled sleep apnea including hypertension, heart disease, diabetes, stroke, and dementia. I further discussed sleep apnea therapeutic options including CPAP, Weight loss, Oral dental appliance, and surgery.         Follow Up  Return for Follow up after study.  Patient was given instructions and counseling regarding his condition or for health maintenance advice. Please see specific information pulled into the  AVS if appropriate.     ANGELIKA Anne, ACNP-BC  Pulmonology, Critical Care, and Sleep Medicine

## 2025-03-18 ENCOUNTER — OFFICE VISIT (OUTPATIENT)
Dept: SLEEP MEDICINE | Age: 65
End: 2025-03-18
Payer: MEDICARE

## 2025-03-18 VITALS
HEART RATE: 87 BPM | OXYGEN SATURATION: 98 % | HEIGHT: 67 IN | BODY MASS INDEX: 26.1 KG/M2 | TEMPERATURE: 97.1 F | SYSTOLIC BLOOD PRESSURE: 139 MMHG | WEIGHT: 166.3 LBS | DIASTOLIC BLOOD PRESSURE: 70 MMHG

## 2025-03-18 DIAGNOSIS — G47.30 SLEEP APNEA, UNSPECIFIED TYPE: Primary | ICD-10-CM

## 2025-03-18 DIAGNOSIS — G47.19 EXCESSIVE DAYTIME SLEEPINESS: ICD-10-CM

## 2025-03-18 DIAGNOSIS — R06.83 SNORING: ICD-10-CM

## 2025-03-18 DIAGNOSIS — E66.3 OVERWEIGHT WITH BODY MASS INDEX (BMI) 25.0-29.9: ICD-10-CM

## 2025-05-21 ENCOUNTER — HOSPITAL ENCOUNTER (OUTPATIENT)
Dept: SLEEP MEDICINE | Facility: HOSPITAL | Age: 65
Discharge: HOME | End: 2025-05-21
Admitting: NURSE PRACTITIONER
Payer: MEDICARE

## 2025-05-21 ENCOUNTER — HOSPITAL ENCOUNTER (OUTPATIENT)
Dept: GENERAL RADIOLOGY | Facility: HOSPITAL | Age: 65
Discharge: HOME | End: 2025-05-21
Payer: MEDICARE

## 2025-05-21 ENCOUNTER — TRANSCRIBE ORDERS (OUTPATIENT)
Dept: GENERAL RADIOLOGY | Facility: HOSPITAL | Age: 65
End: 2025-05-21
Payer: MEDICARE

## 2025-05-21 VITALS — HEIGHT: 67 IN | WEIGHT: 166 LBS | BODY MASS INDEX: 26.06 KG/M2

## 2025-05-21 DIAGNOSIS — G47.30 SLEEP APNEA, UNSPECIFIED TYPE: ICD-10-CM

## 2025-05-21 DIAGNOSIS — M51.369 NARROWING OF LUMBAR INTERVERTEBRAL DISC SPACE: Primary | ICD-10-CM

## 2025-05-21 DIAGNOSIS — R06.83 SNORING: ICD-10-CM

## 2025-05-21 DIAGNOSIS — G47.19 EXCESSIVE DAYTIME SLEEPINESS: ICD-10-CM

## 2025-05-21 DIAGNOSIS — M51.369 NARROWING OF LUMBAR INTERVERTEBRAL DISC SPACE: ICD-10-CM

## 2025-05-21 PROCEDURE — G0399 HOME SLEEP TEST/TYPE 3 PORTA: HCPCS

## 2025-05-21 PROCEDURE — 72100 X-RAY EXAM L-S SPINE 2/3 VWS: CPT

## 2025-05-22 ENCOUNTER — TRANSCRIBE ORDERS (OUTPATIENT)
Dept: ADMINISTRATIVE | Facility: HOSPITAL | Age: 65
End: 2025-05-22
Payer: MEDICARE

## 2025-05-22 DIAGNOSIS — M51.369 OTHER INTERVERTEBRAL DISC DEGENERATION OF LUMBAR REGION WITHOUT LUMBAR BACK PAIN OR LOWER EXTREMITY PAIN: Primary | ICD-10-CM

## 2025-05-30 ENCOUNTER — TELEPHONE (OUTPATIENT)
Dept: SLEEP MEDICINE | Age: 65
End: 2025-05-30
Payer: MEDICARE

## 2025-06-04 ENCOUNTER — TELEPHONE (OUTPATIENT)
Dept: SLEEP MEDICINE | Age: 65
End: 2025-06-04
Payer: MEDICARE

## 2025-06-04 NOTE — TELEPHONE ENCOUNTER
Patient called back reference his sleep study results, patient would like to discuss with provider. Has follow up appointment scheduled.

## 2025-06-04 NOTE — TELEPHONE ENCOUNTER
Left 3rd voice message for patient to give us a call back at the office in reference to their sleep study results.

## 2025-06-05 ENCOUNTER — OFFICE VISIT (OUTPATIENT)
Dept: SLEEP MEDICINE | Age: 65
End: 2025-06-05
Payer: MEDICARE

## 2025-06-05 VITALS
BODY MASS INDEX: 25.71 KG/M2 | TEMPERATURE: 97.5 F | WEIGHT: 163.8 LBS | HEART RATE: 95 BPM | HEIGHT: 67 IN | DIASTOLIC BLOOD PRESSURE: 70 MMHG | SYSTOLIC BLOOD PRESSURE: 140 MMHG | OXYGEN SATURATION: 98 %

## 2025-06-05 DIAGNOSIS — G47.33 OBSTRUCTIVE SLEEP APNEA, ADULT: Primary | ICD-10-CM

## 2025-06-05 DIAGNOSIS — E66.3 OVERWEIGHT WITH BODY MASS INDEX (BMI) 25.0-29.9: ICD-10-CM

## 2025-06-05 NOTE — PROGRESS NOTES
Sleep Clinic Follow Up Note    Chief Complaint  Sleeping Problem and Sleep Apnea (Follow up)    Subjective     History of Present Illness (from previous encounter on 3/18/2025):  Huang Almonte is a 64 y.o. male with CVA, thrombocytopenia, CKD stage IIIa, hypercholesterolemia, hypertension, and cataracts.  The patient is referred by ANGELIKA Obregon with neurology.  Patient has had difficulty with snoring.  Review of self-reported questionnaire notes symptoms including frequent urination.  Patient reports symptoms have been ongoing for more than 5 years.  He typically goes to bed 11:30 PM waking at 7 AM on weekdays and weekends.  He estimates an average of 4-5 hours of sleep per night and it takes 5 to 10 minutes for him to get to sleep.  Patient takes 30-minute naps.  Patient uses e-cigarettes, he drinks alcohol monthly or less, and has used recreational substances.  He drinks 2 cups regular coffee, 2 cups regular tea, and 1 regular cola daily. (End Copied Text)    -A home sleep test was obtained on 5/24/2025 revealing severe obstructive sleep apnea with an AHI of 44.9/H.     Interval History:  Huang Almonte is a 64 y.o. male who presents for follow-up after home sleep test.  Patient was found with severe obstructive sleep apnea as discussed above.     Further details are as follows:    Severn Scale is: 7/24      Weight: 163 lb      The patient's relevant past medical, surgical, family, and social history reviewed and updated in Epic as appropriate.    PMH:    Past Medical History:   Diagnosis Date    Cataract     Cerebrovascular accident (CVA) due to occlusion of left middle cerebral artery 10/08/2024    Some RUE dexterity issues      Dyspepsia     Essential hypertension 07/14/2020    Hemorrhoids     Hypercholesteremia     Prostate cancer 10/08/2024    Stage 3a chronic kidney disease 08/06/2024    Thrombocytopenia     TIA (transient ischemic attack) 10/14/2024     Past Surgical History:   Procedure Laterality  "Date    NO PAST SURGERIES         No Known Allergies    MEDS:  Prior to Admission medications    Medication Sig Start Date End Date Taking? Authorizing Provider   aspirin 81 MG EC tablet Take 1 tablet by mouth Daily. 9/10/24   Jeremías Brar MD   atorvastatin (LIPITOR) 40 MG tablet Take 1 tablet by mouth Daily. 2/5/25   Jeremías Brar MD   clopidogrel (Plavix) 75 MG tablet Take 1 tablet by mouth Daily. 2/5/25   Jeremías Brar MD   Ferrous Sulfate ER 50 MG tablet controlled-release Take 1 tablet by mouth Daily. 2/5/25   Jeremías Brar MD   gabapentin (NEURONTIN) 400 MG capsule Gabapentin 400 MG Oral Capsule; Patient Sig: Gabapentin 400 MG Oral Capsule ; 90; 0; 07-May-2015; Active  Patient taking differently: Take 600 mg by mouth 3 (Three) Times a Day. 5/7/15   Fantasma Wolfe MD   lidocaine (LIDODERM) 5 %  7/23/24   Greta Gil MD   lisinopril-hydrochlorothiazide (Zestoretic) 10-12.5 MG per tablet Take 1 tablet by mouth Daily. 2/5/25   Jeremías Brar MD   meclizine (ANTIVERT) 25 MG tablet 1/2 to 1 pill as needed every 4 hours for dizziness  Patient not taking: Reported on 3/18/2025 8/5/24   Jeremías Brar MD   multivitamin with minerals (MULTIVITAMIN ADULT PO) Take 1 tablet by mouth Daily.    Greta Gil MD   Omega-3 Fatty Acids (Omega-3 Fish Oil) 500 MG capsule Take 1 capsule by mouth Daily.    Greta Gil MD   oxyCODONE-acetaminophen (PERCOCET) 7.5-325 MG per tablet  3/5/21   Greta Gil MD   zolpidem (AMBIEN) 10 MG tablet Take 1 tablet by mouth At Night As Needed for Sleep. 2/16/25   Jeremías Brar MD         FH:  Family History   Problem Relation Age of Onset    Hypertension Mother     Heart attack Father        Objective   Vital Signs:  /70 (BP Location: Left arm, Patient Position: Sitting, Cuff Size: Adult)   Pulse 95   Temp 97.5 °F (36.4 °C) (Temporal)   Ht 170.2 cm (67.01\")   Wt 74.3 kg (163 lb 12.8 oz)   SpO2 98%   BMI 25.65 kg/m²     Patient's (Body " mass index is 25.65 kg/m².) indicates that they are overweight (BMI 25-29.9)         Physical Exam  Vitals reviewed.   Constitutional:       Appearance: Normal appearance.   HENT:      Head: Normocephalic and atraumatic.      Nose: Nose normal.      Mouth/Throat:      Mouth: Mucous membranes are moist.   Cardiovascular:      Rate and Rhythm: Normal rate and regular rhythm.      Heart sounds: No murmur heard.     No friction rub. No gallop.   Pulmonary:      Effort: Pulmonary effort is normal. No respiratory distress.      Breath sounds: Normal breath sounds. No wheezing or rhonchi.   Neurological:      Mental Status: He is alert and oriented to person, place, and time.   Psychiatric:         Behavior: Behavior normal.           Result Review :              Assessment and Plan  Huang Almonte is a 64 y.o. male returns for follow-up after home sleep test. The patient was found with severe obstructive sleep apnea with an AHI of 44.9/H.  I have discussed the consequences of uncontrolled sleep apnea.  I discussed treatment options and recommended PAP therapy.  I have discussed alternatives including MAD and surgical consult. I will place orders for new device and supplies and asked the patient to return for follow-up in compliance in 31-90 days.  I have noted the patient will need to use the device more than 4 hours a night, more than 70% of the time in order to remain fully compliant.     Diagnoses and all orders for this visit:    1. Obstructive sleep apnea, adult (Primary)    2. Overweight with body mass index (BMI) 25.0-29.9                  Follow Up  Return for 31 to 90 days after PAP setup.  Patient was given instructions and counseling regarding his condition or for health maintenance advice. Please see specific information pulled into the AVS if appropriate.       ANGELIKA Anne, ACNP-BC  Pulmonology, Critical Care, and Sleep Medicine     Additional handoff

## 2025-06-11 DIAGNOSIS — G47.09 OTHER INSOMNIA: ICD-10-CM

## 2025-06-11 NOTE — TELEPHONE ENCOUNTER
Caller: Huang Almonte    Relationship: Self    Best call back number: [phone]     Requested Prescriptions:   Requested Prescriptions     Pending Prescriptions Disp Refills    zolpidem (AMBIEN) 10 MG tablet 90 tablet 0     Sig: Take 1 tablet by mouth At Night As Needed for Sleep.        Pharmacy where request should be sent: [facility] [id number] [address] [phone] [facility] [phone] FX     Last office visit with prescribing clinician: 2/5/2025   Last telemedicine visit with prescribing clinician: Visit date not found   Next office visit with prescribing clinician: 8/5/2025     Additional details provided by patient: PATIENT HAS CALLED REQUESTING A REFILL ON ABOVE MEDICATION.     Does the patient have less than a 3 day supply:  [x] Yes  [] No    Would you like a call back once the refill request has been completed: [] Yes [x] No    If the office needs to give you a call back, can they leave a voicemail: [] Yes [x] No    Justen Benito   06/11/25 09:37 EDT

## 2025-06-13 ENCOUNTER — TELEPHONE (OUTPATIENT)
Dept: FAMILY MEDICINE CLINIC | Facility: CLINIC | Age: 65
End: 2025-06-13
Payer: MEDICARE

## 2025-06-13 ENCOUNTER — OFFICE VISIT (OUTPATIENT)
Dept: NEUROLOGY | Facility: CLINIC | Age: 65
End: 2025-06-13
Payer: MEDICARE

## 2025-06-13 ENCOUNTER — LAB (OUTPATIENT)
Dept: LAB | Facility: HOSPITAL | Age: 65
End: 2025-06-13
Payer: MEDICARE

## 2025-06-13 ENCOUNTER — OFFICE VISIT (OUTPATIENT)
Dept: ONCOLOGY | Facility: CLINIC | Age: 65
End: 2025-06-13
Payer: MEDICARE

## 2025-06-13 VITALS
DIASTOLIC BLOOD PRESSURE: 72 MMHG | WEIGHT: 163 LBS | BODY MASS INDEX: 25.58 KG/M2 | HEART RATE: 93 BPM | HEIGHT: 67 IN | SYSTOLIC BLOOD PRESSURE: 118 MMHG | OXYGEN SATURATION: 97 % | TEMPERATURE: 99.6 F

## 2025-06-13 VITALS
OXYGEN SATURATION: 98 % | TEMPERATURE: 97.1 F | RESPIRATION RATE: 16 BRPM | SYSTOLIC BLOOD PRESSURE: 136 MMHG | WEIGHT: 163.7 LBS | HEIGHT: 67 IN | BODY MASS INDEX: 25.69 KG/M2 | DIASTOLIC BLOOD PRESSURE: 87 MMHG | HEART RATE: 78 BPM

## 2025-06-13 DIAGNOSIS — C61 PROSTATE CANCER: ICD-10-CM

## 2025-06-13 DIAGNOSIS — G47.09 OTHER INSOMNIA: ICD-10-CM

## 2025-06-13 DIAGNOSIS — D69.3 CHRONIC ITP (IDIOPATHIC THROMBOCYTOPENIA): Primary | ICD-10-CM

## 2025-06-13 DIAGNOSIS — I69.30 HISTORY OF CVA WITH RESIDUAL DEFICIT: Primary | ICD-10-CM

## 2025-06-13 DIAGNOSIS — D69.3 CHRONIC ITP (IDIOPATHIC THROMBOCYTOPENIA): ICD-10-CM

## 2025-06-13 LAB
ALBUMIN SERPL-MCNC: 4.3 G/DL (ref 3.5–5.2)
ALBUMIN/GLOB SERPL: 1.9 G/DL
ALP SERPL-CCNC: 81 U/L (ref 39–117)
ALT SERPL W P-5'-P-CCNC: 25 U/L (ref 1–41)
ANION GAP SERPL CALCULATED.3IONS-SCNC: 11 MMOL/L (ref 5–15)
AST SERPL-CCNC: 24 U/L (ref 1–40)
BASOPHILS # BLD AUTO: 0.04 10*3/MM3 (ref 0–0.2)
BASOPHILS NFR BLD AUTO: 0.6 % (ref 0–1.5)
BILIRUB SERPL-MCNC: 0.4 MG/DL (ref 0–1.2)
BUN SERPL-MCNC: 19.8 MG/DL (ref 8–23)
BUN/CREAT SERPL: 14.7 (ref 7–25)
CALCIUM SPEC-SCNC: 9.3 MG/DL (ref 8.6–10.5)
CHLORIDE SERPL-SCNC: 101 MMOL/L (ref 98–107)
CO2 SERPL-SCNC: 27 MMOL/L (ref 22–29)
CREAT SERPL-MCNC: 1.35 MG/DL (ref 0.76–1.27)
DEPRECATED RDW RBC AUTO: 42.3 FL (ref 37–54)
EGFRCR SERPLBLD CKD-EPI 2021: 58.3 ML/MIN/1.73
EOSINOPHIL # BLD AUTO: 0.12 10*3/MM3 (ref 0–0.4)
EOSINOPHIL NFR BLD AUTO: 1.9 % (ref 0.3–6.2)
ERYTHROCYTE [DISTWIDTH] IN BLOOD BY AUTOMATED COUNT: 12.8 % (ref 12.3–15.4)
GLOBULIN UR ELPH-MCNC: 2.3 GM/DL
GLUCOSE SERPL-MCNC: 91 MG/DL (ref 65–99)
HCT VFR BLD AUTO: 37.5 % (ref 37.5–51)
HGB BLD-MCNC: 12.7 G/DL (ref 13–17.7)
IMM GRANULOCYTES # BLD AUTO: 0.01 10*3/MM3 (ref 0–0.05)
IMM GRANULOCYTES NFR BLD AUTO: 0.2 % (ref 0–0.5)
LYMPHOCYTES # BLD AUTO: 1.79 10*3/MM3 (ref 0.7–3.1)
LYMPHOCYTES NFR BLD AUTO: 27.8 % (ref 19.6–45.3)
MCH RBC QN AUTO: 30.2 PG (ref 26.6–33)
MCHC RBC AUTO-ENTMCNC: 33.9 G/DL (ref 31.5–35.7)
MCV RBC AUTO: 89.1 FL (ref 79–97)
MONOCYTES # BLD AUTO: 0.42 10*3/MM3 (ref 0.1–0.9)
MONOCYTES NFR BLD AUTO: 6.5 % (ref 5–12)
NEUTROPHILS NFR BLD AUTO: 4.05 10*3/MM3 (ref 1.7–7)
NEUTROPHILS NFR BLD AUTO: 63 % (ref 42.7–76)
PLATELET # BLD AUTO: 47 10*3/MM3 (ref 140–450)
PMV BLD AUTO: 10.6 FL (ref 6–12)
POTASSIUM SERPL-SCNC: 4.6 MMOL/L (ref 3.5–5.2)
PROT SERPL-MCNC: 6.6 G/DL (ref 6–8.5)
PSA SERPL-MCNC: 5.03 NG/ML (ref 0–4)
RBC # BLD AUTO: 4.21 10*6/MM3 (ref 4.14–5.8)
SODIUM SERPL-SCNC: 139 MMOL/L (ref 136–145)
WBC NRBC COR # BLD AUTO: 6.43 10*3/MM3 (ref 3.4–10.8)

## 2025-06-13 PROCEDURE — 36415 COLL VENOUS BLD VENIPUNCTURE: CPT

## 2025-06-13 PROCEDURE — 84153 ASSAY OF PSA TOTAL: CPT

## 2025-06-13 PROCEDURE — 85025 COMPLETE CBC W/AUTO DIFF WBC: CPT

## 2025-06-13 PROCEDURE — 80053 COMPREHEN METABOLIC PANEL: CPT

## 2025-06-13 RX ORDER — ZOLPIDEM TARTRATE 10 MG/1
10 TABLET ORAL NIGHTLY PRN
Qty: 90 TABLET | Refills: 0 | Status: SHIPPED | OUTPATIENT
Start: 2025-06-13 | End: 2025-06-13 | Stop reason: SDUPTHER

## 2025-06-13 NOTE — PATIENT INSTRUCTIONS
-Continue aspirin 81 mg daily   -Continue Plavix 75 mg daily   -Continue Lipitor 40 mg nightly   -BP goals 110-140/60-80  -Heart healthy diet and increased activity   -Encourage compliance with cpap

## 2025-06-13 NOTE — TELEPHONE ENCOUNTER
Caller: Huang Almonte    Relationship: Self    Best call back number: [phone]    Which medication are you concerned about: zolpidem (AMBIEN) 10 MG tablet     Who prescribed you this medication: Jeremías Brar MD     What are your concerns: PATIENT IS OUT. INSURANCE COMPANY STATED THEY WILL NOT APPROVE MEDICATION UNTIL THEY GET A FORM STATING WITH THE PATIENT IS NEEDING TO GET THE MEDICATION EVERY MONTH

## 2025-06-13 NOTE — PROGRESS NOTES
Follow Up Office Visit      Encounter Date: 2025   Patient Name: Huang Almonte  : 1960   MRN: [id number]   PCP: Jeremías Brar MD    Chief Complaint:    Chief Complaint   Patient presents with    Follow-up for management of stroke       History of Present Illness: Huang Almonte is a 64 y.o. male who was referred to stroke clinic per Afua CARNEY from general neurology after her work up for numbness of RUE/RLE led to discovery of acute to subacute infarcts within the left MCA territory and subsequently found that patient had a chronic appearing left ICA occlusion. Please see Afua Mccurdy note below from initial work up:     PMH of anxiety, hypercholesterolemia, insomnia, HTN, stage 3a CKD, CVA, prostate cancer  MRI brain from 9/10/2024 showed areas of somewhat intermixed both acute and likely early subacute infarct within the left MCA territory involving the left insula and adjacent pre and postcentral regions.  Minimal localized edema, otherwise no evidence of hemorrhage or significant associated mass effect.  Carotid duplex performed on 10/30/2024  left internal carotid artery is occluded, right internal carotid artery demonstrates a less than 50% stenosis, antegrade right vertebral flow, antegrade left vertebral flow. CTA head and neck showed complete occlusion of left ICA, appearing chronic with evidence of ECA to ICA collateralization and reconstitution of the supraclinoid segment.  No evidence of high-grade intracranial stenosis, large vessel occlusion or aneurysm.  He underwent cardiac transthoracic echocardiogram on 10/30/2024 which showed ejection fraction of 51 to 55%, left ventricular diastolic function is consistent with grade 1 impaired relaxation, saline test results are negative, left atrium showed normal left atrial size and volume.  Lipid panel from 2024 showed total cholesterol 160, triglycerides 224, HDL 41, LDL 82.  He is currently taking atorvastatin 40 mg.  Patient  was also seen by Becky PALACIOS with [facility] 11/6/2022 for for left carotid artery consultation at that time they ordered CT angiogram of head and neck to further assess extent of lesion and if intervention can be performed.  Plan was to follow-up with Dr. Hightower 1 week after to review results and see if patient would be a candidate for intervention. He was recommended to continue DAPT and statin at that time.   Of note patient also follows with Dr. Richard Cowan for chronic idiopathic thrombocytopenia, was diagnosed about 6 to 7 years ago, per review of his office visit notes he suffered CVA in September 2024 and was also diagnosed with prostate cancer around the same time.  He follows with Dr. Linn for prostate cancer with planed intervention in the near future.  He is currently on aspirin and Plavix and tolerating well, platelet count in December 2024 was 42K, should his platelet count drop below 30K will consider pulsed dose steroids, plan was to repeat CBC in 6 months.  Hematology is aware that patient is taking both aspirin and Plavix.        Clinic visit 1/8/2025: Patient denies any new or worsening stroke like symptoms. He has been compliant with ASA and Plavix as well as with his statin. He has been seen by vascular surgery for evaluation of his left carotid occlusion and was not deemed to be an interventional candidate. He will be managed with maximal medical therapy. He will follow up with Dr. Hightower every 6 months with serial carotid U/S. The stroke clinic will take over in his secondary stroke preventative care and appreciate the work done by general neurology to intiate his work up. No changes will be made today to his plan of care. He will be tested for SYLVAIN in March and has been encouraged to wear a CPAP if needed. I have also encouraged him to follow through with the previously ordered Holter monitor. He is seeing cardiology today. No other questions or concerns.      Clinic visit 6/13/2025: Patient denies any new or worsening symptoms. Currently on aspirin and Plavix and statin without issues or side effects. Persistent numbness is reported on the right side of his face, arm, and leg, although it has significantly improved.  He was scheduled for a CyberKnife procedure for prostate cancer, but the anesthesiologist expressed concerns about his recent stroke, carotid occlusion and thrombocytopenia. They would not proceed with the surgery. He was informed that his prostate cancer is non-aggressive.. Radiation or chemotherapy are being considered as alternative treatment options. A mask for severe sleep apnea was recently acquired and used for the first time last night. He recalls having a particularly poor night's sleep during his sleep study.          Subjective        I have reviewed and the following portions of the patient's history were updated as appropriate: past family history, past medical history, past social history, past surgical history and problem list.    Medications:     Current Outpatient Medications:     aspirin 81 MG EC tablet, Take 1 tablet by mouth Daily., Disp: 30 tablet, Rfl: 5    atorvastatin (LIPITOR) 40 MG tablet, Take 1 tablet by mouth Daily., Disp: 90 tablet, Rfl: 1    clopidogrel (Plavix) 75 MG tablet, Take 1 tablet by mouth Daily., Disp: 30 tablet, Rfl: 5    Ferrous Sulfate ER 50 MG tablet controlled-release, Take 1 tablet by mouth Daily., Disp: 90 tablet, Rfl: 1    gabapentin (NEURONTIN) 400 MG capsule, Gabapentin 400 MG Oral Capsule; Patient Sig: Gabapentin 400 MG Oral Capsule ; 90; 0; 07-May-2015; Active (Patient taking differently: Take 600 mg by mouth 3 (Three) Times a Day.), Disp: , Rfl:     lidocaine (LIDODERM) 5 %, , Disp: , Rfl:     lisinopril-hydrochlorothiazide (Zestoretic) 10-12.5 MG per tablet, Take 1 tablet by mouth Daily., Disp: 90 tablet, Rfl: 1    multivitamin with minerals (MULTIVITAMIN ADULT PO), Take 1 tablet by mouth Daily.,  "Disp: , Rfl:     Omega-3 Fatty Acids (Omega-3 Fish Oil) 500 MG capsule, Take 1 capsule by mouth Daily., Disp: , Rfl:     oxyCODONE-acetaminophen (PERCOCET) 7.5-325 MG per tablet, Take 1 tablet by mouth 3 (Three) Times a Day As Needed., Disp: , Rfl:     meclizine (ANTIVERT) 25 MG tablet, 1/2 to 1 pill as needed every 4 hours for dizziness (Patient not taking: Reported on 6/13/2025), Disp: 40 tablet, Rfl: 1    zolpidem (AMBIEN) 10 MG tablet, Take 1 tablet by mouth At Night As Needed for Sleep., Disp: 90 tablet, Rfl: 0    Allergies:   No Known Allergies    Objective     Physical Exam:   Vital Signs:   Vitals:    06/13/25 1243   BP: 118/72   Pulse: 93   Temp: 99.6 °F (37.6 °C)   SpO2: 97%   Weight: 73.9 kg (163 lb)   Height: 170.2 cm (67.01\")     Body mass index is 25.52 kg/m².    Physical Exam  Vitals and nursing note reviewed.   Constitutional:       General: He is awake. He is not in acute distress.     Appearance: Normal appearance. He is normal weight. He is not ill-appearing.   HENT:      Head: Normocephalic and atraumatic.      Nose: Nose normal.      Mouth/Throat:      Mouth: Mucous membranes are moist.   Eyes:      General: Lids are normal.      Extraocular Movements: Extraocular movements intact.      Pupils: Pupils are equal, round, and reactive to light.   Cardiovascular:      Rate and Rhythm: Normal rate and regular rhythm.      Pulses: Normal pulses.   Pulmonary:      Effort: Pulmonary effort is normal. No respiratory distress.   Skin:     General: Skin is warm and dry.   Neurological:      Mental Status: He is alert and oriented to person, place, and time.      Cranial Nerves: No cranial nerve deficit.      Sensory: Sensory deficit present.      Motor: Motor strength is normal.No weakness.      Coordination: Coordination is intact. Coordination normal.      Gait: Gait normal.   Psychiatric:         Mood and Affect: Mood normal.         Speech: Speech normal.         Behavior: Behavior normal. "       Neurological Exam  Mental Status  Awake and alert. Oriented to person, place, time and situation. Oriented to person, place, and time. Speech is normal. Language is fluent with no aphasia. Attention and concentration are normal. Fund of knowledge is appropriate for level of education.    Cranial Nerves  CN II: Right visual acuity: Counts fingers. Left visual acuity: Counts fingers. Visual fields full to confrontation.  CN III, IV, VI: Extraocular movements intact bilaterally. Normal lids and orbits bilaterally. Pupils equal round and reactive to light bilaterally.  CN V:  Right: Diminished sensation of the entire right side of the face.  CN VII: Full and symmetric facial movement.  CN VIII: Hearing is normal to speech .  CN XI: Shoulder shrug strength is normal.  CN XII: Tongue midline without atrophy or fasciculations.    Motor  Normal muscle bulk throughout. No fasciculations present. Normal muscle tone. Strength is 5/5 throughout all four extremities.    Sensory  Light touch abnormality: Diminished right cheek, arm and leg.     Coordination    Finger-to-nose, rapid alternating movements and heel-to-shin normal bilaterally without dysmetria.  No obvious dysmetria .    Gait   Normal gait.Casual gait is normal including stance, stride, and arm swing.       Modified Ary Score: 1        0  No Symptoms    1 No significant disability. Able to carry out all usual activities, despite some symptoms.    2 Slight disability. Able to look after own affairs without assistance, but unable to carry out all previous activities.    3 Moderate disability. Requires some help, but able to walk unassisted.    4 Moderately severe disability. Unable to attend to own bodily needs without assistance, and unable to walk unassisted.    5 Severe disability. Requires constant nursing care and attention, bedridden, incontinent.    6 Dead      PHQ-9 Depression Screening  Little interest or pleasure in doing things? Not at all   Feeling  down, depressed, or hopeless? Not at all   PHQ-2 Total Score 0   Trouble falling or staying asleep, or sleeping too much?     Feeling tired or having little energy?     Poor appetite or overeating?     Feeling bad about yourself - or that you are a failure or have let yourself or your family down?     Trouble concentrating on things, such as reading the newspaper or watching television?     Moving or speaking so slowly that other people could have noticed? Or the opposite - being so fidgety or restless that you have been moving around a lot more than usual?       Thoughts that you would be better off dead, or of hurting yourself in some way?     PHQ-9 Total Score     If you checked off any problems, how difficult have these problems made it for you to do your work, take care of things at home, or get along with other people?             PEEWEE Fall Risk Clinician Key Questions   Have you fallen in the past year?: No  Do you feel unsteady with walking?: Yes      Hemoglobin   Date Value Ref Range Status   06/13/2025 12.7 (L) 13.0 - 17.7 g/dL Final     Hematocrit   Date Value Ref Range Status   06/13/2025 37.5 37.5 - 51.0 % Final     Platelets   Date Value Ref Range Status   06/13/2025 47 (L) 140 - 450 10*3/mm3 Final     Hemoglobin A1C   Date Value Ref Range Status   02/19/2025 5.70 (H) 4.80 - 5.60 % Final     LDL Chol Calc (NIH)   Date Value Ref Range Status   02/05/2025 97 0 - 100 mg/dL Final     AST (SGOT)   Date Value Ref Range Status   06/13/2025 24 1 - 40 U/L Final     ALT (SGPT)   Date Value Ref Range Status   06/13/2025 25 1 - 41 U/L Final          Assessment / Plan      Assessment/Plan:   Diagnoses and all orders for this visit:     # History of stroke  -Continue dual antiplatelet therapy with aspirin 81 mg and Plavix 75 mg daily  -Continue high intensity statin  -Normal blood pressure goals  -Continue to follow-up with vascular surgery, Dr. Hightower, every 6 months for repeat carotid ultrasounds as previously  scheduled.  Patient has been told he is not interventional candidate and will be maximally medically managed.  -Holter monitor was negative for AFib  -Encourage compliance with CPAP  -Encouraged patient to make lifestyle modifications with heart healthy diet and increased activity  -Reviewed signs and symptoms of stroke and when to call 911  -Follow-up in the stroke clinic in approximately 6 months.  If stable at that time can transition to annual visits.     # Essential hypertension  -BP goals, <130/80  -Avoid hypotension     # HLD  -LDL 82, goal less than 70   -continue Lipitor 40 mg nightly        Discussed the importance of medication compliance and lifestyle modifications (adequate blood pressure control, adequate control of hyperlipidemia, adequate glycemic control, increase physical activity, and healthy diet) to help reduce the risk of future cerebrovascular events.  Also discussed the signs symptoms that would warrant the patient return back to the emergency department including unilateral weakness, unilateral numbness, visual disturbances, loss of balance, speech difficulties, and/or a sudden severe headache.    Follow Up:   Return in about 6 months (around 12/13/2025).    Patient or patient representative verbalized consent for the use of Ambient Listening during the visit with  ANGELIKA Carnes for chart documentation. 6/13/2025  12:19 EDT      ANGELIKA Carnes  [facility] [place] [facility]

## 2025-06-13 NOTE — PROGRESS NOTES
Follow Up Office Visit      Date: 2025     Patient Name: Huang Almonte  MRN: [id number]  : 1960  Referring Physician: Jeremías Brar     Chief Complaint:  Follow-up for ITP     History of Present Illness: Huang Almonte is a pleasant 62 y.o. male with past medical history of ITP, hypertension, hyperlipidemia, iron deficiency anemia who presents today for evaluation of ITP. The patient was diagnosed with ITP about 6-7 years ago.  He initially presented to Dr. Duggan in [place] with a platelet count of 100K.  Initial work-up was negative for HIV, B12, folate deficiency and a normal TSH.  Ultrasound of the liver and spleen were normal.  He was started on prednisone with improvement of his platelet count.  Upon steroid taper his platelets dropped to 34 K.  He was then started on prednisone 5 mg every other day with stabilization of his platelets to around 70K- 100K.  Patient was then lost to follow-up due to financial toxicity and insurance issues.  He was managed by his PCP with stable platelet count around 70K-100K until this past year when his platelet counts have slowly trended down to 34K most recently in 2023.  He notes some mild easy bruising but denies any significant petechiae.  Denies any excessive bleeding episodes.  Denies any unexplained fevers, chills, night sweats, weight loss      Interval History:  Presents to clinic for follow-up.  Suffered a CVA in 2024.  Was also diagnosed with prostate cancer at the same time.  Has not had surgical intervention for his prostate cancer as he states anesthesiology felt he was unsafe for surgery.  Denies any new bone pain or discomfort today    Oncology History:    Oncology/Hematology History    No history exists.       Subjective      Review of Systems:   Constitutional: Negative for fevers, chills, or weight loss  Eyes: Negative for blurred vision or discharge         Ear/Nose/Throat: Negative for difficulty swallowing, sore throat,  LAD                                                       Respiratory: Negative for cough, SOA, wheezing                                                                                        Cardiovascular: Negative for chest pain or palpitations                                                                  Gastrointestinal: Negative for nausea, vomiting or diarrhea                                                                     Genitourinary: Negative for dysuria or hematuria                                                                                           Musculoskeletal: Negative for any joint pains or muscle aches                                                                        Neurologic: Negative for any weakness, headaches, dizziness                                                                         Hematologic: Negative for any easy bleeding or bruising                                                                                   Psychiatric: Negative for anxiety or depression                          Past Medical History/Past Surgical History/ Family History/ Social History: Reviewed by me and unchanged from my previous documentation done on December 2024.     Medications:     Current Outpatient Medications:     aspirin 81 MG EC tablet, Take 1 tablet by mouth Daily., Disp: 30 tablet, Rfl: 5    atorvastatin (LIPITOR) 40 MG tablet, Take 1 tablet by mouth Daily., Disp: 90 tablet, Rfl: 1    clopidogrel (Plavix) 75 MG tablet, Take 1 tablet by mouth Daily., Disp: 30 tablet, Rfl: 5    Ferrous Sulfate ER 50 MG tablet controlled-release, Take 1 tablet by mouth Daily., Disp: 90 tablet, Rfl: 1    gabapentin (NEURONTIN) 400 MG capsule, Gabapentin 400 MG Oral Capsule; Patient Sig: Gabapentin 400 MG Oral Capsule ; 90; 0; 07-May-2015; Active (Patient taking differently: Take 600 mg by mouth 3 (Three) Times a Day.), Disp: , Rfl:     lidocaine (LIDODERM) 5 %, , Disp: , Rfl:      "lisinopril-hydrochlorothiazide (Zestoretic) 10-12.5 MG per tablet, Take 1 tablet by mouth Daily., Disp: 90 tablet, Rfl: 1    meclizine (ANTIVERT) 25 MG tablet, 1/2 to 1 pill as needed every 4 hours for dizziness, Disp: 40 tablet, Rfl: 1    multivitamin with minerals (MULTIVITAMIN ADULT PO), Take 1 tablet by mouth Daily., Disp: , Rfl:     Omega-3 Fatty Acids (Omega-3 Fish Oil) 500 MG capsule, Take 1 capsule by mouth Daily., Disp: , Rfl:     oxyCODONE-acetaminophen (PERCOCET) 7.5-325 MG per tablet, , Disp: , Rfl:     zolpidem (AMBIEN) 10 MG tablet, Take 1 tablet by mouth At Night As Needed for Sleep., Disp: 90 tablet, Rfl: 0    Allergies:   No Known Allergies    Objective     Physical Exam:  Vital Signs:   Vitals:    06/13/25 1011   BP: 136/87   Pulse: 78   Resp: 16   Temp: 97.1 °F (36.2 °C)   TempSrc: Temporal   SpO2: 98%   Weight: 74.3 kg (163 lb 11.2 oz)   Height: 170.2 cm (67.01\")   PainSc: 5    PainLoc: Comment: back     Pain Score    06/13/25 1011   PainSc: 5    PainLoc: Comment: back     ECOG Performance Status: 0 - Asymptomatic    Constitutional: NAD, ECOG 0  Eyes: PERRLA, scleral anicteric  ENT: No LAD, no thyromegaly  Respiratory: CTAB, no wheezing, rales, rhonchi  Cardiovascular: RRR, no murmurs, pulses 2+ bilaterally  Abdomen: soft, NT/ND, no HSM  Musculoskeletal: strength 5/5 bilaterally, no c/c/e  Neurologic: A&O x 3, CN II-XII intact grossly    Results Review:   Lab on 06/13/2025   Component Date Value Ref Range Status    WBC 06/13/2025 6.43  3.40 - 10.80 10*3/mm3 Final    RBC 06/13/2025 4.21  4.14 - 5.80 10*6/mm3 Final    Hemoglobin 06/13/2025 12.7 (L)  13.0 - 17.7 g/dL Final    Hematocrit 06/13/2025 37.5  37.5 - 51.0 % Final    MCV 06/13/2025 89.1  79.0 - 97.0 fL Final    MCH 06/13/2025 30.2  26.6 - 33.0 pg Final    MCHC 06/13/2025 33.9  31.5 - 35.7 g/dL Final    RDW 06/13/2025 12.8  12.3 - 15.4 % Final    RDW-SD 06/13/2025 42.3  37.0 - 54.0 fl Final    MPV 06/13/2025 10.6  6.0 - 12.0 fL Final    " Platelets 06/13/2025 47 (L)  140 - 450 10*3/mm3 Final    Neutrophil % 06/13/2025 63.0  42.7 - 76.0 % Final    Lymphocyte % 06/13/2025 27.8  19.6 - 45.3 % Final    Monocyte % 06/13/2025 6.5  5.0 - 12.0 % Final    Eosinophil % 06/13/2025 1.9  0.3 - 6.2 % Final    Basophil % 06/13/2025 0.6  0.0 - 1.5 % Final    Immature Grans % 06/13/2025 0.2  0.0 - 0.5 % Final    Neutrophils, Absolute 06/13/2025 4.05  1.70 - 7.00 10*3/mm3 Final    Lymphocytes, Absolute 06/13/2025 1.79  0.70 - 3.10 10*3/mm3 Final    Monocytes, Absolute 06/13/2025 0.42  0.10 - 0.90 10*3/mm3 Final    Eosinophils, Absolute 06/13/2025 0.12  0.00 - 0.40 10*3/mm3 Final    Basophils, Absolute 06/13/2025 0.04  0.00 - 0.20 10*3/mm3 Final    Immature Grans, Absolute 06/13/2025 0.01  0.00 - 0.05 10*3/mm3 Final       Home Sleep Study  Result Date: 5/29/2025  Narrative: Table formatting from the original result was not included. Home Sleep Apnea Test Report Patient Name:  Huang Almonte Interpreting Physician: Enrico Osorio MD YOB: 1960 Referring Provider: ANGELIKA Kaplan MRN:  [id number] Primary Care Provider: Mari Henry MD Date of Study:  5/21/2025   Clinical Information 64 y.o.male with signs and/or symptoms suggestive of obstructive sleep apnea.  History reviewed.  Please see referral clinic note for further details.  A home sleep apnea test was ordered to further evaluate for the presence of sleep apnea. Methods used for Home Sleep Testing:  Patient had a home sleep test with an Poetica Night One device that measured airflow at the nose and mouth.  It measured thoracic respiratory effort using respiratory inductance plethysmography.  It measured oxygen saturation and determined pulse rate.  Body position was recorded.  Snoring was judged by transducer vibration.  It was scored using standard techniques.  Home Sleep Testing Results The study was technically adequate.  Study time was adequate.  The AHI was 44.9.  Oxygen  saturations averaged 93%.  Oxygen saturations were less than or equal to 88% for a total of 49.6 minutes throughout the night.  The events were primarily obstructive in nature.  Central events: 0% of total events.  A significant supine positional effect was not noted.  Snoring was present. Impression:  -  severe obstructive sleep apnea is present. -  Oxygen desaturations are present.  -  Snoring is present. Recommendations: - The patient would likely benefit from a trial of PAP therapy such as an AutoSet CPAP with an 8 cm minimum and 18 cm maximum. - Recommend the patient avoid alcohol and sedatives close to bedtime. Follow-up:  [facility] Electronically signed by:  Enrico Osorio MD 05/29/25 17:08 EDT     XR Spine Lumbar 2 or 3 View  Result Date: 5/23/2025  Narrative: XR SPINE LUMBAR 2 OR 3 VW Date of Exam: 5/21/2025 10:03 AM EDT Indication: [id number] Comparison: None available. Findings: There are 5 typical lumbar spine vertebral bodies in anatomic alignment.  No evidence of fracture or compression deformity. There is multilevel mild to moderate disc space narrowing and endplate osteophytosis. There is multilevel lumbar facet joint arthropathy. The soft tissues appear within normal limits.  No focal lesions identified.    Atheromatous calcifications of the abdominal aorta.     Impression: Impression: Multilevel lumbar spondylosis and facet arthropathy. No acute findings. Electronically Signed: Debbie Nicoel MD  5/23/2025 11:56 AM EDT  Workstation ID: [id number]      Assessment / Plan      Assessment/Plan:   1. Chronic ITP (idiopathic thrombocytopenia) (HCC) (Primary)  -Diagnosed about 6-7 years ago and was previously followed by Dr. Duggan in [place]  -Has previously been steroid responsive but is not been on any treatment over the past several years  -Most recent platelet count 34K in January 2023  -Platelet count 35K in June 2023  -Platelet count 44K in December 2023  -Platelet count 39K in June  2024  -Platelet count 42K in December 2024  -Platelet count 47K in June 2025  -No indication for treatment at this time.  Should his platelet count drop below 30K, will consider pulse dose steroids  -Plan to repeat a CBC in 6 months.  Ordered today     2.  Iron deficiency anemia  -Iron studies stable on oral iron     3. Prostate cancer  -Initially presenting with a PSA of 6.8 in September 2024  -Biopsy at that time consistent with an adenocarcinoma with a [facility] score 3+3 equal 6 in multiple cores  -May or may not be a candidate for surgical intervention due to his medical comorbidities  -Repeat PSA 5.8 in February 2025  -Following with Dr. Campbell with plans for intervention in the near future  -If he is deemed not a surgical candidate, would refer to radiation oncology for discussion of [employer]     4. Cerebrovascular accident (CVA) due to occlusion of left middle cerebral artery  -Secondary to total left occluded cerebral artery  -Currently on aspirin and Plavix         Follow Up:   Follow-up in 6 months or sooner if needed     Garrick Cowan MD  Hematology and Oncology     Please note that portions of this note may have been completed with a voice recognition program. Efforts were made to edit the dictations, but occasionally words are mistranscribed.

## 2025-06-16 RX ORDER — ZOLPIDEM TARTRATE 10 MG/1
10 TABLET ORAL NIGHTLY PRN
Qty: 30 TABLET | Refills: 2 | Status: SHIPPED | OUTPATIENT
Start: 2025-06-16

## 2025-06-24 ENCOUNTER — TELEPHONE (OUTPATIENT)
Dept: FAMILY MEDICINE CLINIC | Facility: CLINIC | Age: 65
End: 2025-06-24
Payer: MEDICARE

## 2025-06-24 NOTE — TELEPHONE ENCOUNTER
Caller: Huang Almonte    Relationship: Self    Best call back number: [phone]     Which medication are you concerned about: zolpidem (AMBIEN) 10 MG tablet     What are your concerns: PATIENT STATES HE HAS BEEN HAVING SOME ISSUES WITH HIS INSURANCE COVERING THIS MEDICATION AND THEY ARE NEEDING ANOTHER PRIOR AUTHORIZATION.

## 2025-07-28 ENCOUNTER — TRANSCRIBE ORDERS (OUTPATIENT)
Dept: ADMINISTRATIVE | Facility: HOSPITAL | Age: 65
End: 2025-07-28
Payer: MEDICARE

## 2025-07-28 DIAGNOSIS — M51.369 OTHER INTERVERTEBRAL DISC DEGENERATION OF LUMBAR REGION WITHOUT LUMBAR BACK PAIN OR LOWER EXTREMITY PAIN: Primary | ICD-10-CM

## 2025-08-05 ENCOUNTER — OFFICE VISIT (OUTPATIENT)
Dept: FAMILY MEDICINE CLINIC | Facility: CLINIC | Age: 65
End: 2025-08-05
Payer: MEDICARE

## 2025-08-05 VITALS
DIASTOLIC BLOOD PRESSURE: 68 MMHG | TEMPERATURE: 97.8 F | OXYGEN SATURATION: 98 % | RESPIRATION RATE: 18 BRPM | WEIGHT: 164 LBS | BODY MASS INDEX: 25.74 KG/M2 | SYSTOLIC BLOOD PRESSURE: 124 MMHG | HEART RATE: 67 BPM | HEIGHT: 67 IN

## 2025-08-05 DIAGNOSIS — N18.31 STAGE 3A CHRONIC KIDNEY DISEASE: ICD-10-CM

## 2025-08-05 DIAGNOSIS — Z87.891 HISTORY OF TOBACCO USE: ICD-10-CM

## 2025-08-05 DIAGNOSIS — D50.9 IRON DEFICIENCY ANEMIA, UNSPECIFIED IRON DEFICIENCY ANEMIA TYPE: ICD-10-CM

## 2025-08-05 DIAGNOSIS — G47.33 OSA ON CPAP: ICD-10-CM

## 2025-08-05 DIAGNOSIS — E78.00 HYPERCHOLESTEREMIA: ICD-10-CM

## 2025-08-05 DIAGNOSIS — I63.512 CEREBROVASCULAR ACCIDENT (CVA) DUE TO OCCLUSION OF LEFT MIDDLE CEREBRAL ARTERY: ICD-10-CM

## 2025-08-05 DIAGNOSIS — F17.200 CURRENT EVERY DAY SMOKER: ICD-10-CM

## 2025-08-05 DIAGNOSIS — I10 ESSENTIAL HYPERTENSION: Primary | ICD-10-CM

## 2025-08-05 PROCEDURE — 3078F DIAST BP <80 MM HG: CPT | Performed by: FAMILY MEDICINE

## 2025-08-05 PROCEDURE — 1160F RVW MEDS BY RX/DR IN RCRD: CPT | Performed by: FAMILY MEDICINE

## 2025-08-05 PROCEDURE — 1125F AMNT PAIN NOTED PAIN PRSNT: CPT | Performed by: FAMILY MEDICINE

## 2025-08-05 PROCEDURE — 3074F SYST BP LT 130 MM HG: CPT | Performed by: FAMILY MEDICINE

## 2025-08-05 PROCEDURE — 99214 OFFICE O/P EST MOD 30 MIN: CPT | Performed by: FAMILY MEDICINE

## 2025-08-05 PROCEDURE — G0296 VISIT TO DETERM LDCT ELIG: HCPCS | Performed by: FAMILY MEDICINE

## 2025-08-05 PROCEDURE — G2211 COMPLEX E/M VISIT ADD ON: HCPCS | Performed by: FAMILY MEDICINE

## 2025-08-05 PROCEDURE — 1159F MED LIST DOCD IN RCRD: CPT | Performed by: FAMILY MEDICINE

## 2025-08-05 RX ORDER — LISINOPRIL AND HYDROCHLOROTHIAZIDE 10; 12.5 MG/1; MG/1
1 TABLET ORAL DAILY
Qty: 90 TABLET | Refills: 1 | Status: SHIPPED | OUTPATIENT
Start: 2025-08-05

## 2025-08-05 RX ORDER — ATORVASTATIN CALCIUM 40 MG/1
40 TABLET, FILM COATED ORAL DAILY
Qty: 90 TABLET | Refills: 1 | Status: SHIPPED | OUTPATIENT
Start: 2025-08-05

## 2025-08-05 RX ORDER — CLOPIDOGREL BISULFATE 75 MG/1
75 TABLET ORAL DAILY
Qty: 30 TABLET | Refills: 5 | Status: SHIPPED | OUTPATIENT
Start: 2025-08-05

## 2025-08-06 LAB
ALBUMIN SERPL-MCNC: 4.5 G/DL (ref 3.9–4.9)
ALP SERPL-CCNC: 91 IU/L (ref 44–121)
ALT SERPL-CCNC: 16 IU/L (ref 0–44)
AST SERPL-CCNC: 18 IU/L (ref 0–40)
BASOPHILS # BLD AUTO: 0.1 X10E3/UL (ref 0–0.2)
BASOPHILS NFR BLD AUTO: 1 %
BILIRUB SERPL-MCNC: 0.3 MG/DL (ref 0–1.2)
BUN SERPL-MCNC: 25 MG/DL (ref 8–27)
BUN/CREAT SERPL: 17 (ref 10–24)
CALCIUM SERPL-MCNC: 9.6 MG/DL (ref 8.6–10.2)
CHLORIDE SERPL-SCNC: 101 MMOL/L (ref 96–106)
CHOLEST SERPL-MCNC: 158 MG/DL (ref 100–199)
CO2 SERPL-SCNC: 22 MMOL/L (ref 20–29)
CREAT SERPL-MCNC: 1.46 MG/DL (ref 0.76–1.27)
EGFRCR SERPLBLD CKD-EPI 2021: 53 ML/MIN/1.73
EOSINOPHIL # BLD AUTO: 0.1 X10E3/UL (ref 0–0.4)
EOSINOPHIL NFR BLD AUTO: 1 %
ERYTHROCYTE [DISTWIDTH] IN BLOOD BY AUTOMATED COUNT: 12.9 % (ref 11.6–15.4)
GLOBULIN SER CALC-MCNC: 2.5 G/DL (ref 1.5–4.5)
GLUCOSE SERPL-MCNC: 103 MG/DL (ref 70–99)
HCT VFR BLD AUTO: 39.5 % (ref 37.5–51)
HDLC SERPL-MCNC: 45 MG/DL
HGB BLD-MCNC: 12.8 G/DL (ref 13–17.7)
IMM GRANULOCYTES # BLD AUTO: 0 X10E3/UL (ref 0–0.1)
IMM GRANULOCYTES NFR BLD AUTO: 0 %
IRON SATN MFR SERPL: 35 % (ref 15–55)
IRON SERPL-MCNC: 104 UG/DL (ref 38–169)
LDLC SERPL CALC-MCNC: 88 MG/DL (ref 0–99)
LYMPHOCYTES # BLD AUTO: 1.8 X10E3/UL (ref 0.7–3.1)
LYMPHOCYTES NFR BLD AUTO: 26 %
MCH RBC QN AUTO: 30.8 PG (ref 26.6–33)
MCHC RBC AUTO-ENTMCNC: 32.4 G/DL (ref 31.5–35.7)
MCV RBC AUTO: 95 FL (ref 79–97)
MONOCYTES # BLD AUTO: 0.4 X10E3/UL (ref 0.1–0.9)
MONOCYTES NFR BLD AUTO: 6 %
MORPHOLOGY BLD-IMP: ABNORMAL
NEUTROPHILS # BLD AUTO: 4.6 X10E3/UL (ref 1.4–7)
NEUTROPHILS NFR BLD AUTO: 66 %
PLATELET # BLD AUTO: 52 X10E3/UL (ref 150–450)
POTASSIUM SERPL-SCNC: 4.4 MMOL/L (ref 3.5–5.2)
PROT SERPL-MCNC: 7 G/DL (ref 6–8.5)
RBC # BLD AUTO: 4.15 X10E6/UL (ref 4.14–5.8)
SODIUM SERPL-SCNC: 139 MMOL/L (ref 134–144)
TIBC SERPL-MCNC: 301 UG/DL (ref 250–450)
TRIGL SERPL-MCNC: 143 MG/DL (ref 0–149)
UIBC SERPL-MCNC: 197 UG/DL (ref 111–343)
URATE SERPL-MCNC: 7.5 MG/DL (ref 3.8–8.4)
VLDLC SERPL CALC-MCNC: 25 MG/DL (ref 5–40)
WBC # BLD AUTO: 6.9 X10E3/UL (ref 3.4–10.8)

## 2025-08-14 ENCOUNTER — OFFICE VISIT (OUTPATIENT)
Dept: SLEEP MEDICINE | Age: 65
End: 2025-08-14
Payer: MEDICARE

## 2025-08-14 VITALS
WEIGHT: 165.7 LBS | HEIGHT: 67 IN | OXYGEN SATURATION: 98 % | DIASTOLIC BLOOD PRESSURE: 70 MMHG | HEART RATE: 90 BPM | SYSTOLIC BLOOD PRESSURE: 130 MMHG | TEMPERATURE: 96.6 F | BODY MASS INDEX: 26.01 KG/M2

## 2025-08-14 DIAGNOSIS — G47.33 OBSTRUCTIVE SLEEP APNEA, ADULT: Primary | ICD-10-CM

## 2025-08-14 DIAGNOSIS — E66.3 OVERWEIGHT WITH BODY MASS INDEX (BMI) 25.0-29.9: ICD-10-CM

## 2025-08-18 ENCOUNTER — HOSPITAL ENCOUNTER (OUTPATIENT)
Dept: MRI IMAGING | Facility: HOSPITAL | Age: 65
Discharge: HOME | End: 2025-08-18
Admitting: FAMILY MEDICINE
Payer: MEDICARE

## 2025-08-18 DIAGNOSIS — M51.369 OTHER INTERVERTEBRAL DISC DEGENERATION OF LUMBAR REGION WITHOUT LUMBAR BACK PAIN OR LOWER EXTREMITY PAIN: ICD-10-CM

## 2025-08-18 PROCEDURE — 72148 MRI LUMBAR SPINE W/O DYE: CPT
